# Patient Record
Sex: MALE | Race: WHITE | NOT HISPANIC OR LATINO | URBAN - METROPOLITAN AREA
[De-identification: names, ages, dates, MRNs, and addresses within clinical notes are randomized per-mention and may not be internally consistent; named-entity substitution may affect disease eponyms.]

---

## 2021-01-13 ENCOUNTER — EMERGENCY (EMERGENCY)
Facility: HOSPITAL | Age: 76
LOS: 1 days | Discharge: HOME | End: 2021-01-13
Admitting: EMERGENCY MEDICINE
Payer: MEDICARE

## 2021-01-13 VITALS
TEMPERATURE: 98 F | RESPIRATION RATE: 18 BRPM | OXYGEN SATURATION: 95 % | SYSTOLIC BLOOD PRESSURE: 143 MMHG | DIASTOLIC BLOOD PRESSURE: 74 MMHG | HEART RATE: 71 BPM

## 2021-01-13 DIAGNOSIS — Z20.822 CONTACT WITH AND (SUSPECTED) EXPOSURE TO COVID-19: ICD-10-CM

## 2021-01-13 LAB — SARS-COV-2 RNA SPEC QL NAA+PROBE: SIGNIFICANT CHANGE UP

## 2021-01-13 PROCEDURE — U0003: CPT

## 2021-01-13 PROCEDURE — 99283 EMERGENCY DEPT VISIT LOW MDM: CPT

## 2021-01-13 PROCEDURE — U0005: CPT

## 2021-01-13 NOTE — ED PROVIDER NOTE - PATIENT PORTAL LINK FT
You can access the FollowMyHealth Patient Portal offered by [facility] by registering at the following website: [email or web address]. By joining [employer]’s FollowMyHealth portal, you will also be able to view your health information using other applications (apps) compatible with our system.

## 2021-01-21 ENCOUNTER — EMERGENCY (EMERGENCY)
Facility: HOSPITAL | Age: 76
LOS: 1 days | Discharge: HOME | End: 2021-01-21
Admitting: EMERGENCY MEDICINE
Payer: MEDICARE

## 2021-01-21 VITALS
RESPIRATION RATE: 18 BRPM | SYSTOLIC BLOOD PRESSURE: 153 MMHG | HEART RATE: 65 BPM | OXYGEN SATURATION: 98 % | DIASTOLIC BLOOD PRESSURE: 109 MMHG | TEMPERATURE: 97 F

## 2021-01-21 DIAGNOSIS — Z20.822 CONTACT WITH AND (SUSPECTED) EXPOSURE TO COVID-19: ICD-10-CM

## 2021-01-21 LAB — SARS-COV-2 RNA SPEC QL NAA+PROBE: SIGNIFICANT CHANGE UP

## 2021-01-21 PROCEDURE — U0003: CPT

## 2021-01-21 PROCEDURE — U0005: CPT

## 2021-01-21 PROCEDURE — 99283 EMERGENCY DEPT VISIT LOW MDM: CPT

## 2021-01-21 NOTE — ED PROVIDER NOTE - CLINICAL SUMMARY MEDICAL DECISION MAKING FREE TEXT BOX
Patient is presenting to the Emergency Department and requesting a COVID-19 test.  Patient has minimal symptoms, has an unremarkable focused exam and looks well.  Nasopharyngeal PCR has been obtained and patient has been guided on how to obtain their results.  General COVID-19 discharge instructions have been given to the patient. Patient is presenting to the Emergency Department and requesting a COVID-19 test.  Patient has minimal symptoms, has an unremarkable focused exam and looks well.  Nasopharyngeal PCR has been obtained and patient has been guided on how to obtain their results.  General COVID-19 discharge instructions have been given to the patient.    Pts BP noted to be elevated. Pt asymptomatic. Pt made aware and advised to f/u with pmd

## 2021-01-21 NOTE — ED PROVIDER NOTE - NS ED ROS FT
CONSTITUTIONAL: +subj fever, no chills, no bodyaches  HEENT:  No sore throat or headache, no nasal congestion  PULM:  No cough or shortness of breath  GI:  No diarrhea, no vomiting

## 2021-01-21 NOTE — ED PROVIDER NOTE - OBJECTIVE STATEMENT
Patient is presenting to the Emergency Department with a request to have COVID-19 testing.  Pt reports malaise and subjective fever. Denies symptoms, including cough, shortness of breath, chills, bodyaches or sore throat.

## 2021-05-11 NOTE — ED PROVIDER NOTE - NS ED MD DISPO DISCHARGE CCDA
Clear bilaterally, pupils equal, round and reactive to light.
Patient/Caregiver provided printed discharge information.

## 2022-01-21 ENCOUNTER — APPOINTMENT (OUTPATIENT)
Dept: OTOLARYNGOLOGY | Facility: CLINIC | Age: 77
End: 2022-01-21
Payer: MEDICARE

## 2022-01-21 VITALS
HEART RATE: 74 BPM | HEIGHT: 69 IN | SYSTOLIC BLOOD PRESSURE: 143 MMHG | WEIGHT: 180 LBS | DIASTOLIC BLOOD PRESSURE: 89 MMHG | TEMPERATURE: 97.4 F | BODY MASS INDEX: 26.66 KG/M2

## 2022-01-21 PROCEDURE — 99204 OFFICE O/P NEW MOD 45 MIN: CPT | Mod: 25

## 2022-01-21 PROCEDURE — 31575 DIAGNOSTIC LARYNGOSCOPY: CPT

## 2022-01-21 RX ORDER — ASPIRIN 81 MG
TABLET,CHEWABLE ORAL
Refills: 0 | Status: ACTIVE | COMMUNITY

## 2022-01-21 NOTE — HISTORY OF PRESENT ILLNESS
[de-identified] : 76y M presents for hoarseness that has been progressing over years. Pt has worsening symptoms with tension. Nexium improves the voice. Pt has not been able to sing and it takes great effort when he tries. Pt has some trouble when he speaks a lot.  [Difficulty Swallowing] : no difficulty swallowing [Cough] : no cough [Hoarseness] : hoarseness [Shortness of Breath] : no shortness of breath

## 2022-01-21 NOTE — PROCEDURE
[Flexible Endoscope] : examined with the flexible endoscope [True Vocal Cords Erythematous] : bilateral true vocal cord edema [Glottis Arytenoid Cartilages Erythema] : bilateral arytenoid ~M erythema [Arytenoid Erythema ___ /4] : arytenoid erythema [unfilled]U/4 [Normal] : posterior cricoid area had healthy pink mucosa in the interarytenoid area and the esophageal inlet [Interarytenoid Edema] : interarytenoid area edematous

## 2022-02-03 ENCOUNTER — APPOINTMENT (OUTPATIENT)
Dept: OTOLARYNGOLOGY | Facility: CLINIC | Age: 77
End: 2022-02-03

## 2022-02-08 ENCOUNTER — APPOINTMENT (OUTPATIENT)
Dept: OTOLARYNGOLOGY | Facility: CLINIC | Age: 77
End: 2022-02-08
Payer: MEDICARE

## 2022-02-08 ENCOUNTER — APPOINTMENT (OUTPATIENT)
Dept: PHARMACY | Facility: CLINIC | Age: 77
End: 2022-02-08
Payer: SELF-PAY

## 2022-02-08 PROCEDURE — 92557 COMPREHENSIVE HEARING TEST: CPT

## 2022-02-08 PROCEDURE — 92550 TYMPANOMETRY & REFLEX THRESH: CPT

## 2022-02-08 PROCEDURE — V5090: CPT

## 2022-02-08 PROCEDURE — V5010 ASSESSMENT FOR HEARING AID: CPT

## 2022-02-17 ENCOUNTER — APPOINTMENT (OUTPATIENT)
Dept: OTOLARYNGOLOGY | Facility: CLINIC | Age: 77
End: 2022-02-17
Payer: MEDICARE

## 2022-02-17 ENCOUNTER — NON-APPOINTMENT (OUTPATIENT)
Age: 77
End: 2022-02-17

## 2022-02-17 PROCEDURE — 92524 BEHAVRAL QUALIT ANALYS VOICE: CPT | Mod: GN

## 2022-02-17 PROCEDURE — 31579 LARYNGOSCOPY TELESCOPIC: CPT

## 2022-03-03 ENCOUNTER — APPOINTMENT (OUTPATIENT)
Dept: OTOLARYNGOLOGY | Facility: CLINIC | Age: 77
End: 2022-03-03

## 2022-03-08 ENCOUNTER — APPOINTMENT (OUTPATIENT)
Dept: PHARMACY | Facility: CLINIC | Age: 77
End: 2022-03-08
Payer: SELF-PAY

## 2022-03-08 PROCEDURE — V5299A: CUSTOM | Mod: NC

## 2022-03-18 ENCOUNTER — APPOINTMENT (OUTPATIENT)
Dept: OTOLARYNGOLOGY | Facility: CLINIC | Age: 77
End: 2022-03-18
Payer: MEDICARE

## 2022-03-18 VITALS
HEART RATE: 69 BPM | WEIGHT: 180 LBS | TEMPERATURE: 97.2 F | SYSTOLIC BLOOD PRESSURE: 129 MMHG | BODY MASS INDEX: 26.66 KG/M2 | HEIGHT: 69 IN | DIASTOLIC BLOOD PRESSURE: 87 MMHG

## 2022-03-18 DIAGNOSIS — R49.0 DYSPHONIA: ICD-10-CM

## 2022-03-18 DIAGNOSIS — K21.9 GASTRO-ESOPHAGEAL REFLUX DISEASE W/OUT ESOPHAGITIS: ICD-10-CM

## 2022-03-18 DIAGNOSIS — H90.5 UNSPECIFIED SENSORINEURAL HEARING LOSS: ICD-10-CM

## 2022-03-18 PROCEDURE — 31575 DIAGNOSTIC LARYNGOSCOPY: CPT

## 2022-03-18 PROCEDURE — 99214 OFFICE O/P EST MOD 30 MIN: CPT | Mod: 25

## 2022-03-18 RX ORDER — PANTOPRAZOLE 40 MG/1
40 TABLET, DELAYED RELEASE ORAL
Qty: 30 | Refills: 3 | Status: ACTIVE | COMMUNITY
Start: 2022-03-18 | End: 1900-01-01

## 2022-03-18 NOTE — HISTORY OF PRESENT ILLNESS
[FreeTextEntry1] : Pt has some improvement with medication and therapy. Pt still has decreased voice quality and would like better. Pt went for  and showed SNHL and is getting hearing aids.

## 2022-04-01 ENCOUNTER — APPOINTMENT (OUTPATIENT)
Dept: OTOLARYNGOLOGY | Facility: CLINIC | Age: 77
End: 2022-04-01
Payer: MEDICARE

## 2022-04-01 PROCEDURE — 92524 BEHAVRAL QUALIT ANALYS VOICE: CPT | Mod: GN

## 2022-04-01 PROCEDURE — 31579 LARYNGOSCOPY TELESCOPIC: CPT

## 2022-04-08 ENCOUNTER — APPOINTMENT (OUTPATIENT)
Dept: OTOLARYNGOLOGY | Facility: CLINIC | Age: 77
End: 2022-04-08
Payer: MEDICARE

## 2022-04-08 PROCEDURE — 92507 TX SP LANG VOICE COMM INDIV: CPT | Mod: GN

## 2022-04-25 ENCOUNTER — APPOINTMENT (OUTPATIENT)
Dept: OTOLARYNGOLOGY | Facility: CLINIC | Age: 77
End: 2022-04-25

## 2022-05-25 ENCOUNTER — RX RENEWAL (OUTPATIENT)
Age: 77
End: 2022-05-25

## 2022-07-08 RX ORDER — NIRMATRELVIR AND RITONAVIR 150-100 MG
3 KIT ORAL
Qty: 0 | Refills: 0 | DISCHARGE
Start: 2022-07-08 | End: 2022-07-11

## 2022-07-12 ENCOUNTER — INPATIENT (INPATIENT)
Facility: HOSPITAL | Age: 77
LOS: 10 days | Discharge: NURSING, REHAB OR LONG-TERM CARE | DRG: 23 | End: 2022-07-23
Attending: PSYCHIATRY & NEUROLOGY | Admitting: RADIOLOGY
Payer: MEDICARE

## 2022-07-12 ENCOUNTER — TRANSCRIPTION ENCOUNTER (OUTPATIENT)
Age: 77
End: 2022-07-12

## 2022-07-12 VITALS
HEART RATE: 80 BPM | HEIGHT: 68 IN | SYSTOLIC BLOOD PRESSURE: 179 MMHG | OXYGEN SATURATION: 97 % | RESPIRATION RATE: 16 BRPM | DIASTOLIC BLOOD PRESSURE: 100 MMHG | WEIGHT: 170.2 LBS

## 2022-07-12 DIAGNOSIS — Z95.5 PRESENCE OF CORONARY ANGIOPLASTY IMPLANT AND GRAFT: Chronic | ICD-10-CM

## 2022-07-12 DIAGNOSIS — E78.5 HYPERLIPIDEMIA, UNSPECIFIED: ICD-10-CM

## 2022-07-12 DIAGNOSIS — I63.9 CEREBRAL INFARCTION, UNSPECIFIED: ICD-10-CM

## 2022-07-12 DIAGNOSIS — I10 ESSENTIAL (PRIMARY) HYPERTENSION: ICD-10-CM

## 2022-07-12 DIAGNOSIS — Z98.890 OTHER SPECIFIED POSTPROCEDURAL STATES: Chronic | ICD-10-CM

## 2022-07-12 DIAGNOSIS — I48.91 UNSPECIFIED ATRIAL FIBRILLATION: ICD-10-CM

## 2022-07-12 DIAGNOSIS — Z95.9 PRESENCE OF CARDIAC AND VASCULAR IMPLANT AND GRAFT, UNSPECIFIED: Chronic | ICD-10-CM

## 2022-07-12 LAB
ALBUMIN SERPL ELPH-MCNC: 3.5 G/DL — SIGNIFICANT CHANGE UP (ref 3.3–5)
ALBUMIN SERPL ELPH-MCNC: 4.1 G/DL — SIGNIFICANT CHANGE UP (ref 3.3–5)
ALP SERPL-CCNC: 67 U/L — SIGNIFICANT CHANGE UP (ref 40–120)
ALP SERPL-CCNC: 89 U/L — SIGNIFICANT CHANGE UP (ref 40–120)
ALT FLD-CCNC: 22 U/L — SIGNIFICANT CHANGE UP (ref 10–45)
ALT FLD-CCNC: 31 U/L — SIGNIFICANT CHANGE UP (ref 10–45)
ANION GAP SERPL CALC-SCNC: 10 MMOL/L — SIGNIFICANT CHANGE UP (ref 5–17)
ANION GAP SERPL CALC-SCNC: 11 MMOL/L — SIGNIFICANT CHANGE UP (ref 5–17)
APTT BLD: 26.1 SEC — LOW (ref 27.5–35.5)
APTT BLD: 28.5 SEC — SIGNIFICANT CHANGE UP (ref 27.5–35.5)
AST SERPL-CCNC: 28 U/L — SIGNIFICANT CHANGE UP (ref 10–40)
AST SERPL-CCNC: 40 U/L — SIGNIFICANT CHANGE UP (ref 10–40)
BASE EXCESS BLDCOV CALC-SCNC: -3.3 MMOL/L — SIGNIFICANT CHANGE UP (ref -9.3–0.3)
BASOPHILS # BLD AUTO: 0.03 K/UL — SIGNIFICANT CHANGE UP (ref 0–0.2)
BASOPHILS NFR BLD AUTO: 0.5 % — SIGNIFICANT CHANGE UP (ref 0–2)
BILIRUB DIRECT SERPL-MCNC: 0.2 MG/DL — SIGNIFICANT CHANGE UP (ref 0–0.3)
BILIRUB INDIRECT FLD-MCNC: 0.2 MG/DL — SIGNIFICANT CHANGE UP (ref 0.2–1)
BILIRUB SERPL-MCNC: 0.3 MG/DL — SIGNIFICANT CHANGE UP (ref 0.2–1.2)
BILIRUB SERPL-MCNC: 0.4 MG/DL — SIGNIFICANT CHANGE UP (ref 0.2–1.2)
BLD GP AB SCN SERPL QL: NEGATIVE — SIGNIFICANT CHANGE UP
BUN SERPL-MCNC: 30 MG/DL — HIGH (ref 7–23)
BUN SERPL-MCNC: 37 MG/DL — HIGH (ref 7–23)
CALCIUM SERPL-MCNC: 8.4 MG/DL — SIGNIFICANT CHANGE UP (ref 8.4–10.5)
CALCIUM SERPL-MCNC: 9.7 MG/DL — SIGNIFICANT CHANGE UP (ref 8.4–10.5)
CHLORIDE SERPL-SCNC: 104 MMOL/L — SIGNIFICANT CHANGE UP (ref 96–108)
CHLORIDE SERPL-SCNC: 111 MMOL/L — HIGH (ref 96–108)
CO2 BLDCOV-SCNC: 23 MMOL/L — SIGNIFICANT CHANGE UP
CO2 SERPL-SCNC: 20 MMOL/L — LOW (ref 22–31)
CO2 SERPL-SCNC: 27 MMOL/L — SIGNIFICANT CHANGE UP (ref 22–31)
CREAT SERPL-MCNC: 1.06 MG/DL — SIGNIFICANT CHANGE UP (ref 0.5–1.3)
CREAT SERPL-MCNC: 1.36 MG/DL — HIGH (ref 0.5–1.3)
EGFR: 54 ML/MIN/1.73M2 — LOW
EGFR: 72 ML/MIN/1.73M2 — SIGNIFICANT CHANGE UP
EOSINOPHIL # BLD AUTO: 0.25 K/UL — SIGNIFICANT CHANGE UP (ref 0–0.5)
EOSINOPHIL NFR BLD AUTO: 3.8 % — SIGNIFICANT CHANGE UP (ref 0–6)
GAS PNL BLDCOV: 7.35 — SIGNIFICANT CHANGE UP (ref 7.25–7.45)
GLUCOSE BLDC GLUCOMTR-MCNC: 116 MG/DL — HIGH (ref 70–99)
GLUCOSE BLDC GLUCOMTR-MCNC: 120 MG/DL — HIGH (ref 70–99)
GLUCOSE BLDC GLUCOMTR-MCNC: 129 MG/DL — HIGH (ref 70–99)
GLUCOSE SERPL-MCNC: 105 MG/DL — HIGH (ref 70–99)
GLUCOSE SERPL-MCNC: 127 MG/DL — HIGH (ref 70–99)
HCO3 BLDCOV-SCNC: 22 MMOL/L — SIGNIFICANT CHANGE UP
HCT VFR BLD CALC: 37.9 % — LOW (ref 39–50)
HCT VFR BLD CALC: 46.6 % — SIGNIFICANT CHANGE UP (ref 39–50)
HGB BLD-MCNC: 13 G/DL — SIGNIFICANT CHANGE UP (ref 13–17)
HGB BLD-MCNC: 15.9 G/DL — SIGNIFICANT CHANGE UP (ref 13–17)
IMM GRANULOCYTES NFR BLD AUTO: 0.5 % — SIGNIFICANT CHANGE UP (ref 0–1.5)
INR BLD: 0.92 — SIGNIFICANT CHANGE UP (ref 0.88–1.16)
INR BLD: 1.06 — SIGNIFICANT CHANGE UP (ref 0.88–1.16)
LACTATE SERPL-SCNC: 1.4 MMOL/L — SIGNIFICANT CHANGE UP (ref 0.5–2)
LYMPHOCYTES # BLD AUTO: 2.16 K/UL — SIGNIFICANT CHANGE UP (ref 1–3.3)
LYMPHOCYTES # BLD AUTO: 33.1 % — SIGNIFICANT CHANGE UP (ref 13–44)
MAGNESIUM SERPL-MCNC: 1.7 MG/DL — SIGNIFICANT CHANGE UP (ref 1.6–2.6)
MCHC RBC-ENTMCNC: 31.5 PG — SIGNIFICANT CHANGE UP (ref 27–34)
MCHC RBC-ENTMCNC: 31.7 PG — SIGNIFICANT CHANGE UP (ref 27–34)
MCHC RBC-ENTMCNC: 34.1 GM/DL — SIGNIFICANT CHANGE UP (ref 32–36)
MCHC RBC-ENTMCNC: 34.3 GM/DL — SIGNIFICANT CHANGE UP (ref 32–36)
MCV RBC AUTO: 92.3 FL — SIGNIFICANT CHANGE UP (ref 80–100)
MCV RBC AUTO: 92.4 FL — SIGNIFICANT CHANGE UP (ref 80–100)
MONOCYTES # BLD AUTO: 0.82 K/UL — SIGNIFICANT CHANGE UP (ref 0–0.9)
MONOCYTES NFR BLD AUTO: 12.6 % — SIGNIFICANT CHANGE UP (ref 2–14)
NEUTROPHILS # BLD AUTO: 3.23 K/UL — SIGNIFICANT CHANGE UP (ref 1.8–7.4)
NEUTROPHILS NFR BLD AUTO: 49.5 % — SIGNIFICANT CHANGE UP (ref 43–77)
NRBC # BLD: 0 /100 WBCS — SIGNIFICANT CHANGE UP (ref 0–0)
NRBC # BLD: 0 /100 WBCS — SIGNIFICANT CHANGE UP (ref 0–0)
PCO2 BLDCOV: 40 MMHG — SIGNIFICANT CHANGE UP (ref 27–49)
PHOSPHATE SERPL-MCNC: 3.2 MG/DL — SIGNIFICANT CHANGE UP (ref 2.5–4.5)
PLATELET # BLD AUTO: 250 K/UL — SIGNIFICANT CHANGE UP (ref 150–400)
PLATELET # BLD AUTO: 326 K/UL — SIGNIFICANT CHANGE UP (ref 150–400)
PO2 BLDCOA: 64 MMHG — HIGH (ref 17–41)
POTASSIUM SERPL-MCNC: 3.9 MMOL/L — SIGNIFICANT CHANGE UP (ref 3.5–5.3)
POTASSIUM SERPL-MCNC: 4.6 MMOL/L — SIGNIFICANT CHANGE UP (ref 3.5–5.3)
POTASSIUM SERPL-SCNC: 3.9 MMOL/L — SIGNIFICANT CHANGE UP (ref 3.5–5.3)
POTASSIUM SERPL-SCNC: 4.6 MMOL/L — SIGNIFICANT CHANGE UP (ref 3.5–5.3)
PROT SERPL-MCNC: 5.6 G/DL — LOW (ref 6–8.3)
PROT SERPL-MCNC: 6.9 G/DL — SIGNIFICANT CHANGE UP (ref 6–8.3)
PROTHROM AB SERPL-ACNC: 10.9 SEC — SIGNIFICANT CHANGE UP (ref 10.5–13.4)
PROTHROM AB SERPL-ACNC: 12.6 SEC — SIGNIFICANT CHANGE UP (ref 10.5–13.4)
RBC # BLD: 4.1 M/UL — LOW (ref 4.2–5.8)
RBC # BLD: 5.05 M/UL — SIGNIFICANT CHANGE UP (ref 4.2–5.8)
RBC # FLD: 12.7 % — SIGNIFICANT CHANGE UP (ref 10.3–14.5)
RBC # FLD: 12.8 % — SIGNIFICANT CHANGE UP (ref 10.3–14.5)
RH IG SCN BLD-IMP: POSITIVE — SIGNIFICANT CHANGE UP
SAO2 % BLDCOV: 91.4 % — SIGNIFICANT CHANGE UP
SARS-COV-2 RNA SPEC QL NAA+PROBE: POSITIVE
SODIUM SERPL-SCNC: 141 MMOL/L — SIGNIFICANT CHANGE UP (ref 135–145)
SODIUM SERPL-SCNC: 142 MMOL/L — SIGNIFICANT CHANGE UP (ref 135–145)
TROPONIN T SERPL-MCNC: 0.01 NG/ML — SIGNIFICANT CHANGE UP (ref 0–0.01)
TROPONIN T SERPL-MCNC: <0.01 NG/ML — SIGNIFICANT CHANGE UP (ref 0–0.01)
WBC # BLD: 6.52 K/UL — SIGNIFICANT CHANGE UP (ref 3.8–10.5)
WBC # BLD: 8.45 K/UL — SIGNIFICANT CHANGE UP (ref 3.8–10.5)
WBC # FLD AUTO: 6.52 K/UL — SIGNIFICANT CHANGE UP (ref 3.8–10.5)
WBC # FLD AUTO: 8.45 K/UL — SIGNIFICANT CHANGE UP (ref 3.8–10.5)

## 2022-07-12 PROCEDURE — 70460 CT HEAD/BRAIN W/DYE: CPT | Mod: 26

## 2022-07-12 PROCEDURE — 70498 CT ANGIOGRAPHY NECK: CPT | Mod: 26,MA

## 2022-07-12 PROCEDURE — 99223 1ST HOSP IP/OBS HIGH 75: CPT

## 2022-07-12 PROCEDURE — 93010 ELECTROCARDIOGRAM REPORT: CPT

## 2022-07-12 PROCEDURE — 70496 CT ANGIOGRAPHY HEAD: CPT | Mod: 26,MA

## 2022-07-12 PROCEDURE — 99291 CRITICAL CARE FIRST HOUR: CPT

## 2022-07-12 PROCEDURE — 70450 CT HEAD/BRAIN W/O DYE: CPT | Mod: 26,77

## 2022-07-12 PROCEDURE — 99024 POSTOP FOLLOW-UP VISIT: CPT

## 2022-07-12 PROCEDURE — 61645 PERQ ART M-THROMBECT &/NFS: CPT | Mod: LT

## 2022-07-12 PROCEDURE — 0042T: CPT | Mod: MA

## 2022-07-12 PROCEDURE — 99291 CRITICAL CARE FIRST HOUR: CPT | Mod: CS

## 2022-07-12 RX ORDER — POTASSIUM CHLORIDE 20 MEQ
10 PACKET (EA) ORAL ONCE
Refills: 0 | Status: COMPLETED | OUTPATIENT
Start: 2022-07-12 | End: 2022-07-12

## 2022-07-12 RX ORDER — DEXMEDETOMIDINE HYDROCHLORIDE IN 0.9% SODIUM CHLORIDE 4 UG/ML
0.2 INJECTION INTRAVENOUS
Qty: 400 | Refills: 0 | Status: DISCONTINUED | OUTPATIENT
Start: 2022-07-12 | End: 2022-07-12

## 2022-07-12 RX ORDER — DEXTROSE 50 % IN WATER 50 %
25 SYRINGE (ML) INTRAVENOUS ONCE
Refills: 0 | Status: DISCONTINUED | OUTPATIENT
Start: 2022-07-12 | End: 2022-07-12

## 2022-07-12 RX ORDER — METOPROLOL TARTRATE 50 MG
2.5 TABLET ORAL EVERY 6 HOURS
Refills: 0 | Status: DISCONTINUED | OUTPATIENT
Start: 2022-07-12 | End: 2022-07-12

## 2022-07-12 RX ORDER — NOREPINEPHRINE BITARTRATE/D5W 8 MG/250ML
0.05 PLASTIC BAG, INJECTION (ML) INTRAVENOUS
Qty: 8 | Refills: 0 | Status: DISCONTINUED | OUTPATIENT
Start: 2022-07-12 | End: 2022-07-12

## 2022-07-12 RX ORDER — GLUCAGON INJECTION, SOLUTION 0.5 MG/.1ML
1 INJECTION, SOLUTION SUBCUTANEOUS ONCE
Refills: 0 | Status: DISCONTINUED | OUTPATIENT
Start: 2022-07-12 | End: 2022-07-12

## 2022-07-12 RX ORDER — DEXTROSE 50 % IN WATER 50 %
15 SYRINGE (ML) INTRAVENOUS ONCE
Refills: 0 | Status: DISCONTINUED | OUTPATIENT
Start: 2022-07-12 | End: 2022-07-12

## 2022-07-12 RX ORDER — MAGNESIUM SULFATE 500 MG/ML
2 VIAL (ML) INJECTION ONCE
Refills: 0 | Status: COMPLETED | OUTPATIENT
Start: 2022-07-12 | End: 2022-07-12

## 2022-07-12 RX ORDER — ACETAMINOPHEN 500 MG
650 TABLET ORAL EVERY 6 HOURS
Refills: 0 | Status: DISCONTINUED | OUTPATIENT
Start: 2022-07-12 | End: 2022-07-23

## 2022-07-12 RX ORDER — PANTOPRAZOLE SODIUM 20 MG/1
40 TABLET, DELAYED RELEASE ORAL DAILY
Refills: 0 | Status: DISCONTINUED | OUTPATIENT
Start: 2022-07-12 | End: 2022-07-15

## 2022-07-12 RX ORDER — HYDRALAZINE HCL 50 MG
10 TABLET ORAL EVERY 4 HOURS
Refills: 0 | Status: DISCONTINUED | OUTPATIENT
Start: 2022-07-12 | End: 2022-07-12

## 2022-07-12 RX ORDER — CHLORHEXIDINE GLUCONATE 213 G/1000ML
1 SOLUTION TOPICAL
Refills: 0 | Status: DISCONTINUED | OUTPATIENT
Start: 2022-07-12 | End: 2022-07-23

## 2022-07-12 RX ORDER — ACETAMINOPHEN 500 MG
1000 TABLET ORAL ONCE
Refills: 0 | Status: COMPLETED | OUTPATIENT
Start: 2022-07-12 | End: 2022-07-12

## 2022-07-12 RX ORDER — SODIUM CHLORIDE 9 MG/ML
1000 INJECTION INTRAMUSCULAR; INTRAVENOUS; SUBCUTANEOUS
Refills: 0 | Status: DISCONTINUED | OUTPATIENT
Start: 2022-07-12 | End: 2022-07-13

## 2022-07-12 RX ORDER — SODIUM CHLORIDE 9 MG/ML
1000 INJECTION INTRAMUSCULAR; INTRAVENOUS; SUBCUTANEOUS ONCE
Refills: 0 | Status: COMPLETED | OUTPATIENT
Start: 2022-07-12 | End: 2022-07-12

## 2022-07-12 RX ORDER — DEXTROSE 50 % IN WATER 50 %
12.5 SYRINGE (ML) INTRAVENOUS ONCE
Refills: 0 | Status: DISCONTINUED | OUTPATIENT
Start: 2022-07-12 | End: 2022-07-12

## 2022-07-12 RX ORDER — SODIUM CHLORIDE 9 MG/ML
1000 INJECTION, SOLUTION INTRAVENOUS
Refills: 0 | Status: DISCONTINUED | OUTPATIENT
Start: 2022-07-12 | End: 2022-07-12

## 2022-07-12 RX ORDER — HYDRALAZINE HCL 50 MG
10 TABLET ORAL EVERY 4 HOURS
Refills: 0 | Status: DISCONTINUED | OUTPATIENT
Start: 2022-07-12 | End: 2022-07-13

## 2022-07-12 RX ORDER — ALBUMIN HUMAN 25 %
250 VIAL (ML) INTRAVENOUS ONCE
Refills: 0 | Status: COMPLETED | OUTPATIENT
Start: 2022-07-12 | End: 2022-07-12

## 2022-07-12 RX ORDER — INSULIN LISPRO 100/ML
VIAL (ML) SUBCUTANEOUS
Refills: 0 | Status: DISCONTINUED | OUTPATIENT
Start: 2022-07-12 | End: 2022-07-15

## 2022-07-12 RX ORDER — DEXMEDETOMIDINE HYDROCHLORIDE IN 0.9% SODIUM CHLORIDE 4 UG/ML
0.2 INJECTION INTRAVENOUS
Qty: 400 | Refills: 0 | Status: DISCONTINUED | OUTPATIENT
Start: 2022-07-12 | End: 2022-07-13

## 2022-07-12 RX ORDER — ATORVASTATIN CALCIUM 80 MG/1
80 TABLET, FILM COATED ORAL AT BEDTIME
Refills: 0 | Status: DISCONTINUED | OUTPATIENT
Start: 2022-07-12 | End: 2022-07-23

## 2022-07-12 RX ORDER — ONDANSETRON 8 MG/1
4 TABLET, FILM COATED ORAL EVERY 6 HOURS
Refills: 0 | Status: DISCONTINUED | OUTPATIENT
Start: 2022-07-12 | End: 2022-07-23

## 2022-07-12 RX ADMIN — Medication 100 MILLIEQUIVALENT(S): at 21:56

## 2022-07-12 RX ADMIN — Medication 7.24 MICROGRAM(S)/KG/MIN: at 21:26

## 2022-07-12 RX ADMIN — SODIUM CHLORIDE 1000 MILLILITER(S): 9 INJECTION INTRAMUSCULAR; INTRAVENOUS; SUBCUTANEOUS at 21:14

## 2022-07-12 RX ADMIN — SODIUM CHLORIDE 1000 MILLILITER(S): 9 INJECTION INTRAMUSCULAR; INTRAVENOUS; SUBCUTANEOUS at 19:29

## 2022-07-12 RX ADMIN — Medication 25 GRAM(S): at 21:50

## 2022-07-12 RX ADMIN — Medication 2.5 MILLIGRAM(S): at 18:30

## 2022-07-12 RX ADMIN — Medication 400 MILLIGRAM(S): at 18:30

## 2022-07-12 RX ADMIN — Medication 125 MILLILITER(S): at 23:30

## 2022-07-12 RX ADMIN — DEXMEDETOMIDINE HYDROCHLORIDE IN 0.9% SODIUM CHLORIDE 3.86 MICROGRAM(S)/KG/HR: 4 INJECTION INTRAVENOUS at 18:00

## 2022-07-12 RX ADMIN — Medication 1000 MILLIGRAM(S): at 18:45

## 2022-07-12 RX ADMIN — Medication 10 MILLIGRAM(S): at 17:35

## 2022-07-12 RX ADMIN — PANTOPRAZOLE SODIUM 40 MILLIGRAM(S): 20 TABLET, DELAYED RELEASE ORAL at 18:30

## 2022-07-12 NOTE — PROVIDER CONTACT NOTE (OTHER) - ACTION/TREATMENT ORDERED:
1 L NS bolus given , precedex stopped.
10 mg hydralazine given
levophed started
1 L bolus of NS given through pressure bag

## 2022-07-12 NOTE — ED PROVIDER NOTE - NEUROLOGICAL, MLM
Aphasic, not moving R side, + R facial droop, moving L side, + L sided gaze preference.  See NIHSS per stroke code note.

## 2022-07-12 NOTE — PACU DISCHARGE NOTE - COMMENTS
received pt from cath lab- pt is s.p mechanical thrombectomy via right groin with CDI per close dressing- palpable 2+ DP/PD- pt is alert and opens eyes spontaneously-pt is aphasic. pt able to follow some basic commands but unable to follow complex commands- minor facial asymmetry - No drift notes- mild RUE weakness and B/L LE 5/5 strength- spouse at bedside - constant re-orientation provided- ICU attending and Pa at bedside- endorsed to NAT Marti -pt left unit via bed on IVF to 804

## 2022-07-12 NOTE — PROGRESS NOTE ADULT - SUBJECTIVE AND OBJECTIVE BOX
NEUROSURGERY POST OP NOTE:      77 y/o male nonsmoker PMHx Afib (on Xarelto), CVD, HTN, HLD, GERD and COVID on 7/8/22 (tx with pPaxlovid) POD# 0 S/P mechanical cerebral thrombectomy of L MCA via R groin.     S: Pt has been agitated. Pt accompanied by wife, she states he lowered the dose of Xarelto since taking Paxlovid.       T(C): 35.8 (07-12-22 @ 13:25), Max: 36.8 (07-12-22 @ 11:28)  HR: 80 (07-12-22 @ 15:55) (65 - 88)  BP: 151/90 (07-12-22 @ 15:25) (129/60 - 179/100)  RR: 18 (07-12-22 @ 15:55) (15 - 24)  SpO2: 99% (07-12-22 @ 15:55) (95% - 100%)      07-12-22 @ 07:01  -  07-12-22 @ 17:14  --------------------------------------------------------  IN: 375 mL / OUT: 0 mL / NET: 375 mL        acetaminophen     Tablet .. 650 milliGRAM(s) Oral every 6 hours PRN  atorvastatin 80 milliGRAM(s) Oral at bedtime  hydrALAZINE Injectable 10 milliGRAM(s) IV Push every 4 hours PRN  ondansetron Injectable 4 milliGRAM(s) IV Push every 6 hours PRN  sodium chloride 0.9%. 1000 milliLiter(s) IV Continuous <Continuous>      RADIOLOGY:   CT Brain Stroke Protocol: Hyperdense left MCA which is suspicious for thrombus and   acute left MCA territory infarct.  CT Brain Perfusion Maps Stroke: Abnormal perfusion with RAPID calculated mismatch volume of   136 ml and mismatch ratio is 4.4 within the left hemisphere.  CT Angio Brain Stroke Protocol w/ IV contrast: Focal area of mild to moderate narrowing involving the origin   of the right vertebral artery. No carotid stenosis.  CT Angio Neck Stroke Protocol w/ IV contrast: Focal area of mild to moderate narrowing involving the origin   of the right vertebral artery. No carotid stenosis.    Exam:  General: Pt appears anxious and agitated.  HEENT: PERRL. EOMI.   Pulmonary: No increased work of breathing or use of accessory muscles on RA.   Neuro: AAO x2. Opens and closes eyes to commands. Mixed receptive and expressive aphasia. Moving all extremities. Strength 4/5 on LLE, LUE, RUE. Negative pronator drift.   Exts: No edema of LE. Dorsalis pedis pulses 2+ RLE and 2 LLE   Wounds: Right groin access site with no surrounding hematoma       Assessment: 76y Male with PMHx of afib (on xarelto), HTN, HLD, recent COVID infection (diagnosed 7/8/22, on Paxlovid) with L MCA stroke POD 1 s/p mechanical cerebral thrombectomy of L MCA via R groin       PLAN:    NEURO  -Vital checks q1 /Neuro checks q1 / Neurovascular checks q1       CARDIO    PULM    GI    RENAL    ENDO  - ISS, A1C:     ID  - COVID+, isolation precautions    HEME  - SCDs for DVT ppx    DISPO: ICU status, family updated, dispo pend    Assesment and Plan d/w Dr. Lunsford and Dr. Haegn          NEUROSURGERY POST OP NOTE:      75 y/o male nonsmoker PMHx Afib (on Xarelto), CVD, HTN, HLD, GERD and COVID on 7/8/22 (tx with Paxlovid) POD# 0 S/P mechanical cerebral thrombectomy of L MCA via R groin.     S: Pt has been agitated. Pt accompanied by wife, she states he lowered the dose of Xarelto since taking Paxlovid.       T(C): 35.8 (07-12-22 @ 13:25), Max: 36.8 (07-12-22 @ 11:28)  HR: 80 (07-12-22 @ 15:55) (65 - 88)  BP: 151/90 (07-12-22 @ 15:25) (129/60 - 179/100)  RR: 18 (07-12-22 @ 15:55) (15 - 24)  SpO2: 99% (07-12-22 @ 15:55) (95% - 100%)      07-12-22 @ 07:01  -  07-12-22 @ 17:14  --------------------------------------------------------  IN: 375 mL / OUT: 0 mL / NET: 375 mL        acetaminophen     Tablet .. 650 milliGRAM(s) Oral every 6 hours PRN  atorvastatin 80 milliGRAM(s) Oral at bedtime  hydrALAZINE Injectable 10 milliGRAM(s) IV Push every 4 hours PRN  ondansetron Injectable 4 milliGRAM(s) IV Push every 6 hours PRN  sodium chloride 0.9%. 1000 milliLiter(s) IV Continuous <Continuous>      RADIOLOGY:   CT Brain Stroke Protocol: Hyperdense left MCA which is suspicious for thrombus and   acute left MCA territory infarct.  CT Brain Perfusion Maps Stroke: Abnormal perfusion with RAPID calculated mismatch volume of   136 ml and mismatch ratio is 4.4 within the left hemisphere.  CT Angio Brain Stroke Protocol w/ IV contrast: Focal area of mild to moderate narrowing involving the origin   of the right vertebral artery. No carotid stenosis.  CT Angio Neck Stroke Protocol w/ IV contrast: Focal area of mild to moderate narrowing involving the origin   of the right vertebral artery. No carotid stenosis.    Exam:  General: Pt appears anxious and agitated.  HEENT: PERRL. EOMI.   Pulmonary: No increased work of breathing or use of accessory muscles on RA.   Neuro: AAO x2. Opens and closes eyes to commands. Mixed receptive and expressive aphasia. Moving all extremities. Strength 4/5 on LLE, LUE, RUE. Negative pronator drift.   Exts: No edema of LE. Dorsalis pedis pulses 2+ RLE and 2 LLE   Wounds: Right groin access site with no surrounding hematoma       Assessment: 76y Male with PMHx of afib (on xarelto), HTN, HLD, recent COVID infection (diagnosed 7/8/22, on Paxlovid) with L MCA stroke POD 1 s/p mechanical cerebral thrombectomy of L MCA via R groin       PLAN:    NEURO  -Vital checks q1 /Neuro checks q1 / Neurovascular checks q1       CARDIO    PULM    GI    RENAL    ENDO  - ISS, A1C:     ID  - COVID+, isolation precautions    HEME  - SCDs for DVT ppx    DISPO: ICU status, family updated, dispo pend    Assesment and Plan d/w Dr. Lunsford and Dr. Hagen          NEUROSURGERY POST OP NOTE:      75 y/o male nonsmoker PMHx Afib (on Xarelto), CVD, HTN, HLD, GERD and COVID on 7/8/22 (tx with Paxlovid) POD# 0 S/P mechanical cerebral thrombectomy of L MCA via R groin.     S: Pt seen and evaluated in PACU. Has been agitated and anxious. Unable to assess pain at this time due to global aphasia.      T(C): 35.8 (07-12-22 @ 13:25), Max: 36.8 (07-12-22 @ 11:28)  HR: 80 (07-12-22 @ 15:55) (65 - 88)  BP: 151/90 (07-12-22 @ 15:25) (129/60 - 179/100)  RR: 18 (07-12-22 @ 15:55) (15 - 24)  SpO2: 99% (07-12-22 @ 15:55) (95% - 100%)      07-12-22 @ 07:01  -  07-12-22 @ 17:14  --------------------------------------------------------  IN: 375 mL / OUT: 0 mL / NET: 375 mL        acetaminophen     Tablet .. 650 milliGRAM(s) Oral every 6 hours PRN  atorvastatin 80 milliGRAM(s) Oral at bedtime  hydrALAZINE Injectable 10 milliGRAM(s) IV Push every 4 hours PRN  ondansetron Injectable 4 milliGRAM(s) IV Push every 6 hours PRN  sodium chloride 0.9%. 1000 milliLiter(s) IV Continuous <Continuous>      RADIOLOGY:   CT Brain Stroke Protocol 7/12/22: Hyperdense left MCA which is suspicious for thrombus and   acute left MCA territory infarct.  CT Brain Perfusion Maps Stroke 7/12/22: Abnormal perfusion with RAPID calculated mismatch volume of   136 ml and mismatch ratio is 4.4 within the left hemisphere.  CT Angio Brain Stroke Protocol w/ IV contrast 7/12/22: areaof mild to moderate narrowing involving the origin   of the right vertebral artery. No carotid stenosis.  CT Angio Neck Stroke Protocol w/ IV contrast 7/12/22:Focal area of mild to moderate narrowing involving the origin   of the right vertebral artery. No carotid stenosis.      Exam:  General: Pt appears anxious and agitated. On RA.   HEENT: PERRL. EOMI. No facial droop.  Pulmonary: No increased work of breathing or use of accessory muscles.   Neuro: AAO x2 (said yes to name and hospital with choices)   Opens and closes eyes to command. Mixed receptive and expressive aphasia. Moving all extremities antigravity. Negative pronator drift.   Exts: No edema of LE. Dorsalis pedis pulses 2+RLE and 2+LLE   Wounds: Right groin access site with dressing, c/d/i no surrounding hematoma    Assessment: 76y Male with PMHx of afib (on xarelto), HTN, HLD, recent COVID infection (diagnosed 7/8/22, on Paxlovid) with L MCA stroke POD 1 s/p mechanical cerebral thrombectomy of L MCA via R groin         PLAN:  NEURO  - neuro checks/vital signs q1hr   - groin checks   - precedex gtt for agitation   - pend CTH in AM  - pend PT/OT   - stroke core measures    CARDIO  - SBP goal 120-160  - holding home Xarelto   - Lopressor 2.5 IV q6  - cont lipitor 80mg qd  - Hydralazine 10q4 PRN   - pend echo   - baseline EKG    PULM  - satting well on RA    GI  - NPO   - cont home PPI  - pend s/s eval in AM    RENAL  - Na goal 135-145  - NS@75    ENDO  - ISS, f/u AM A1C    ID  - afebrile  - COVID+, isolation precautions    HEME  - SCDs for DVT ppx  - pend LE dopplers  - no chemopraphylaxis at this time     DISPO: ICU status, family updated, dispo pend    Assesment and Plan d/w Dr. Lunsford and Dr. Hagen

## 2022-07-12 NOTE — PROGRESS NOTE ADULT - SUBJECTIVE AND OBJECTIVE BOX
HPI:  75 y/o male non-smoker PMH of Afib (on Xarelto), CVD (mid LAD, mid RCA, OM1, OM2, OM3 stents 0418-0117), HTN, HLD, GERD, and COVID infection 7/8/22 (tx w/ Paxlovid) presented to ED for unwitnessed fall around 10:15am 7/12/22. Patient's spouse states she heard a bang in a separate room at the house and found her  on the carpet. Patient was conscious, facedown, non-verbal, and moving only one of his arms (spouse could not recall right or left). No head trauma, LOC, or seizure activity was witnessed. This has never happened before.      Upon arrival to ED, patient had L gaze, aphasic, R sided plegia. CTH with hyperdense left MCA which was suspicious for thrombus and acute left MCA territory infarct. CTA with occlusion of the midportion of the left M1 segment to the proximal M2 segment. CTP with abnormal perfusion left MCA mismatch. BP in ED 170s/100.    Of note, wife reports they just got back from a 3 week trip from [facility] and were on a 12 hour flight on 7/7. Patient tested positive for COVID on 7/8 and was prescribed Paxlovid. Wife states patient usually takes his Xarelto and other medications every morning around 9 or 10am, is unsure if he took them today. Per wife, patient had been taking a reduced dose of Xarelto as he as been taking Paxlovid.   Patient is receiving care with cardiologist Dr. Brett Raya of [place] ([phone]). Patient is s/p mid LAD stent (April 2019), mid RCA stent (2016), and OM1-OM3 stents (April 2014).     Decision was made to take patient to thrombectomy with verbal consent from spouse. Patient was urgently taken to cath lab. (12 Jul 2022 13:03)    On admission, the patient was:  GCS:  Jimenez-Collier:  modified Santos:  ICH score:  NIHSS: 28    *** HIGH RISK OF DVT PRESENT ON ADMISSION ***    VITALS/LABS/IMAGING/MEDICATIONS reviewed    EXAMINATION:  General: Agitated post endovascular tx  HEENT:  MMM  Neuro: AAO x2 (to name and hospital with choices)   Follows simple commands Mixed receptive and expressive aphasia (expressive >> receptive). Moving all extremities antigravity. No obvious pronator drift.   Cards: S1/S2  Respiratory: Clear, no wheeze  Abdomen: Soft, non tender  Extremities: No edema  Skin: Warm/dry/ Pulses 2+. No hematoma

## 2022-07-12 NOTE — BRIEF OPERATIVE NOTE - NSICDXBRIEFPOSTOP_GEN_ALL_CORE_FT
POST-OP DIAGNOSIS:  CVA (cerebrovascular accident) 12-Jul-2022 13:11:56 Revascularization of left MCA (TICI 3) Srikanth Kelley

## 2022-07-12 NOTE — ED ADULT TRIAGE NOTE - NS ED TRIAGE AVPU SCALE
TRANSFER - OUT REPORT:    Verbal report given to Radha TEJADA(name) on Ebony Cardona  being transferred to 307(unit) for routine post - op       Report consisted of patients Situation, Background, Assessment and   Recommendations(SBAR). Time Pre op antibiotic given:739  Anesthesia Stop time: 082  Medina Present on Transfer to floor:y  Order for Medina on Chart:y    Information from the following report(s) SBAR, Kardex, Intake/Output and MAR was reviewed with the receiving nurse. Opportunity for questions and clarification was provided. Is the patient on 02? NO       L/Min na       Other na    Is the patient on a monitor? NO    Is the nurse transporting with the patient? NO    Surgical Waiting Area notified of patient's transfer from PACU? YES      The following personal items collected during your admission accompanied patient upon transfer:   Dental Appliance: Dental Appliances: None  Vision: Visual Aid: None  Hearing Aid:    Jewelry: Jewelry: None  Clothing: Clothing: Other (comment) (CLOTHING & SHOES TO PACU)  Other Valuables:  Other Valuables: None  Valuables sent to safe: Alert-The patient is alert, awake and responds to voice. The patient is oriented to time, place, and person. The triage nurse is able to obtain subjective information.

## 2022-07-12 NOTE — H&P ADULT - NSICDXPASTMEDICALHX_GEN_ALL_CORE_FT
PAST MEDICAL HISTORY:  Afib     GERD (gastroesophageal reflux disease)     HLD (hyperlipidemia)     HTN (hypertension)      PAST MEDICAL HISTORY:  Afib     CVD (cardiovascular disease)     GERD (gastroesophageal reflux disease)     HLD (hyperlipidemia)     HTN (hypertension)

## 2022-07-12 NOTE — CONSULT NOTE ADULT - NS ATTEND AMEND GEN_ALL_CORE FT
The patient is a 76-year-old man with atrial fibrillation for which he is on Xarelto, hypertension, hyperlipidemia, and recent COVID infection for which he was started on Paxlovid last week.  LKN was 1030 7/12 when he collapsed. NIHSS 28. CTH showed hyperdense L MCA sign, confirmed on CTA. CTP showed significant mismatch. Patient was not a tpa candidate as he had likely taken Xeralto within 3 hours prior to arrival, was on Paxlovid + Xeralto which would elevate bleeding risk regardless. Patient deemed appropriate candidate for thrombectomy and transferred to IR suite for intervention- TICI3 achieved. Of note, L ICA dissection (non-occlusive) was noted - unclear if cause of stroke or iatrogenic- will need f/u . For now, close monitoring in neuro ICU

## 2022-07-12 NOTE — BRIEF OPERATIVE NOTE - NSICDXBRIEFPREOP_GEN_ALL_CORE_FT
PRE-OP DIAGNOSIS:  CVA (cerebrovascular accident) 12-Jul-2022 13:11:35 Left MCA occlusion (TICI 0) Srikanth Kelley

## 2022-07-12 NOTE — ED PROVIDER NOTE - PHYSICAL EXAMINATION
Limited evaluation due to aphasia.    Awake, aphasic, distress. Limited evaluation due to aphasia.    Awake, aphasic, distress.     No signs of trauma to body, face, head or extremities.

## 2022-07-12 NOTE — ED ADULT NURSE NOTE - SUICIDE SCREENING QUESTION 1
Dariel Rodriguez is a 6 year old male presenting to the walk-in clinic today for sore throat and quieter than normal for past couple. Mom would like patient to be seen before any testing.   Swabs/Specimens collected during rooming process:    Other, mom would like patient seen first    PPE worn during room process  Writer: Mask, eye protection, gown, gloves    Patient would like communication of their results via:     Cell Phone:   Telephone Information:   Mobile [phone]     Okay to leave a message containing results? Yes     Patient unable to complete

## 2022-07-12 NOTE — ED PROVIDER NOTE - OBJECTIVE STATEMENT
75 yo M with hx of HTN, HLD, Afib on Xarelto (last dose unknown - definitely taken yesterday AM but may have been this AM between hours of 7am-10am), recently dx with COVID-19 last Friday after returning from [place] on Paxlovid now BIBEMS for evaluation of new R sided neglect, facial droop and AMS (aphasia), noted acutely at 10:30am today when wife heard patient fall in room next door.  EMS noted mLAMS score of 4.  No known or witnessed trauma.

## 2022-07-12 NOTE — ED ADULT NURSE NOTE - CHIEF COMPLAINT QUOTE
PT woke up with baseline mental status this morning. at 1030 wife heard PT fall. wife found PT to be nonverbal with  R sided weakness, L sided gaze, and no speech. LVO stroke code notification. on plavix for afib.  via ems

## 2022-07-12 NOTE — H&P ADULT - NSHPLABSRESULTS_GEN_ALL_CORE
15.9   6.52  )-----------( 326      ( 12 Jul 2022 11:06 )             46.6     07-12    141  |  104  |  37<H>  ----------------------------<  105<H>  4.6   |  27  |  1.36<H>    Ca    9.7      12 Jul 2022 11:06    TPro  6.9  /  Alb  4.1  /  TBili  0.3  /  DBili  x   /  AST  40  /  ALT  31  /  AlkPhos  89  07-12    PT/INR - ( 12 Jul 2022 11:06 )   PT: 10.9 sec;   INR: 0.92          PTT - ( 12 Jul 2022 11:06 )  PTT:28.5 sec  CARDIAC MARKERS ( 12 Jul 2022 11:06 )  x     / <0.01 ng/mL / x     / x     / x      RADIOLOGY & ADDITIONAL STUDIES:  CTH: Hyperdense left MCA which is suspicious for thrombus and   acute left MCA territory infarct.    CTP: Abnormal perfusion with RAPID calculated mismatch volume of   136 ml and mismatch ratio is 4.4 within the left hemisphere.    CTA: Occlusion of the midportion of the left M1 segment to the   proximal M2 segment and high-grade narrowing involving the origin of the   left anterior temporal artery. Focal area of mild to moderate narrowing involving the origin   of the right vertebral artery. No carotid stenosis.

## 2022-07-12 NOTE — CONSULT NOTE ADULT - SUBJECTIVE AND OBJECTIVE BOX
**STROKE CODE CONSULT NOTE**    Last known well time/Time of onset of symptoms: 1030am on 7/12/2022    HPI: 76y Male with PMHx of afib (on xarelto), HTN, HLD, recent COVID infection (diagnosed 7/8/22, on Paxlovid) presents to ED today by EMS. Wife explains at 10:30am she heard her  fall loudly, did not notice any seizure like activity. Upon arrival to ED, patient had L gaze, aphasic, R sided plegia. CTH with hyperdense left MCA which is suspicious for thrombus and acute left MCA territory infarct. CTA with occlusion of the midportion of the left M1 segment to the proximal M2 segment. CTP with abnormal perfusion left MCA mismatch.BP in ED 170s/100.  Of note, wife reports they just got back from a 3 week trip from [facility] and were on a 12 hour flight on 7/7. Patient tested positive for COVID on 7/8 and was prescribed Paxlovid. Wife states patient usually takes his xarelto and other medications every morning around 9 or 10am, is unsure if he took them today.   Thrombectomy was discussed between Neurology and Neuro IR Attendings and decision was made to take patient to thrombectomy.  Patient's wife, Mercedes, consented by NSGY team for thrombectomy after discussion of risk and benefits of procedure. Risks and benefits of giving tpa were discussed with Dr. Raya and risks outweighed the benefits as unclear last dose of xarelto and taking Paxlovid and Xarelto together increases risk of bleeding.     Patient was urgently taken to cath lab.     T(C): 36.8 (07-12-22 @ 11:28), Max: 36.8 (07-12-22 @ 11:28)  HR: 65 (07-12-22 @ 11:15) (65 - 80)  BP: 143/85 (07-12-22 @ 11:15) (143/85 - 179/100)  RR: 18 (07-12-22 @ 11:15) (16 - 18)  SpO2: 97% (07-12-22 @ 11:15) (95% - 97%)    PAST MEDICAL & SURGICAL HISTORY:      FAMILY HISTORY:      ROS:   limited 2/2 severity of patient's medical condition  see HPI    MEDICATIONS  (STANDING):    MEDICATIONS  (PRN):    Allergies    penicillin (Unknown)    Intolerances      Vital Signs Last 24 Hrs  T(C): 36.8 (12 Jul 2022 11:28), Max: 36.8 (12 Jul 2022 11:28)  T(F): 98.3 (12 Jul 2022 11:28), Max: 98.3 (12 Jul 2022 11:28)  HR: 65 (12 Jul 2022 11:15) (65 - 80)  BP: 143/85 (12 Jul 2022 11:15) (143/85 - 179/100)  BP(mean): --  RR: 18 (12 Jul 2022 11:15) (16 - 18)  SpO2: 97% (12 Jul 2022 11:15) (95% - 97%)    Parameters below as of 12 Jul 2022 11:15  Patient On (Oxygen Delivery Method): room air      Physical exam:  Neurologic:  -Mental status: Aphasic. Follows some commands.   -Cranial nerves:   III, IV, VI: Forced L gaze, cannot cross midline  VII: R facial droop  Motor: RUE and RLE plegia. Is able to hold LUE and LLE off the bed.   Sensation: R sided neglect  Gait: deferred    NIHSS: 28 ASPECT Score: 7    Fingerstick Blood Glucose: CAPILLARY BLOOD GLUCOSE        LABS:                        15.9   6.52  )-----------( 326      ( 12 Jul 2022 11:06 )             46.6     07-12    141  |  104  |  37<H>  ----------------------------<  105<H>  4.6   |  27  |  1.36<H>    Ca    9.7      12 Jul 2022 11:06    TPro  6.9  /  Alb  4.1  /  TBili  0.3  /  DBili  x   /  AST  40  /  ALT  31  /  AlkPhos  89  07-12    PT/INR - ( 12 Jul 2022 11:06 )   PT: 10.9 sec;   INR: 0.92          PTT - ( 12 Jul 2022 11:06 )  PTT:28.5 sec  CARDIAC MARKERS ( 12 Jul 2022 11:06 )  x     / <0.01 ng/mL / x     / x     / x          RADIOLOGY & ADDITIONAL STUDIES:  CTH: Hyperdense left MCA which is suspicious for thrombus and   acute left MCA territory infarct.    CTP: Abnormal perfusion with RAPID calculated mismatch volume of   136 ml and mismatch ratio is 4.4 within the left hemisphere.    CTA: Occlusion of the midportion of the left M1 segment to the   proximal M2 segment and high-grade narrowing involving the origin of the   left anterior temporal artery. Focal area of mild to moderate narrowing involving the origin   of the right vertebral artery. No carotid stenosis.    -----------------------------------------------------------------------------------------------------------------  IV-tPA (Y/N):  no,  unclear last dose of xarelto and taking Paxlovid and Xarelto together increases risk of bleeding.

## 2022-07-12 NOTE — PROVIDER CONTACT NOTE (OTHER) - ASSESSMENT
pt alert and at baseline neuro status.
pt more lethargic, precedex running.
pt unarousable, BP 87/52, HR 66. levo started
pt unarousable, not following simple commands. pupils are 4mm and brisk.

## 2022-07-12 NOTE — PROGRESS NOTE ADULT - ASSESSMENT
Plan:  - Neurochecks Q1. Groin and pulse checks  - BP goals 120- 160 post endovascular  - Precedex for agitation,   - Labs: A1c, lipid profile, CBC, CMP, mag/phos,. Stroke core measures  - Telemetry monitoring   - Repeat Head CT in 24hrs evaluate for hemorrhagic conversion or earlier for change in mental status  - MRI brain w/o contrast  - TTE w/ bubble study  - PT/OT after 24 hours  - NPO x 24hrs  - Dysphagia screen, Speech and Swallow Evaluation if failed dysphagia screen  - DVT PPx: SCDs for 24 hours, then start chemical AC if stability scan without hemorrhage  - Medication reconciliation.  -COVID isolation.   -Appreciate stroke neurology recommendations

## 2022-07-12 NOTE — ED ADULT NURSE NOTE - OBJECTIVE STATEMENT
As per pt's family, pt fell around 10am today, pt is A&Ox4 at baseline. Pt found to have right sided weakness, left sided gaze and aphasic. Pt able to follow simple commands with right hand. Pt on xarelto. Stroke code called.

## 2022-07-12 NOTE — PROVIDER CONTACT NOTE (OTHER) - SITUATION
pt lethargic, difficult to arouse. BP 78/48. HR 61
pt unarousable, BP 87/52 , heart rate 66
Pt hypertensive 168/80 with a heart rate of 81. 15 minutes later, blood pressure 171/79 with a heart rate of 96.
Pt more lethargic, blood pressure 78/46.

## 2022-07-12 NOTE — H&P ADULT - NSHPPHYSICALEXAM_GEN_ALL_CORE
Neurologic:  -Mental status: Aphasic. Follows some commands.   -Cranial nerves:   III, IV, VI: Forced L gaze, cannot cross midline  VII: R facial droop  Motor: RUE and RLE plegia. Is able to hold LUE and LLE off the bed.   Sensation: R sided neglect  Gait: deferred    NIHSS: 28 ASPECT Score: 7

## 2022-07-12 NOTE — CONSULT NOTE ADULT - ASSESSMENT
76y Male with PMHx of afib (on xarelto), HTN, HLD, recent COVID infection (diagnosed 7/8/22, on Paxlovid) presents to ED by EMS, found to be a thrombectomy case with L gaze, aphasia, R sided plegia.  CTH with hyperdense left MCA which is suspicious for thrombus and acute left MCA territory infarct. CTA with occlusion of the midportion of the left M1 segment to the proximal M2 segment. CTP with abnormal perfusion left MCA mismatch. Thrombectomy was discussed between Neurology and Neuro IR Attendings and decision was made to take patient to thrombectomy.  Patient's wife, Mercedes, consented by NSGY team for thrombectomy after discussion of risk and benefits of procedure. Risks and benefits of giving tpa were discussed with Dr. Raya and risks outweighed the benefits as unclear last dose of xarelto and taking Paxlovid and Xarelto together increases risk of bleeding.     1. Secondary stroke prevention  - patient will need restarting of his AC once cleared from NSGY    2. Stroke risk factors  - Atrial fibrillation  - HTN    3. Further management  - patient for thrombectomy  - post op as per NSGY  - Repeat CT head with any acute change in mental status.   - Labs: Obtain Hemoglobin A1c, Lipid profile set, and TSH  - dysphagia screen when appropriate  - Swallow evaluation if fails dysphagia screen  - PT/OT order  - Will need outpt neurology follow up  - Provide stroke education    DVT prophylaxis   - SCDs    Discussed with Neurology Attending Dr. Raya

## 2022-07-12 NOTE — H&P ADULT - HISTORY OF PRESENT ILLNESS
77 y/o male non-smoker w/ pmHx of Afib (on Xarelto), HTN, HLD, GERD, and COVID infection 7/8/22 (tx w/ Paxlovid) presented to ED for unwitnessed fall around 10:15am 7/12/22. Patient's spouse states she heard a bang in a separate room at the house and found her  on the carpet. Patient was conscious, facedown, non-verbal, and moving only one of his arms (spouse could not recall right or left). No head trauma, LOC, or seizure activity was witnessed. This has never happened before.    Upon arrival to ED, patient had L gaze, aphasic, R sided plegia. CTH with hyperdense left MCA which was suspicious for thrombus and acute left MCA territory infarct. CTA with occlusion of the midportion of the left M1 segment to the proximal M2 segment. CTP with abnormal perfusion left MCA mismatch. BP in ED 170s/100.  Of note, wife reports they just got back from a 3 week trip from [facility] and were on a 12 hour flight on 7/7. Patient tested positive for COVID on 7/8 and was prescribed Paxlovid. Wife states patient usually takes his xarelto and other medications every morning around 9 or 10am, is unsure if he took them today.   Decision was made to take patient to thrombectomy with verbal consent from spouse. Patient was urgently taken to cath lab. 75 y/o male non-smoker w/ pmHx of Afib (on Xarelto), CVD, HTN, HLD, GERD, and COVID infection 7/8/22 (tx w/ Paxlovid) presented to ED for unwitnessed fall around 10:15am 7/12/22. Patient's spouse states she heard a bang in a separate room at the house and found her  on the carpet. Patient was conscious, facedown, non-verbal, and moving only one of his arms (spouse could not recall right or left). No head trauma, LOC, or seizure activity was witnessed. This has never happened before.      Upon arrival to ED, patient had L gaze, aphasic, R sided plegia. CTH with hyperdense left MCA which was suspicious for thrombus and acute left MCA territory infarct. CTA with occlusion of the midportion of the left M1 segment to the proximal M2 segment. CTP with abnormal perfusion left MCA mismatch. BP in ED 170s/100.    Of note, wife reports they just got back from a 3 week trip from [facility] and were on a 12 hour flight on 7/7. Patient tested positive for COVID on 7/8 and was prescribed Paxlovid. Wife states patient usually takes his xarelto and other medications every morning around 9 or 10am, is unsure if he took them today. Patient is receiving care with cardiologist Dr. Brett Raya of [place]. Spouse states history of multiple cardiac stent procedures but could not provide dates and details.     Decision was made to take patient to thrombectomy with verbal consent from spouse. Patient was urgently taken to cath lab. 75 y/o male non-smoker w/ pmHx of Afib (on Xarelto), CVD (mid LAD, mid RCA, OM1, OM2, OM3 stents [id number]), HTN, HLD, GERD, and COVID infection 7/8/22 (tx w/ Paxlovid) presented to ED for unwitnessed fall around 10:15am 7/12/22. Patient's spouse states she heard a bang in a separate room at the house and found her  on the carpet. Patient was conscious, facedown, non-verbal, and moving only one of his arms (spouse could not recall right or left). No head trauma, LOC, or seizure activity was witnessed. This has never happened before.      Upon arrival to ED, patient had L gaze, aphasic, R sided plegia. CTH with hyperdense left MCA which was suspicious for thrombus and acute left MCA territory infarct. CTA with occlusion of the midportion of the left M1 segment to the proximal M2 segment. CTP with abnormal perfusion left MCA mismatch. BP in ED 170s/100.    Of note, wife reports they just got back from a 3 week trip from [facility] and were on a 12 hour flight on 7/7. Patient tested positive for COVID on 7/8 and was prescribed Paxlovid. Wife states patient usually takes his xarelto and other medications every morning around 9 or 10am, is unsure if he took them today. Patient is receiving care with cardiologist Dr. Brett Raya of [place] ([phone]). Patient is s/p mid LAD stent (April 2019), mid RCA stent (2016), and OM1-OM3 stents (April 2014).     Decision was made to take patient to thrombectomy with verbal consent from spouse. Patient was urgently taken to cath lab.

## 2022-07-12 NOTE — BRIEF OPERATIVE NOTE - OPERATION/FINDINGS
Under MAC, using a 8 fr sheath right CFA, one vessel cerebral angiogram was performed.  Findings: There is a left ICA cervical segment non occlusive dissection, there is a left distal M1 occlusion.  TICI 0  Mechanical thrombectomy was performed using aspiration x 2 with complete recanalization of left MCA. TICI 3.  An X-Per CT was performed which and reviewed by Dr. Lunsford.  There is an area of hyperdensity in the left hemisphere.   Patient tolerated procedure well,  hemodynamically stable.  Right groin/vasc access site: Angioseal and manual compression applied, hemostasis achieved, no hematoma.  Patient was transferred to PACU  Above d/w: Dr. Lunsford

## 2022-07-12 NOTE — H&P ADULT - NSICDXPASTSURGICALHX_GEN_ALL_CORE_FT
PAST SURGICAL HISTORY:  S/P ablation of atrial fibrillation      PAST SURGICAL HISTORY:  Presence of stent in LAD coronary artery     S/P ablation of atrial fibrillation     S/P arterial stent     S/P right coronary artery (RCA) stent placement

## 2022-07-12 NOTE — CONSULT NOTE ADULT - SUBJECTIVE AND OBJECTIVE BOX
77 y/o male non-smoker w/ pmHx of Afib (on Xarelto), CVD (mid LAD, mid RCA, OM1, OM2, OM3 stents [id number]), HTN, HLD, GERD, and COVID infection 7/8/22 (tx w/ Paxlovid) presented to ED for unwitnessed fall around 10:15am 7/12/22. Patient's spouse states she heard a bang in a separate room at the house and found her  on the carpet. Patient was conscious, facedown, non-verbal, and moving only one of his arms (spouse could not recall right or left). No head trauma, LOC, or seizure activity was witnessed. This has never happened before.    Upon arrival to ED, patient had L gaze, aphasic, R sided plegia. CTH with hyperdense left MCA which was suspicious for thrombus and acute left MCA territory infarct. CTA with occlusion of the midportion of the left M1 segment to the proximal M2 segment. CTP with abnormal perfusion left MCA mismatch. BP in ED 170s/100.  Of note, wife reports they just got back from a 3 week trip from [facility] and were on a 12 hour flight on 7/7. Patient tested positive for COVID on 7/8 and was prescribed Paxlovid. Wife states patient usually takes his xarelto and other medications every morning around 9 or 10am, is unsure if he took them today. Patient is receiving care with cardiologist Dr. Brett Raya of [place] ([phone]). Patient is s/p mid LAD stent (April 2019), mid RCA stent (2016), and OM1-OM3 stents (April 2014).   s/p Cerebral angiogram and thrombectomt of the left MCA.  Consulted for acute hypotension and mental status changes.    ICU Vital Signs Last 24 Hrs  T(C): 36.8 (12 Jul 2022 18:25), Max: 36.8 (12 Jul 2022 11:28)  T(F): 98.2 (12 Jul 2022 18:25), Max: 98.3 (12 Jul 2022 11:28)  HR: 66 (12 Jul 2022 19:00) (65 - 96)  BP: 78/46 (12 Jul 2022 19:00) (78/46 - 179/100)  BP(mean): 58 (12 Jul 2022 19:00) (58 - 118)  RR: 20 (12 Jul 2022 19:00) (15 - 24)  SpO2: 95% (12 Jul 2022 19:00) (90% - 100%) NC 2 l/m      CARDIAC MARKERS ( 12 Jul 2022 19:52 )  x     / 0.01 ng/mL / x     / x     / x      CARDIAC MARKERS ( 12 Jul 2022 11:06 )  x     / <0.01 ng/mL / x     / x     / x                                13.0   8.45  )-----------( 250      ( 12 Jul 2022 19:52 )             37.9       07-12    142  |  111<H>  |  30<H>  ----------------------------<  127<H>  3.9   |  20<L>  |  1.06        Case discussed with the bedside team.  Fluid bolus was initiated.  Levophed was started  POCUS of heart revealed good EF    The patient responded well and mentation improved.    IMP-  dehydration  r/o acute hemorrhagic conversion  r/o cardiac source with extensive history    Plan-  Hydration  Inotropic support as needed  Repeat CT head  Trend Troponins  monitor

## 2022-07-12 NOTE — ED ADULT NURSE NOTE - NSIMPLEMENTINTERV_GEN_ALL_ED
Implemented All Fall with Harm Risk Interventions:  [place] to call system. Call bell, personal items and telephone within reach. Instruct patient to call for assistance. Room bathroom lighting operational. Non-slip footwear when patient is off stretcher. Physically safe environment: no spills, clutter or unnecessary equipment. Stretcher in lowest position, wheels locked, appropriate side rails in place. Provide visual cue, wrist band, yellow gown, etc. Monitor gait and stability. Monitor for mental status changes and reorient to person, place, and time. Review medications for side effects contributing to fall risk. Reinforce activity limits and safety measures with patient and family. Provide visual clues: red socks.

## 2022-07-12 NOTE — H&P ADULT - PROBLEM SELECTOR PLAN 1
- thrombectomy    - post op as per NSGY - s/p mechanical thrombectomy via R groin   - neuro/vitals/pulses/puncture site check q1h  - stroke code measures: echo, LE dopplers for newly found left cervical dissection  - repeat CTH   - hold anti-thrombotics for 24 hrs   - bed rest - s/p mechanical thrombectomy via R groin   - neuro/vitals/pulses/puncture site check q1h  - stroke core measures: echo, LE dopplers   - repeat CTH in AM   - hold anti-thrombotics for 24 hrs   - bed rest.

## 2022-07-12 NOTE — PROVIDER CONTACT NOTE (OTHER) - RECOMMENDATIONS
start levophed
stop the precedex, 1 L bolus of NS given with pressure bag.
1 liter bolus ordered
hydralazine 10 mg ordered

## 2022-07-12 NOTE — ED PROVIDER NOTE - CLINICAL SUMMARY MEDICAL DECISION MAKING FREE TEXT BOX
SS noted concerning for large vessel occlusion L MCA --> M1 confirmed with perfusion mismatch, eligible for thrombectomy but not eligible for tPA given Xarelto and possible recent administration unable to be confirmed by patient or family.  All risks/benefits and alternatives weighed and discussed at length with family, stroke team and neuro-intervention team.  Pt admitted to cath lab for emergent thrombectomy.  Unknown if related is patient is COVID + on Xarelto and Paxlovid.

## 2022-07-12 NOTE — ED ADULT TRIAGE NOTE - CHIEF COMPLAINT QUOTE
PT woke up with baseline mental status this morning. at 1030 wife heard PT fall. wife found PT to be nonverbal with  R sided weakness, L sided gaze, and no speech. LVO stroke code notification. PT woke up with baseline mental status this morning. at 1030 wife heard PT fall. wife found PT to be nonverbal with  R sided weakness, L sided gaze, and no speech. LVO stroke code notification. on plavix for afib PT woke up with baseline mental status this morning. at 1030 wife heard PT fall. wife found PT to be nonverbal with  R sided weakness, L sided gaze, and no speech. LVO stroke code notification. on plavix for afib.  via ems

## 2022-07-12 NOTE — PROVIDER CONTACT NOTE (OTHER) - BACKGROUND
pt s/p thrombectomy through the right groin.
pt s/p thrombectomy through the right groin.
pt s/p thrombectomy through the right groin
s/p thrombectomy through the right groin.

## 2022-07-13 LAB
A1C WITH ESTIMATED AVERAGE GLUCOSE RESULT: 5.6 % — SIGNIFICANT CHANGE UP (ref 4–5.6)
ANION GAP SERPL CALC-SCNC: 11 MMOL/L — SIGNIFICANT CHANGE UP (ref 5–17)
BUN SERPL-MCNC: 24 MG/DL — HIGH (ref 7–23)
CALCIUM SERPL-MCNC: 8.5 MG/DL — SIGNIFICANT CHANGE UP (ref 8.4–10.5)
CHLORIDE SERPL-SCNC: 111 MMOL/L — HIGH (ref 96–108)
CHOLEST SERPL-MCNC: 124 MG/DL — SIGNIFICANT CHANGE UP
CO2 SERPL-SCNC: 21 MMOL/L — LOW (ref 22–31)
CREAT SERPL-MCNC: 0.94 MG/DL — SIGNIFICANT CHANGE UP (ref 0.5–1.3)
EGFR: 83 ML/MIN/1.73M2 — SIGNIFICANT CHANGE UP
ESTIMATED AVERAGE GLUCOSE: 114 MG/DL — SIGNIFICANT CHANGE UP (ref 68–114)
GLUCOSE BLDC GLUCOMTR-MCNC: 102 MG/DL — HIGH (ref 70–99)
GLUCOSE BLDC GLUCOMTR-MCNC: 108 MG/DL — HIGH (ref 70–99)
GLUCOSE BLDC GLUCOMTR-MCNC: 116 MG/DL — HIGH (ref 70–99)
GLUCOSE BLDC GLUCOMTR-MCNC: 96 MG/DL — SIGNIFICANT CHANGE UP (ref 70–99)
GLUCOSE SERPL-MCNC: 106 MG/DL — HIGH (ref 70–99)
HCT VFR BLD CALC: 37.3 % — LOW (ref 39–50)
HDLC SERPL-MCNC: 52 MG/DL — SIGNIFICANT CHANGE UP
HGB BLD-MCNC: 12.8 G/DL — LOW (ref 13–17)
LIPID PNL WITH DIRECT LDL SERPL: 54 MG/DL — SIGNIFICANT CHANGE UP
MAGNESIUM SERPL-MCNC: 2.2 MG/DL — SIGNIFICANT CHANGE UP (ref 1.6–2.6)
MCHC RBC-ENTMCNC: 31.2 PG — SIGNIFICANT CHANGE UP (ref 27–34)
MCHC RBC-ENTMCNC: 34.3 GM/DL — SIGNIFICANT CHANGE UP (ref 32–36)
MCV RBC AUTO: 91 FL — SIGNIFICANT CHANGE UP (ref 80–100)
NON HDL CHOLESTEROL: 72 MG/DL — SIGNIFICANT CHANGE UP
NRBC # BLD: 0 /100 WBCS — SIGNIFICANT CHANGE UP (ref 0–0)
PHOSPHATE SERPL-MCNC: 3.3 MG/DL — SIGNIFICANT CHANGE UP (ref 2.5–4.5)
PLATELET # BLD AUTO: 267 K/UL — SIGNIFICANT CHANGE UP (ref 150–400)
POTASSIUM SERPL-MCNC: 4.1 MMOL/L — SIGNIFICANT CHANGE UP (ref 3.5–5.3)
POTASSIUM SERPL-SCNC: 4.1 MMOL/L — SIGNIFICANT CHANGE UP (ref 3.5–5.3)
RBC # BLD: 4.1 M/UL — LOW (ref 4.2–5.8)
RBC # FLD: 12.7 % — SIGNIFICANT CHANGE UP (ref 10.3–14.5)
SODIUM SERPL-SCNC: 143 MMOL/L — SIGNIFICANT CHANGE UP (ref 135–145)
TRIGL SERPL-MCNC: 88 MG/DL — SIGNIFICANT CHANGE UP
TSH SERPL-MCNC: 0.97 UIU/ML — SIGNIFICANT CHANGE UP (ref 0.27–4.2)
WBC # BLD: 7.26 K/UL — SIGNIFICANT CHANGE UP (ref 3.8–10.5)
WBC # FLD AUTO: 7.26 K/UL — SIGNIFICANT CHANGE UP (ref 3.8–10.5)

## 2022-07-13 PROCEDURE — 70450 CT HEAD/BRAIN W/O DYE: CPT | Mod: 26

## 2022-07-13 PROCEDURE — 71045 X-RAY EXAM CHEST 1 VIEW: CPT | Mod: 26

## 2022-07-13 PROCEDURE — 99232 SBSQ HOSP IP/OBS MODERATE 35: CPT

## 2022-07-13 PROCEDURE — 93308 TTE F-UP OR LMTD: CPT | Mod: 26

## 2022-07-13 PROCEDURE — 99233 SBSQ HOSP IP/OBS HIGH 50: CPT

## 2022-07-13 PROCEDURE — 93321 DOPPLER ECHO F-UP/LMTD STD: CPT | Mod: 26

## 2022-07-13 RX ORDER — SENNA PLUS 8.6 MG/1
2 TABLET ORAL AT BEDTIME
Refills: 0 | Status: DISCONTINUED | OUTPATIENT
Start: 2022-07-13 | End: 2022-07-23

## 2022-07-13 RX ORDER — HYDRALAZINE HCL 50 MG
10 TABLET ORAL
Refills: 0 | Status: DISCONTINUED | OUTPATIENT
Start: 2022-07-13 | End: 2022-07-13

## 2022-07-13 RX ORDER — LABETALOL HCL 100 MG
10 TABLET ORAL
Refills: 0 | Status: DISCONTINUED | OUTPATIENT
Start: 2022-07-13 | End: 2022-07-13

## 2022-07-13 RX ORDER — SENNA PLUS 8.6 MG/1
2 TABLET ORAL AT BEDTIME
Refills: 0 | Status: DISCONTINUED | OUTPATIENT
Start: 2022-07-13 | End: 2022-07-13

## 2022-07-13 RX ORDER — ENOXAPARIN SODIUM 100 MG/ML
40 INJECTION SUBCUTANEOUS EVERY 24 HOURS
Refills: 0 | Status: DISCONTINUED | OUTPATIENT
Start: 2022-07-13 | End: 2022-07-16

## 2022-07-13 RX ADMIN — ATORVASTATIN CALCIUM 80 MILLIGRAM(S): 80 TABLET, FILM COATED ORAL at 22:34

## 2022-07-13 RX ADMIN — PANTOPRAZOLE SODIUM 40 MILLIGRAM(S): 20 TABLET, DELAYED RELEASE ORAL at 12:49

## 2022-07-13 RX ADMIN — CHLORHEXIDINE GLUCONATE 1 APPLICATION(S): 213 SOLUTION TOPICAL at 06:57

## 2022-07-13 RX ADMIN — Medication 10 MILLIGRAM(S): at 10:36

## 2022-07-13 RX ADMIN — ENOXAPARIN SODIUM 40 MILLIGRAM(S): 100 INJECTION SUBCUTANEOUS at 22:34

## 2022-07-13 RX ADMIN — Medication 10 MILLIGRAM(S): at 10:19

## 2022-07-13 NOTE — SWALLOW BEDSIDE ASSESSMENT ADULT - NS SPL SWALLOW CLINIC TRIAL FT
Pt presents with functional bruce-pharyngeal swallow on this exam with no overt signs of airway protection deficits. Can initiate regular diet with thin liquids. This service will f/u with speech language evaluation

## 2022-07-13 NOTE — PROGRESS NOTE ADULT - ASSESSMENT
76y Male with PMHx of afib (on xarelto), HTN, HLD, recent COVID infection (diagnosed 7/8/22, on Paxlovid) presents to ED by EMS, found to be a thrombectomy case with L gaze, aphasia, R sided plegia.  CTH showed hyperdense L MCA sign, confirmed on CTA. CTP showed significant mismatch. Patient was not a tpa candidate as he had likely taken Xeralto within 3 hours prior to arrival, was on Paxlovid + Xeralto which would elevate bleeding risk regardless. Patient deemed appropriate candidate for thrombectomy and transferred to IR suite for intervention- TICI3 achieved. Of note, L ICA dissection (non-occlusive) was noted - unclear if cause of stroke or iatrogenic- will need f/u . For now, close monitoring in neuro ICU.     INCOMPLETE    1. Secondary stroke prevention  - patient will need restarting of his AC once cleared from NSGY    2. Stroke risk factors  - Atrial fibrillation  - HTN    3. Further management  - post op as per NSGY  - repeat CTH 7/12 PM: Interval appearance of left sylvian fissure and no lesion   parietal subarachnoid hemorrhage. Likely contrast staining??.  - pending CTH this AM  - Labs: A1C: 5.6, LDL: 54, TSH: 0.975  - dysphagia screen when appropriate  - Swallow evaluation if fails dysphagia screen  - PT/OT/SLP  - Repeat CT head with any acute change in mental status.   - Will need outpt neurology follow up  - Provide stroke education    DVT prophylaxis   - SCDs    Discussed with Neurology Attending Dr. Raya     76y Male with PMHx of afib (on xarelto), HTN, HLD, recent COVID infection (diagnosed 7/8/22, on Paxlovid) presents to ED by EMS, found to be a thrombectomy case with L gaze, aphasia, R sided plegia.  CTH showed hyperdense L MCA sign, confirmed on CTA. CTP showed significant mismatch. Patient was not a tpa candidate as he had likely taken Xeralto within 3 hours prior to arrival, was on Paxlovid + Xeralto which would elevate bleeding risk regardless. Patient deemed appropriate candidate for thrombectomy and transferred to IR suite for intervention- TICI3 achieved. Of note, L ICA dissection (non-occlusive) was noted - unclear if cause of stroke or iatrogenic- will need f/u. For now, close monitoring in neuro ICU.     1. Secondary stroke prevention  - patient will need restarting of his AC once cleared from NSGY    2. Stroke risk factors  - Atrial fibrillation  - HTN    3. Further management  - post op as per NSGY  - BP <160 as pt may have possible bleed  - repeat CTH 7/12 PM: Interval appearance of left sylvian fissure and no lesion   parietal subarachnoid hemorrhage. SAH vs contrast  - repeat CTH 7/13: pending read  - Labs: A1C: 5.6, LDL: 54, TSH: 0.975  - dysphagia screen when appropriate  - Swallow evaluation if fails dysphagia screen  - PT/OT/SLP  - Repeat CT head with any acute change in mental status.   - Will need outpt neurology follow up  - Provide stroke education    DVT prophylaxis   - SCDs    Discussed with Neurology Attending Dr. Raya     76y Male with PMHx of Afib (on Xarelto), CVD (mid LAD, mid RCA, OM1, OM2, OM3 stents [id number]), HTN, HLD, GERD, and COVID infection 7/8/22 (tx w/ Paxlovid) presents to ED by EMS, found to be a thrombectomy case with L gaze, aphasia, R sided plegia. CTH showed hyperdense L MCA sign, confirmed on CTA. CTP showed significant mismatch. Patient was not a tpa candidate as he had likely taken Xeralto within 3 hours prior to arrival, was on Paxlovid + Xeralto which would elevate bleeding risk regardless. Patient deemed appropriate candidate for thrombectomy and transferred to IR suite for intervention- TICI3 achieved. Of note, L ICA dissection (non-occlusive) was noted - unclear if cause of stroke or iatrogenic- will need f/u. Exam improving. CTH with SAH vs. contrast extravasation on L sylvian fissure. Holding home xarelto for now. Pending dysphagia screen. Stable for step down to 7 l[facility].    Neuro  #CVA workup  - holding home xarelto for now  - continue atorvastatin 80mg daily once dysphagia passed  - q4hr stroke neuro checks and vitals  - Stroke Code HCT Results: Hyperdense left MCA which is suspicious for thrombus and acute left MCA territory infarct.  - Stroke Code CTA Results: Occlusion of the midportion of the left M1 segment to the proximal M2 segment and high-grade narrowing involving the origin of the left anterior temporal artery. Focal area of mild to moderate narrowing involving the origin of the right vertebral artery. No carotid stenosis.  - repeat CTH 7/12 PM: Interval appearance of left sylvian fissure and no lesion parietal subarachnoid hemorrhage. SAH vs contrast  - repeat CTH 7/13: pending read  - PT/OT/SLP  - Stroke education    Cards  #HTN  - BP <160 d/t possible bleed  - obtain echo    #HLD  - high dose statin as above in CVA  - LDL results: 54    Pulm  - call provider if SPO2 < 94%    GI  #Nutrition/Fluids/Electrolytes   - replete K<4 and Mg <2  - Diet: NPO pending s/s eval  - NS@75 until tolerating full diet  - cont home PPI    Renal  - BUN 24/Cr 0.94  - trend renal function  - NS@75 until tolerating full diet    Infectious Disease  COVID + 7/8, started on paxlovid OSH  - afebrile  - COVID+, isolation precautions  - CXR: Heart size within normal limits, thoracic aortic   calcification. Lungs and mediastinum are unremarkable. Thoracic spine   degenerative changes, dextroscoliosis. Elevation of the right   hemidiaphragm.    Endocrine  - A1C results: 5.6  - euglycemic goal  - TSH results: 0.975    DVT Prophylaxis  - SCDs for DVT prophylaxis   - pend LE dopplers      Dispo: admit to stroke     Discussed daily hospital plans and goals with patient and family at bedside. (Called and updated family.)    Discussed with Neurology Attending Dr. Raya     76y Male with PMHx of Afib (on Xarelto), CVD (mid LAD, mid RCA, OM1, OM2, OM3 stents 7875-8086), HTN, HLD, GERD, and COVID infection 7/8/22 (tx w/ Paxlovid) presents to ED by EMS, found to be a thrombectomy case with L gaze, aphasia, R sided plegia. CTH showed hyperdense L MCA sign, confirmed on CTA. CTP showed significant mismatch. Patient was not a tpa candidate as he had likely taken Xeralto within 3 hours prior to arrival, was on Paxlovid + Xeralto which would elevate bleeding risk regardless. Patient deemed appropriate candidate for thrombectomy and transferred to IR suite for intervention- TICI3 achieved. Of note, L ICA dissection (non-occlusive) was noted - unclear if cause of stroke or iatrogenic- will need f/u. Exam improving. CTH with SAH vs. contrast extravasation on L sylvian fissure. Holding home xarelto for now. Stable for step down to 7 l[facility].    Neuro  #CVA workup  - holding home xarelto for now  - continue atorvastatin 80mg daily   - q4hr stroke neuro checks and vitals  - Stroke Code HCT Results: Hyperdense left MCA which is suspicious for thrombus and acute left MCA territory infarct.  - Stroke Code CTA Results: Occlusion of the midportion of the left M1 segment to the proximal M2 segment and high-grade narrowing involving the origin of the left anterior temporal artery. Focal area of mild to moderate narrowing involving the origin of the right vertebral artery. No carotid stenosis.  - repeat CTH 7/12 PM: Interval appearance of left sylvian fissure and no lesion parietal subarachnoid hemorrhage. SAH vs contrast  - repeat CTH 7/13: pending read  - PT/OT/SLP  - Stroke education    Cards  #HTN  - BP <160 d/t possible bleed  - obtain echo    #HLD  - high dose statin as above in CVA  - LDL results: 54    Pulm  - call provider if SPO2 < 94%    GI  #Nutrition/Fluids/Electrolytes   - replete K<4 and Mg <2  - Diet: regular  - cont home PPI    Renal  - BUN 24/Cr 0.94  - trend renal function    Infectious Disease  COVID + 7/8, started on paxlovid OSH  - afebrile  - COVID+, isolation precautions  - CXR: Heart size within normal limits, thoracic aortic   calcification. Lungs and mediastinum are unremarkable. Thoracic spine   degenerative changes, dextroscoliosis. Elevation of the right   hemidiaphragm.    Endocrine  - A1C results: 5.6  - euglycemic goal  - TSH results: 0.975    DVT Prophylaxis  - SCDs for DVT prophylaxis   - pend LE dopplers    Dispo: transfer to stroke     Discussed daily hospital plans and goals with patient and family at bedside. (Called and updated family.)    Discussed with Neurology Attending Dr. Raya       76y Male with PMHx of Afib (on Xarelto), CVD (mid LAD, mid RCA, OM1, OM2, OM3 stents [id number]), HTN, HLD, GERD, and COVID infection 7/8/22 (tx w/ Paxlovid) presents to ED by EMS, found to be a thrombectomy case with L gaze, aphasia, R sided plegia. CTH showed hyperdense L MCA sign, confirmed on CTA. CTP showed significant mismatch. Patient was not a tpa candidate as he had likely taken Xeralto within 3 hours prior to arrival, was on Paxlovid + Xeralto which would elevate bleeding risk regardless. Patient deemed appropriate candidate for thrombectomy and transferred to IR suite for intervention- TICI3 achieved. Of note, L ICA dissection (non-occlusive) was noted - unclear if cause of stroke or iatrogenic- will need f/u. Exam improving. CTH with SAH vs. contrast extravasation on L sylvian fissure. Holding home xarelto for now. Stable for step down to 7 [facility].    Neuro  #CVA workup  - holding home xarelto for now  - continue atorvastatin 80mg daily   - q4hr stroke neuro checks and vitals  - Stroke Code HCT Results: Hyperdense left MCA which is suspicious for thrombus and acute left MCA territory infarct.  - Stroke Code CTA Results: Occlusion of the midportion of the left M1 segment to the proximal M2 segment and high-grade narrowing involving the origin of the left anterior temporal artery. Focal area of mild to moderate narrowing involving the origin of the right vertebral artery. No carotid stenosis.  - repeat CTH 7/12 PM: Interval appearance of left sylvian fissure and no lesion parietal subarachnoid hemorrhage. SAH vs contrast  - repeat CTH 7/13: pending read. Likely contrast.  - PT/OT/SLP  - Stroke education    Cards  #HTN  - BP <160 d/t possible bleed  - hold off on starting home BP meds   - echo:  Limited study obtained for evaluation of left ventricular function. Normal left and right ventricular size and systolic function. Mildly dilated left atrium. No pericardial effusion. The left ventricle is normal in size and systolic function with a calculated ejection fraction of 65%.      #HLD  - high dose statin as above in CVA  - LDL results: 54    Pulm  - call provider if SPO2 < 94%    GI  #Nutrition/Fluids/Electrolytes   - replete K<4 and Mg <2  - Diet: regular  - cont home PPI    Renal  - BUN 24/Cr 0.94  - trend renal function    Infectious Disease  COVID + 7/8, started on paxlovid OSH  - afebrile  - COVID+, isolation precautions  - CXR: Heart size within normal limits, thoracic aortic   calcification. Lungs and mediastinum are unremarkable. Thoracic spine   degenerative changes, dextroscoliosis. Elevation of the right   hemidiaphragm.    Endocrine  - A1C results: 5.6  - euglycemic goal  - TSH results: 0.975    DVT Prophylaxis  - SCDs for DVT prophylaxis   - pend LE dopplers    Dispo: transfer to stroke  PT/OT: AR     Discussed daily hospital plans and goals with patient and family at bedside. (Called and updated family.)    Discussed with Neurology Attending Dr. Raya       76y Male with PMHx of Afib (on Xarelto), CVD (mid LAD, mid RCA, OM1, OM2, OM3 stents [phone]), HTN, HLD, GERD, and COVID infection 7/8/22 (tx w/ Paxlovid) presents to ED by EMS, found to be a thrombectomy case with L gaze, aphasia, R sided plegia. CTH showed hyperdense L MCA sign, confirmed on CTA. CTP showed significant mismatch. Patient was not a tpa candidate as he had likely taken Xeralto within 3 hours prior to arrival, was on Paxlovid + Xeralto which would elevate bleeding risk regardless. Patient deemed appropriate candidate for thrombectomy and transferred to IR suite for intervention- TICI3 achieved. Of note, L ICA dissection (non-occlusive) was noted - unclear if cause of stroke or iatrogenic- will need f/u. Exam improving. CTH with SAH vs. contrast extravasation on L sylvian fissure. Holding home xarelto for now. Stable for step down to 7 lachman for continuation of stroke workup.    Neuro  #CVA workup  - holding home xarelto for now  - continue atorvastatin 80mg daily   - q4hr stroke neuro checks and vitals  - Stroke Code HCT Results: Hyperdense left MCA which is suspicious for thrombus and acute left MCA territory infarct.  - Stroke Code CTA Results: Occlusion of the midportion of the left M1 segment to the proximal M2 segment and high-grade narrowing involving the origin of the left anterior temporal artery. Focal area of mild to moderate narrowing involving the origin of the right vertebral artery. No carotid stenosis.  - repeat CTH 7/12 PM: Interval appearance of left sylvian fissure and no lesion parietal subarachnoid hemorrhage. SAH vs contrast  - repeat CTH 7/13: pending read. Likely contrast.  - PT/OT/SLP  - Stroke education    Cards  #HTN  - BP <160 d/t possible bleed  - hold off on starting home BP meds   - echo:  Limited study obtained for evaluation of left ventricular function. Normal left and right ventricular size and systolic function. Mildly dilated left atrium. No pericardial effusion. The left ventricle is normal in size and systolic function with a calculated ejection fraction of 65%.      #HLD  - high dose statin as above in CVA  - LDL results: 54    Pulm  - call provider if SPO2 < 94%    GI  #Nutrition/Fluids/Electrolytes   - replete K<4 and Mg <2  - Diet: regular  - cont home PPI    Renal  - BUN 24/Cr 0.94  - trend renal function    Infectious Disease  COVID + 7/8, started on paxlovid OSH  - afebrile  - COVID+, isolation precautions  - CXR: Heart size within normal limits, thoracic aortic   calcification. Lungs and mediastinum are unremarkable. Thoracic spine   degenerative changes, dextroscoliosis. Elevation of the right   hemidiaphragm.    Endocrine  - A1C results: 5.6  - euglycemic goal  - TSH results: 0.975    DVT Prophylaxis  - SCDs for DVT prophylaxis   - pend LE dopplers    Dispo: transfer to stroke  PT/OT: AR     Discussed daily hospital plans and goals with patient and family at bedside. (Called and updated family.)    Discussed with Neurology Attending Dr. Raya       76y Male with PMHx of Afib (on Xarelto), CVD (mid LAD, mid RCA, OM1, OM2, OM3 stents [phone]), HTN, HLD, GERD, and COVID infection 7/8/22 (tx w/ Paxlovid) presents to ED by EMS, found to be a thrombectomy case with L gaze, aphasia, R sided plegia. CTH showed hyperdense L MCA sign, confirmed on CTA. CTP showed significant mismatch. Patient was not a tpa candidate as he had likely taken Xeralto within 3 hours prior to arrival, was on Paxlovid + Xeralto which would elevate bleeding risk regardless. Patient deemed appropriate candidate for thrombectomy and transferred to IR suite for intervention- TICI3 achieved. Of note, L ICA dissection (non-occlusive) was noted - unclear if cause of stroke or iatrogenic- will need f/u. Exam improving. CTH with SAH vs. contrast extravasation on L sylvian fissure. Holding home xarelto for now. Stable for step down to 7 l[facility] for continuation of stroke workup.    Neuro  #CVA workup  - holding home xarelto for now  - continue atorvastatin 80mg daily   - q4hr stroke neuro checks and vitals  - Stroke Code HCT Results: Hyperdense left MCA which is suspicious for thrombus and acute left MCA territory infarct.  - Stroke Code CTA Results: Occlusion of the midportion of the left M1 segment to the proximal M2 segment and high-grade narrowing involving the origin of the left anterior temporal artery. Focal area of mild to moderate narrowing involving the origin of the right vertebral artery. No carotid stenosis.  - repeat CTH 7/12 PM: Interval appearance of left sylvian fissure and no lesion parietal subarachnoid hemorrhage. SAH vs contrast  - repeat CTH 7/13: pending read. Likely contrast.  - PT/OT/SLP  - Stroke education    Cards  #HTN  - -160  - hold off on starting home BP meds   - echo:  Limited study obtained for evaluation of left ventricular function. Normal left and right ventricular size and systolic function. Mildly dilated left atrium. No pericardial effusion. The left ventricle is normal in size and systolic function with a calculated ejection fraction of 65%.      #HLD  - high dose statin as above in CVA  - LDL results: 54    Pulm  - call provider if SPO2 < 94%    GI  #Nutrition/Fluids/Electrolytes   - replete K<4 and Mg <2  - Diet: regular  - cont home PPI    Renal  - BUN 24/Cr 0.94  - trend renal function    Infectious Disease  COVID + 7/8, started on paxlovid OSH  - afebrile  - COVID+, isolation precautions  - CXR: Heart size within normal limits, thoracic aortic   calcification. Lungs and mediastinum are unremarkable. Thoracic spine   degenerative changes, dextroscoliosis. Elevation of the right   hemidiaphragm.    Endocrine  - A1C results: 5.6  - euglycemic goal  - TSH results: 0.975    DVT Prophylaxis  - SCDs for DVT prophylaxis   - pend LE dopplers    Dispo: transfer to stroke  PT/OT: AR     Discussed daily hospital plans and goals with patient and family at bedside. (Called and updated family.)    Discussed with Neurology Attending Dr. Raya       76y Male with PMHx of Afib (on Xarelto), CVD (mid LAD, mid RCA, OM1, OM2, OM3 stents [phone]), HTN, HLD, GERD, and COVID infection 7/8/22 (tx w/ Paxlovid) presents to ED by EMS, found to be a thrombectomy case with L gaze, aphasia, R sided plegia. CTH showed hyperdense L MCA sign, confirmed on CTA. CTP showed significant mismatch. Patient was not a tpa candidate as he had likely taken Xeralto within 3 hours prior to arrival, was on Paxlovid + Xeralto which would elevate bleeding risk regardless. Patient deemed appropriate candidate for thrombectomy and transferred to IR suite for intervention- TICI3 achieved. Of note, L ICA dissection (non-occlusive) was noted - unclear if cause of stroke or iatrogenic- will need f/u. Exam improving. CTH with SAH vs. contrast extravasation on L sylvian fissure. Holding home xarelto for now. Stable for step down to 7 l[facility] for continuation of stroke workup.    Neuro  #CVA workup  - holding home xarelto for now  - continue atorvastatin 80mg daily   - q4hr stroke neuro checks and vitals  - Stroke Code HCT Results: Hyperdense left MCA which is suspicious for thrombus and acute left MCA territory infarct.  - Stroke Code CTA Results: Occlusion of the midportion of the left M1 segment to the proximal M2 segment and high-grade narrowing involving the origin of the left anterior temporal artery. Focal area of mild to moderate narrowing involving the origin of the right vertebral artery. No carotid stenosis.  - repeat CTH with SAH vs. contrast extravasation on L sylvian fissure.  - PT/OT/SLP  - Stroke education    Cards  #HTN  - -160  - hold off on starting home BP meds   - echo:  Limited study obtained for evaluation of left ventricular function. Normal left and right ventricular size and systolic function. Mildly dilated left atrium. No pericardial effusion. The left ventricle is normal in size and systolic function with a calculated ejection fraction of 65%.      #HLD  - high dose statin as above in CVA  - LDL results: 54    Pulm  - call provider if SPO2 < 94%    GI  #Nutrition/Fluids/Electrolytes   - replete K<4 and Mg <2  - Diet: regular  - cont home PPI    Renal  - BUN 24/Cr 0.94  - trend renal function    Infectious Disease  COVID + 7/8, started on paxlovid OSH  - afebrile  - COVID+, isolation precautions  - CXR: Heart size within normal limits, thoracic aortic   calcification. Lungs and mediastinum are unremarkable. Thoracic spine   degenerative changes, dextroscoliosis. Elevation of the right   hemidiaphragm.    Endocrine  - A1C results: 5.6  - euglycemic goal  - TSH results: 0.975    DVT Prophylaxis  - SCDs for DVT prophylaxis   - pend LE dopplers    Dispo: transfer to stroke  PT/OT: AR     Discussed daily hospital plans and goals with patient and family at bedside. (Called and updated family.)    Discussed with Neurology Attending Dr. Raya       76y Male with PMHx of Afib (on Xarelto), CVD (mid LAD, mid RCA, OM1, OM2, OM3 stents [phone]), HTN, HLD, GERD, and COVID infection 7/8/22 (tx w/ Paxlovid) presents to ED by EMS, found to be a thrombectomy case with L gaze, aphasia, R sided plegia. CTH showed hyperdense L MCA sign, confirmed on CTA. CTP showed significant mismatch. Patient was not a tpa candidate as he had likely taken Xeralto within 3 hours prior to arrival, was on Paxlovid + Xeralto which would elevate bleeding risk regardless. Patient deemed appropriate candidate for thrombectomy and transferred to IR suite for intervention- TICI3 achieved. Of note, L ICA dissection (non-occlusive) was noted - unclear if cause of stroke or iatrogenic- will need f/u. Exam improving. CTH with SAH vs. contrast extravasation on L sylvian fissure. Holding home xarelto for now. Stable for step down to 7 lachman for continuation of stroke workup.    Neuro  #CVA workup  - holding home xarelto for now  - continue atorvastatin 80mg daily   - q4hr stroke neuro checks and vitals  - Stroke Code HCT Results: Hyperdense left MCA which is suspicious for thrombus and acute left MCA territory infarct.  - Stroke Code CTA Results: Occlusion of the midportion of the left M1 segment to the proximal M2 segment and high-grade narrowing involving the origin of the left anterior temporal artery. Focal area of mild to moderate narrowing involving the origin of the right vertebral artery. No carotid stenosis.  - repeat CTH with SAH vs. contrast extravasation on L sylvian fissure.  - f/u MRI non con with GRE  - PT/OT/SLP  - Stroke education    Cards  #HTN  - -160  - hold off on starting home BP meds   - echo:  Limited study obtained for evaluation of left ventricular function. Normal left and right ventricular size and systolic function. Mildly dilated left atrium. No pericardial effusion. The left ventricle is normal in size and systolic function with a calculated ejection fraction of 65%.      #HLD  - high dose statin as above in CVA  - LDL results: 54    Pulm  - call provider if SPO2 < 94%    GI  #Nutrition/Fluids/Electrolytes   - replete K<4 and Mg <2  - Diet: regular  - cont home PPI    Renal  - BUN 24/Cr 0.94  - trend renal function    Infectious Disease  COVID + 7/8, started on paxlovid OSH  - afebrile  - COVID+, isolation precautions  - CXR: Heart size within normal limits, thoracic aortic   calcification. Lungs and mediastinum are unremarkable. Thoracic spine   degenerative changes, dextroscoliosis. Elevation of the right   hemidiaphragm.    Endocrine  - A1C results: 5.6  - euglycemic goal  - TSH results: 0.975    DVT Prophylaxis  - SCDs and lovenox (started 7/13) for DVT prophylaxis   - pend LE dopplers    Dispo: transfer to stroke  PT/OT: AR     Discussed daily hospital plans and goals with patient and family at bedside. (Called and updated family.)    Discussed with Neurology Attending Dr. Raya

## 2022-07-13 NOTE — DIETITIAN INITIAL EVALUATION ADULT - NSICDXPASTSURGICALHX_GEN_ALL_CORE_FT
PAST SURGICAL HISTORY:  Presence of stent in LAD coronary artery     S/P ablation of atrial fibrillation     S/P arterial stent     S/P right coronary artery (RCA) stent placement

## 2022-07-13 NOTE — OCCUPATIONAL THERAPY INITIAL EVALUATION ADULT - DIAGNOSIS, OT EVAL
Pt presents s/p cerebral angio with thrombectomy L ICA performed on 7/12/22. Pt presents with expressive aphasia, decreased RUE coordination, RUE/RLE weakness, decreased balance, and decreased functional endurance impacting his ability to independently complete ADLs.

## 2022-07-13 NOTE — SWALLOW BEDSIDE ASSESSMENT ADULT - COMMENTS
Pt awake, alert, attempting to express needs/wants. Receptive and expressive aphasia. Full speech language evaluation to be completed.

## 2022-07-13 NOTE — PROGRESS NOTE ADULT - NS ATTEND AMEND GEN_ALL_CORE FT
The patient is a 76-year-old man with atrial fibrillation (previuosly on Xarelto), hypertension, hyperlipidemia, and recent COVID infection for which he was started on Paxlovid last week.  Initial NIHSS 28 for a left MCA syndrome. He was not a tpa candidate given it was unclear if he was taking Xeralto/last dose. He s/p successful thrombectomy. Repeat HCT shows area of contrast staining +/- SAD.  Clinically, he has improved significantly - Plan to obtain MRI brain and TTE for further evaluation. WHen able, will likely transition to Eliquis for better stroke prevention in future.  Will hold off on aspirin for now given potential for small amount of hemorrhage related to thrombectomy. Will need f/u of carotid dissection (?Iatrogenic vs related to stroke)

## 2022-07-13 NOTE — PHYSICAL THERAPY INITIAL EVALUATION ADULT - DIAGNOSIS, PT EVAL
5A: Primary Prevention/Risk Reduction for Loss of Balance and Falling; 5D: Impaired Motor Function and Sensory Integrity Associated with Nonprogressive Disorders of the Central Nervous System—Acquired in Adolescence or Adulthood

## 2022-07-13 NOTE — DIETITIAN INITIAL EVALUATION ADULT - PERTINENT MEDS FT
MEDICATIONS  (STANDING):  atorvastatin 80 milliGRAM(s) Oral at bedtime  chlorhexidine 2% Cloths 1 Application(s) Topical <User Schedule>  enoxaparin Injectable 40 milliGRAM(s) SubCutaneous every 24 hours  insulin lispro (ADMELOG) corrective regimen sliding scale   SubCutaneous Before meals and at bedtime  pantoprazole  Injectable 40 milliGRAM(s) IV Push daily    MEDICATIONS  (PRN):  acetaminophen     Tablet .. 650 milliGRAM(s) Oral every 6 hours PRN Temp greater or equal to 38C (100.4F), Mild Pain (1 - 3)  ondansetron Injectable 4 milliGRAM(s) IV Push every 6 hours PRN Nausea and/or Vomiting  senna 2 Tablet(s) Oral at bedtime PRN Constipation

## 2022-07-13 NOTE — OCCUPATIONAL THERAPY INITIAL EVALUATION ADULT - NS ASR APHASIA TYPE OT
able to answer yes/no questions with ~90% accuracy/expressive able to answer yes/no questions with ~90% accuracy, form simple sentences, and state numbers for vision quadrant testing/expressive

## 2022-07-13 NOTE — PHYSICAL THERAPY INITIAL EVALUATION ADULT - MANUAL MUSCLE TESTING RESULTS, REHAB EVAL
MMT performed: (L)UE and (L)LE 5/5 for all major pivots; (R)shoulder flexion 4/5, (R)elbow flexion 5/5, (R)elbow extension 5/5, (R)wrist extension N/T, (R)wrist flexion N/T; (R)hip flexion 4/5, (R)knee extension 5/5, (R)knee flexion 5/5, (R)ankle DF 5/5, (R)william PF 5/5

## 2022-07-13 NOTE — OCCUPATIONAL THERAPY INITIAL EVALUATION ADULT - MD ORDER
77 y/o male non-smoker w/ pmHx of Afib (on Xarelto), CVD (mid LAD, mid RCA, OM1, OM2, OM3 stents [id number]), HTN, HLD, GERD, and COVID infection 7/8/22 (tx w/ Paxlovid) presented to ED for unwitnessed fall around 10:15am 7/12/22. Upon arrival to ED, patient had L gaze, aphasic, R sided plegia. CTH with hyperdense left MCA was suspicious for thrombus and acute left MCA territory infarct. Spousal consent was given and patient was rushed to cath lab for thrombectomy.

## 2022-07-13 NOTE — PHYSICAL THERAPY INITIAL EVALUATION ADULT - GENERAL OBSERVATIONS, REHAB EVAL
PT IE Completed. Chart reviewed. MRS 4. Pt received semi-supine, NAD, +L IV, +[place]. NAT Ferrer and MD Hagen cleared pt for PT.

## 2022-07-13 NOTE — DIETITIAN INITIAL EVALUATION ADULT - CALCULATED TO (CAL/KG)
Alert-The patient is alert, awake and responds to voice. The patient is oriented to time, place, and person. The triage nurse is able to obtain subjective information.
[id number]

## 2022-07-13 NOTE — PROGRESS NOTE ADULT - SUBJECTIVE AND OBJECTIVE BOX
[facility] Progress Note    Assessment/Hospital Course:    76y Male with PMHx of afib (on xarelto), HTN, HLD, recent COVID infection (diagnosed 7/8/22, on Paxlovid) with L MCA stroke s/p mechanical cerebral thrombectomy of L distal M1 occlusion TICI 3 (7/12).      Hospital Course:    7/13: POD0 s/p  L MCA stroke s/p mechanical cerebral thrombectomy of L distal M1 occlusion TICI 0 to 3. Hypotensive, responsive to IVB.  7/14: overnight  low dose precedex restarted for agitation and then stopped due to SBP below goal. Recieved 250cc bolus 5% albumin for SBP in low 100s. BP drops 100s-110s when sleeping and in 150s when awake overnight.    24 Hour Events/Subjective:  - stroke day 3  - PAD 3  - BP stable overnight      REVIEW OF SYSTEMS:  - negative except as above    VITALS:   - T(C): 36.7 (07-13-22 @ 05:39), Max: 36.8 (07-12-22 @ 11:28)  T(F): 98 (07-13-22 @ 05:39), Max: 98.3 (07-12-22 @ 11:28)  HR: 72 (07-13-22 @ 05:00) (61 - 96)  BP: 128/63 (07-13-22 @ 05:00) (78/46 - 179/100)  ABP: --  ABP(mean): --  RR: 18 (07-13-22 @ 05:00) (15 - 24)  SpO2: 98% (07-13-22 @ 05:00) (90% - 100%)      IMAGING/DATA:   - Reviewed          PHYSICAL EXAM:    General: calm  CVS: RRR  Pulm: CTAB  GI: Soft, NTND  Extremities: No LE Edema  Neuro: AOx3, PERRL, EOMI, facial symmetrical, fluent speech, motor 5/5 throughout, no PND, sensation in tact   NSCU Progress Note    Assessment/Hospital Course:    76y Male with PMHx of afib (on xarelto), HTN, HLD, recent COVID infection (diagnosed 7/8/22, on Paxlovid) with L MCA stroke s/p mechanical cerebral thrombectomy of L distal M1 occlusion TICI 3 (7/12).      Hospital Course:    7/12: POD0 s/p  L MCA stroke s/p mechanical cerebral thrombectomy of L distal M1 occlusion TICI 0 to 3. Hypotensive, responsive to IVB.  7/13: POD1 overnight  low dose precedex restarted for agitation and then stopped due to SBP below goal. Recieved 250cc bolus 5% albumin for SBP in low 100s. BP drops 100s-110s when sleeping and in 150s when awake overnight.    24 Hour Events/Subjective:  - stroke day 2  - PAD 2  - BP stable overnight  - CTH with SAH vs. contrast extravasation on L sylvian fissure      REVIEW OF SYSTEMS:  - negative except as above    Objective:    T(C): 36.7 (07-13-22 @ 05:39), Max: 36.8 (07-12-22 @ 11:28)  T(F): 98 (07-13-22 @ 05:39), Max: 98.3 (07-12-22 @ 11:28)  HR: 76 (07-13-22 @ 06:00) (61 - 96)  BP: 135/67 (07-13-22 @ 06:00) (78/46 - 179/100)  ABP: --  ABP(mean): --  RR: 16 (07-13-22 @ 06:00) (15 - 24)  SpO2: 99% (07-13-22 @ 06:00) (90% - 100%)                          12.8   7.26  )-----------( 267      ( 13 Jul 2022 05:08 )             37.3     07-13    143  |  111<H>  |  24<H>  ----------------------------<  106<H>  4.1   |  21<L>  |  0.94    Ca    8.5      13 Jul 2022 05:08  Phos  3.3     07-13  Mg     2.2     07-13    TPro  5.6<L>  /  Alb  3.5  /  TBili  0.4  /  DBili  0.2  /  AST  28  /  ALT  22  /  AlkPhos  67  07-12      PT/INR - ( 12 Jul 2022 19:52 )   PT: 12.6 sec;   INR: 1.06          PTT - ( 12 Jul 2022 19:52 )  PTT:26.1 sec          PHYSICAL EXAM:    General: calm  CVS: RRR  Pulm: CTAB  GI: Soft, NTND  Extremities: No LE Edema  Neuro: awake, alert, Ox1 (self) with choice, does not follow most verbal commands but mimics, receptive and expressive aphasia, FENG x 3 at least antigravity, +RUE pronator drift     NSCU Progress Note    Assessment/Hospital Course:    76y Male with PMHx of afib (on xarelto, recently decreased?), HTN, HLD, recent COVID infection (diagnosed 7/8/22, on Paxlovid) with L MCA stroke s/p mechanical cerebral thrombectomy of L distal M1 occlusion TICI 3 (7/12).      Hospital Course:    7/12: POD0 s/p  L MCA stroke s/p mechanical cerebral thrombectomy of L distal M1 occlusion TICI 0 to 3. Hypotensive, responsive to IVB.  7/13: POD1 overnight  low dose precedex restarted for agitation and then stopped due to SBP below goal. Recieved 250cc bolus 5% albumin for SBP in low 100s. BP drops 100s-110s when sleeping and in 150s when awake overnight.    24 Hour Events/Subjective:  - stroke day 2  - PAD 1  - transient hypotension overnight likel 2/2 volume depeletion/polypharmacy, stable this am  - CTH with SAH vs. contrast extravasation on L sylvian fissure      REVIEW OF SYSTEMS:  - negative except as above    Objective:    T(C): 36.7 (07-13-22 @ 05:39), Max: 36.8 (07-12-22 @ 11:28)  T(F): 98 (07-13-22 @ 05:39), Max: 98.3 (07-12-22 @ 11:28)  HR: 76 (07-13-22 @ 06:00) (61 - 96)  BP: 135/67 (07-13-22 @ 06:00) (78/46 - 179/100)  ABP: --  ABP(mean): --  RR: 16 (07-13-22 @ 06:00) (15 - 24)  SpO2: 99% (07-13-22 @ 06:00) (90% - 100%)                          12.8   7.26  )-----------( 267      ( 13 Jul 2022 05:08 )             37.3     07-13    143  |  111<H>  |  24<H>  ----------------------------<  106<H>  4.1   |  21<L>  |  0.94    Ca    8.5      13 Jul 2022 05:08  Phos  3.3     07-13  Mg     2.2     07-13    TPro  5.6<L>  /  Alb  3.5  /  TBili  0.4  /  DBili  0.2  /  AST  28  /  ALT  22  /  AlkPhos  67  07-12      PT/INR - ( 12 Jul 2022 19:52 )   PT: 12.6 sec;   INR: 1.06          PTT - ( 12 Jul 2022 19:52 )  PTT:26.1 sec          PHYSICAL EXAM:    General: calm  CVS: RRR  Pulm: CTAB  GI: Soft, NTND  Extremities: No LE Edema  Neuro: awake, alert, Ox3 with choice, follow most verbal simple commands, does not follow 2 step commands, receptive and expressive aphasia, FENG x 4 at least antigravity, +RUE pronator drift

## 2022-07-13 NOTE — OCCUPATIONAL THERAPY INITIAL EVALUATION ADULT - ADDITIONAL COMMENTS
History obtained from family as pt only able to answer yes/no questions. Pt lives with his wife and daughter in an elevator access apartment with his wife, 0 SELAM. Pt reports that prior to admission, pt was independent in all ADLs and IADLs, and ambulates with no device. Pt is active and likes to ride his bike. Pt is R hand dominant. History obtained from family as pt only able to answer yes/no questions. Pt lives with his wife and daughter in an elevator access apartment, 0 [address]. Pt reports that prior to admission, pt was independent in all ADLs and IADLs, and ambulates with no device. Per family pt is very active and likes to ride his bike. Pt is R hand dominant.

## 2022-07-13 NOTE — PROGRESS NOTE ADULT - SUBJECTIVE AND OBJECTIVE BOX
Neurology Stroke Progress Note    INTERVAL HPI/OVERNIGHT EVENTS:  Patient seen and examined. s/p thrombectomy of L M1 occlusion TICI 0-3. Exam much improved.     MEDICATIONS  (STANDING):  atorvastatin 80 milliGRAM(s) Oral at bedtime  chlorhexidine 2% Cloths 1 Application(s) Topical <User Schedule>  insulin lispro (ADMELOG) corrective regimen sliding scale   SubCutaneous Before meals and at bedtime  pantoprazole  Injectable 40 milliGRAM(s) IV Push daily  sodium chloride 0.9%. 1000 milliLiter(s) (75 mL/Hr) IV Continuous <Continuous>    MEDICATIONS  (PRN):  acetaminophen     Tablet .. 650 milliGRAM(s) Oral every 6 hours PRN Temp greater or equal to 38C (100.4F), Mild Pain (1 - 3)  ondansetron Injectable 4 milliGRAM(s) IV Push every 6 hours PRN Nausea and/or Vomiting      Allergies    penicillin (Unknown)    Intolerances        Vital Signs Last 24 Hrs  T(C): 36.7 (13 Jul 2022 05:39), Max: 36.8 (12 Jul 2022 11:28)  T(F): 98 (13 Jul 2022 05:39), Max: 98.3 (12 Jul 2022 11:28)  HR: 69 (13 Jul 2022 07:00) (61 - 96)  BP: 159/74 (13 Jul 2022 07:00) (78/46 - 179/100)  BP(mean): 106 (13 Jul 2022 07:00) (58 - 120)  RR: 16 (13 Jul 2022 07:00) (15 - 24)  SpO2: 99% (13 Jul 2022 07:00) (90% - 100%)    Parameters below as of 13 Jul 2022 07:00  Patient On (Oxygen Delivery Method): nasal cannula  O2 Flow (L/min): 2      Physical exam:  Neurologic:  -Mental status: Awake, alert, expressive > receptive aphasia. Nods appropriately to choices. Knows his name, location, time. Follows most commands.   -Cranial nerves:   II: Difficult to assess  III, IV, VI:  Slight L gaze preference but can cross midline.  V:  Facial sensation V1-V3 equal and intact   VII: R facial droop  XII: Tongue protrudes midline  Motor: Normal bulk and tone. RUE/RLE 4/5. LUE/LLE 5/5.   Sensation: Difficult to assess  Coordination: Difficult to assess      LABS:                        12.8   7.26  )-----------( 267      ( 13 Jul 2022 05:08 )             37.3     07-13    143  |  111<H>  |  24<H>  ----------------------------<  106<H>  4.1   |  21<L>  |  0.94    Ca    8.5      13 Jul 2022 05:08  Phos  3.3     07-13  Mg     2.2     07-13    TPro  5.6<L>  /  Alb  3.5  /  TBili  0.4  /  DBili  0.2  /  AST  28  /  ALT  22  /  AlkPhos  67  07-12    PT/INR - ( 12 Jul 2022 19:52 )   PT: 12.6 sec;   INR: 1.06          PTT - ( 12 Jul 2022 19:52 )  PTT:26.1 sec      RADIOLOGY & ADDITIONAL TESTS:  < from: CT Brain Stroke Protocol (07.12.22 @ 11:28) >  IMPRESSION: Hyperdense left MCA which is suspicious for thrombus and   acute left MCA territory infarct.    < end of copied text >    < from: CT Head No Cont (07.12.22 @ 20:27) >  There has been interval appearance of acute subarachnoid hemorrhage   within the posterior lateral sulcus of the sylvian fissure and left   parietal sulci. There is a small amount of edema seen within the left   posterior insula (image #13 through 16 series 2). 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 There is no   evidence of mass-effect or midline shift. There is no evidence of an   intra  -axial fluid collection.    Visualized paranasal sinuses and bilateral mastoid air cells are clear.    Impression: Interval appearance of left sylvian fissure and no lesion   parietal subarachnoid hemorrhage.    < end of copied text >       Neurology Stroke Progress Note    INTERVAL HPI/OVERNIGHT EVENTS:  Patient seen and examined. s/p thrombectomy of L M1 occlusion TICI 0-3. Exam much improved. Overnight, patient was agitated, started precedex gtt, BP dropped to SBP 90s, agitation improved and gtt d/c. Repeat CTH was done which shows SAH vs contrast staining. Pending scan this AM.    MEDICATIONS  (STANDING):  atorvastatin 80 milliGRAM(s) Oral at bedtime  chlorhexidine 2% Cloths 1 Application(s) Topical <User Schedule>  insulin lispro (ADMELOG) corrective regimen sliding scale   SubCutaneous Before meals and at bedtime  pantoprazole  Injectable 40 milliGRAM(s) IV Push daily  sodium chloride 0.9%. 1000 milliLiter(s) (75 mL/Hr) IV Continuous <Continuous>    MEDICATIONS  (PRN):  acetaminophen     Tablet .. 650 milliGRAM(s) Oral every 6 hours PRN Temp greater or equal to 38C (100.4F), Mild Pain (1 - 3)  ondansetron Injectable 4 milliGRAM(s) IV Push every 6 hours PRN Nausea and/or Vomiting      Allergies    penicillin (Unknown)    Intolerances        Vital Signs Last 24 Hrs  T(C): 36.7 (13 Jul 2022 05:39), Max: 36.8 (12 Jul 2022 11:28)  T(F): 98 (13 Jul 2022 05:39), Max: 98.3 (12 Jul 2022 11:28)  HR: 69 (13 Jul 2022 07:00) (61 - 96)  BP: 159/74 (13 Jul 2022 07:00) (78/46 - 179/100)  BP(mean): 106 (13 Jul 2022 07:00) (58 - 120)  RR: 16 (13 Jul 2022 07:00) (15 - 24)  SpO2: 99% (13 Jul 2022 07:00) (90% - 100%)    Parameters below as of 13 Jul 2022 07:00  Patient On (Oxygen Delivery Method): nasal cannula  O2 Flow (L/min): 2      Physical exam:  Neurologic:  -Mental status: Awake, alert, expressive > receptive aphasia. Nods appropriately to choices. Knows his name, location, time. Follows most commands.   -Cranial nerves:   II: Difficult to assess  III, IV, VI:  Slight L gaze preference but can cross midline.  V:  Facial sensation V1-V3 equal and intact   VII: R facial droop  XII: Tongue protrudes midline  Motor: Normal bulk and tone. RUE/RLE 4/5. LUE/LLE 5/5.   Sensation: Difficult to assess  Coordination: Difficult to assess      LABS:                        12.8   7.26  )-----------( 267      ( 13 Jul 2022 05:08 )             37.3     07-13    143  |  111<H>  |  24<H>  ----------------------------<  106<H>  4.1   |  21<L>  |  0.94    Ca    8.5      13 Jul 2022 05:08  Phos  3.3     07-13  Mg     2.2     07-13    TPro  5.6<L>  /  Alb  3.5  /  TBili  0.4  /  DBili  0.2  /  AST  28  /  ALT  22  /  AlkPhos  67  07-12    PT/INR - ( 12 Jul 2022 19:52 )   PT: 12.6 sec;   INR: 1.06          PTT - ( 12 Jul 2022 19:52 )  PTT:26.1 sec      RADIOLOGY & ADDITIONAL TESTS:  < from: CT Brain Stroke Protocol (07.12.22 @ 11:28) >  IMPRESSION: Hyperdense left MCA which is suspicious for thrombus and   acute left MCA territory infarct.    < end of copied text >    < from: CT Head No Cont (07.12.22 @ 20:27) >  There has been interval appearance of acute subarachnoid hemorrhage   within the posterior lateral sulcus of the sylvian fissure and left   parietal sulci. There is a small amount of edema seen within the left   posterior insula (image #13 through 16 series 2). 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 There is no   evidence of mass-effect or midline shift. There is no evidence of an   intra  -axial fluid collection.    Visualized paranasal sinuses and bilateral mastoid air cells are clear.    Impression: Interval appearance of left sylvian fissure and no lesion   parietal subarachnoid hemorrhage.    < end of copied text >       Neurology Stroke Progress Note    INTERVAL HPI/OVERNIGHT EVENTS:  Patient seen and examined. s/p thrombectomy of L M1 occlusion TICI 0-3. Exam much improved. Overnight, patient was agitated, started precedex gtt, SBP dropped to 90s, agitation improved and gtt d/c. Repeat CTH was done which shows SAH vs contrast staining. Repeat scan this AM pending official read.     MEDICATIONS  (STANDING):  atorvastatin 80 milliGRAM(s) Oral at bedtime  chlorhexidine 2% Cloths 1 Application(s) Topical <User Schedule>  insulin lispro (ADMELOG) corrective regimen sliding scale   SubCutaneous Before meals and at bedtime  pantoprazole  Injectable 40 milliGRAM(s) IV Push daily  sodium chloride 0.9%. 1000 milliLiter(s) (75 mL/Hr) IV Continuous <Continuous>    MEDICATIONS  (PRN):  acetaminophen     Tablet .. 650 milliGRAM(s) Oral every 6 hours PRN Temp greater or equal to 38C (100.4F), Mild Pain (1 - 3)  ondansetron Injectable 4 milliGRAM(s) IV Push every 6 hours PRN Nausea and/or Vomiting      Allergies    penicillin (Unknown)    Intolerances        Vital Signs Last 24 Hrs  T(C): 36.7 (13 Jul 2022 05:39), Max: 36.8 (12 Jul 2022 11:28)  T(F): 98 (13 Jul 2022 05:39), Max: 98.3 (12 Jul 2022 11:28)  HR: 69 (13 Jul 2022 07:00) (61 - 96)  BP: 159/74 (13 Jul 2022 07:00) (78/46 - 179/100)  BP(mean): 106 (13 Jul 2022 07:00) (58 - 120)  RR: 16 (13 Jul 2022 07:00) (15 - 24)  SpO2: 99% (13 Jul 2022 07:00) (90% - 100%)    Parameters below as of 13 Jul 2022 07:00  Patient On (Oxygen Delivery Method): nasal cannula  O2 Flow (L/min): 2      Physical exam:  Neurologic:  -Mental status: Awake, alert, expressive > receptive aphasia. Nods appropriately to choices. Knows his name, location, time. Follows most commands.   -Cranial nerves:   II: Difficult to assess  III, IV, VI:  Slight L gaze preference but can cross midline.  V:  Facial sensation V1-V3 equal and intact   VII: R facial droop  XII: Tongue protrudes midline  Motor: Normal bulk and tone. RUE/RLE 4/5. LUE/LLE 5/5.   Sensation: Intact to light touch bilaterally. No extinction.  Coordination: Difficult to assess      LABS:                        12.8   7.26  )-----------( 267      ( 13 Jul 2022 05:08 )             37.3     07-13    143  |  111<H>  |  24<H>  ----------------------------<  106<H>  4.1   |  21<L>  |  0.94    Ca    8.5      13 Jul 2022 05:08  Phos  3.3     07-13  Mg     2.2     07-13    TPro  5.6<L>  /  Alb  3.5  /  TBili  0.4  /  DBili  0.2  /  AST  28  /  ALT  22  /  AlkPhos  67  07-12    PT/INR - ( 12 Jul 2022 19:52 )   PT: 12.6 sec;   INR: 1.06          PTT - ( 12 Jul 2022 19:52 )  PTT:26.1 sec      RADIOLOGY & ADDITIONAL TESTS:  < from: CT Brain Stroke Protocol (07.12.22 @ 11:28) >  IMPRESSION: Hyperdense left MCA which is suspicious for thrombus and   acute left MCA territory infarct.    < end of copied text >    < from: CT Head No Cont (07.12.22 @ 20:27) >  There has been interval appearance of acute subarachnoid hemorrhage   within the posterior lateral sulcus of the sylvian fissure and left   parietal sulci. There is a small amount of edema seen within the left   posterior insula (image #13 through 16 series 2). 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 There is no   evidence of mass-effect or midline shift. There is no evidence of an   intra  -axial fluid collection.    Visualized paranasal sinuses and bilateral mastoid air cells are clear.    Impression: Interval appearance of left sylvian fissure and no lesion   parietal subarachnoid hemorrhage.    < end of copied text >       Neurology Stroke Progress Note    STEP DOWN FROM [facility] TO [facility] Hospital course: 76y Male with PMHx of Afib (on Xarelto), CVD (mid LAD, mid RCA, OM1, OM2, OM3 stents [id number]), HTN, HLD, GERD, and COVID infection 7/8/22 (tx w/ Paxlovid) presents to ED by EMS, found to be a thrombectomy case with L gaze, aphasia, R sided plegia. CTH showed hyperdense L MCA sign, confirmed on CTA. CTP showed significant mismatch. Patient was not a tpa candidate as he had likely taken Xeralto within 3 hours prior to arrival, was on Paxlovid + Xeralto which would elevate bleeding risk regardless. Patient deemed appropriate candidate for thrombectomy and transferred to IR suite for intervention- TICI3 achieved. Of note, L ICA dissection (non-occlusive) was noted - unclear if cause of stroke or iatrogenic- will need f/u. Exam improving. CTH with SAH vs. contrast extravasation on L sylvian fissure. Holding home xarelto for now. Pending dysphagia screen. Stable for step down to [facility].    INTERVAL HPI/OVERNIGHT EVENTS:  Patient seen and examined. s/p thrombectomy of L M1 occlusion TICI 0-3. Exam much improved. Overnight, patient was agitated, started precedex gtt, SBP dropped to 90s, agitation improved and gtt d/c. Repeat CTH was done which shows SAH vs contrast staining. Repeat scan this AM pending official read.     MEDICATIONS  (STANDING):  atorvastatin 80 milliGRAM(s) Oral at bedtime  chlorhexidine 2% Cloths 1 Application(s) Topical <User Schedule>  insulin lispro (ADMELOG) corrective regimen sliding scale   SubCutaneous Before meals and at bedtime  pantoprazole  Injectable 40 milliGRAM(s) IV Push daily  sodium chloride 0.9%. 1000 milliLiter(s) (75 mL/Hr) IV Continuous <Continuous>    MEDICATIONS  (PRN):  acetaminophen     Tablet .. 650 milliGRAM(s) Oral every 6 hours PRN Temp greater or equal to 38C (100.4F), Mild Pain (1 - 3)  ondansetron Injectable 4 milliGRAM(s) IV Push every 6 hours PRN Nausea and/or Vomiting      Allergies    penicillin (Unknown)    Intolerances        Vital Signs Last 24 Hrs  T(C): 36.7 (13 Jul 2022 05:39), Max: 36.8 (12 Jul 2022 11:28)  T(F): 98 (13 Jul 2022 05:39), Max: 98.3 (12 Jul 2022 11:28)  HR: 69 (13 Jul 2022 07:00) (61 - 96)  BP: 159/74 (13 Jul 2022 07:00) (78/46 - 179/100)  BP(mean): 106 (13 Jul 2022 07:00) (58 - 120)  RR: 16 (13 Jul 2022 07:00) (15 - 24)  SpO2: 99% (13 Jul 2022 07:00) (90% - 100%)    Parameters below as of 13 Jul 2022 07:00  Patient On (Oxygen Delivery Method): nasal cannula  O2 Flow (L/min): 2      Physical exam:  Neurologic:  -Mental status: Awake, alert, expressive > receptive aphasia. Nods appropriately to choices. Knows his name, location, time. Follows most simple commands. Does not follow 2 step commands  -Cranial nerves:   II: Difficult to assess  III, IV, VI:  Slight L gaze preference but can cross midline.  V:  Facial sensation V1-V3 equal and intact   VII: R facial droop  XII: Tongue protrudes midline  Motor: Normal bulk and tone. RUE/RLE 4/5. LUE/LLE 5/5.   Sensation: Intact to light touch bilaterally. No extinction.  Coordination: Difficult to assess      LABS:                        12.8   7.26  )-----------( 267      ( 13 Jul 2022 05:08 )             37.3     07-13    143  |  111<H>  |  24<H>  ----------------------------<  106<H>  4.1   |  21<L>  |  0.94    Ca    8.5      13 Jul 2022 05:08  Phos  3.3     07-13  Mg     2.2     07-13    TPro  5.6<L>  /  Alb  3.5  /  TBili  0.4  /  DBili  0.2  /  AST  28  /  ALT  22  /  AlkPhos  67  07-12    PT/INR - ( 12 Jul 2022 19:52 )   PT: 12.6 sec;   INR: 1.06          PTT - ( 12 Jul 2022 19:52 )  PTT:26.1 sec      RADIOLOGY & ADDITIONAL TESTS:  < from: CT Brain Stroke Protocol (07.12.22 @ 11:28) >  IMPRESSION: Hyperdense left MCA which is suspicious for thrombus and   acute left MCA territory infarct.    < end of copied text >    < from: CT Head No Cont (07.12.22 @ 20:27) >  There has been interval appearance of acute subarachnoid hemorrhage   within the posterior lateral sulcus of the sylvian fissure and left   parietal sulci. There is a small amount of edema seen within the left   posterior insula (image #13 through 16 series 2). 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 There is no   evidence of mass-effect or midline shift. There is no evidence of an   intra  -axial fluid collection.    Visualized paranasal sinuses and bilateral mastoid air cells are clear.    Impression: Interval appearance of left sylvian fissure and no lesion   parietal subarachnoid hemorrhage.    < end of copied text >         Neurology Stroke Progress Note    STEP DOWN FROM [facility] TO [facility] Hospital course: 76y Male with PMHx of Afib (on Xarelto), CVD (mid LAD, mid RCA, OM1, OM2, OM3 stents [id number]), HTN, HLD, GERD, and COVID infection 7/8/22 (tx w/ Paxlovid) presents to ED by EMS, found to be a thrombectomy case with L gaze, aphasia, R sided plegia. CTH showed hyperdense L MCA sign, confirmed on CTA. CTP showed significant mismatch. Patient was not a tpa candidate as he had likely taken Xeralto within 3 hours prior to arrival, was on Paxlovid + Xeralto which would elevate bleeding risk regardless. Patient deemed appropriate candidate for thrombectomy and transferred to IR suite for intervention- TICI3 achieved. Of note, L ICA dissection (non-occlusive) was noted - unclear if cause of stroke or iatrogenic- will need f/u. Exam improving. CTH with SAH vs. contrast extravasation on L sylvian fissure. Holding home xarelto for now. Stable for step down to [facility].    INTERVAL HPI/OVERNIGHT EVENTS:  Patient seen and examined. s/p thrombectomy of L M1 occlusion TICI 0-3. Exam much improved. Overnight, patient was agitated, started precedex gtt, SBP dropped to 90s, agitation improved and gtt d/c. Repeat CTH was done which shows SAH vs contrast staining. Repeat scan this AM pending official read.     MEDICATIONS  (STANDING):  atorvastatin 80 milliGRAM(s) Oral at bedtime  chlorhexidine 2% Cloths 1 Application(s) Topical <User Schedule>  insulin lispro (ADMELOG) corrective regimen sliding scale   SubCutaneous Before meals and at bedtime  pantoprazole  Injectable 40 milliGRAM(s) IV Push daily  sodium chloride 0.9%. 1000 milliLiter(s) (75 mL/Hr) IV Continuous <Continuous>    MEDICATIONS  (PRN):  acetaminophen     Tablet .. 650 milliGRAM(s) Oral every 6 hours PRN Temp greater or equal to 38C (100.4F), Mild Pain (1 - 3)  ondansetron Injectable 4 milliGRAM(s) IV Push every 6 hours PRN Nausea and/or Vomiting      Allergies    penicillin (Unknown)    Intolerances        Vital Signs Last 24 Hrs  T(C): 36.7 (13 Jul 2022 05:39), Max: 36.8 (12 Jul 2022 11:28)  T(F): 98 (13 Jul 2022 05:39), Max: 98.3 (12 Jul 2022 11:28)  HR: 69 (13 Jul 2022 07:00) (61 - 96)  BP: 159/74 (13 Jul 2022 07:00) (78/46 - 179/100)  BP(mean): 106 (13 Jul 2022 07:00) (58 - 120)  RR: 16 (13 Jul 2022 07:00) (15 - 24)  SpO2: 99% (13 Jul 2022 07:00) (90% - 100%)    Parameters below as of 13 Jul 2022 07:00  Patient On (Oxygen Delivery Method): nasal cannula  O2 Flow (L/min): 2      Physical exam:  Neurologic:  -Mental status: Awake, alert, expressive > receptive aphasia. Nods appropriately to choices. Knows his name, location, time. Follows most simple commands. Does not follow 2 step commands  -Cranial nerves:   II: Difficult to assess  III, IV, VI:  Slight L gaze preference but can cross midline.  V:  Facial sensation V1-V3 equal and intact   VII: R facial droop  XII: Tongue protrudes midline  Motor: Normal bulk and tone. RUE/RLE 4/5. LUE/LLE 5/5.   Sensation: Intact to light touch bilaterally. No extinction.  Coordination: Difficult to assess      LABS:                        12.8   7.26  )-----------( 267      ( 13 Jul 2022 05:08 )             37.3     07-13    143  |  111<H>  |  24<H>  ----------------------------<  106<H>  4.1   |  21<L>  |  0.94    Ca    8.5      13 Jul 2022 05:08  Phos  3.3     07-13  Mg     2.2     07-13    TPro  5.6<L>  /  Alb  3.5  /  TBili  0.4  /  DBili  0.2  /  AST  28  /  ALT  22  /  AlkPhos  67  07-12    PT/INR - ( 12 Jul 2022 19:52 )   PT: 12.6 sec;   INR: 1.06          PTT - ( 12 Jul 2022 19:52 )  PTT:26.1 sec      RADIOLOGY & ADDITIONAL TESTS:  < from: CT Brain Stroke Protocol (07.12.22 @ 11:28) >  IMPRESSION: Hyperdense left MCA which is suspicious for thrombus and   acute left MCA territory infarct.    < end of copied text >    < from: CT Head No Cont (07.12.22 @ 20:27) >  There has been interval appearance of acute subarachnoid hemorrhage   within the posterior lateral sulcus of the sylvian fissure and left   parietal sulci. There is a small amount of edema seen within the left   posterior insula (image #13 through 16 series 2). 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 There is no   evidence of mass-effect or midline shift. There is no evidence of an   intra  -axial fluid collection.    Visualized paranasal sinuses and bilateral mastoid air cells are clear.    Impression: Interval appearance of left sylvian fissure and no lesion   parietal subarachnoid hemorrhage.    < end of copied text >         Neurology Stroke Progress Note    STEP DOWN FROM [facility] TO [facility] Hospital course: 76y Male with PMHx of Afib (on Xarelto), CVD (mid LAD, mid RCA, OM1, OM2, OM3 stents [id number]), HTN, HLD, GERD, and COVID infection 7/8/22 (tx w/ Paxlovid) presents to ED by EMS, found to be a thrombectomy case with L gaze, aphasia, R sided plegia. CTH showed hyperdense L MCA sign, confirmed on CTA. CTP showed significant mismatch. Patient was not a tpa candidate as he had likely taken Xeralto within 3 hours prior to arrival, was on Paxlovid + Xeralto which would elevate bleeding risk regardless. Patient deemed appropriate candidate for thrombectomy and transferred to IR suite for intervention- TICI3 achieved. Of note, L ICA dissection (non-occlusive) was noted - unclear if cause of stroke or iatrogenic- will need f/u. Exam improving. CTH with SAH vs. contrast extravasation on L sylvian fissure. Holding home xarelto for now. Stable for step down to [facility].    INTERVAL HPI/OVERNIGHT EVENTS:  Patient seen and examined. s/p thrombectomy of L M1 occlusion TICI 0-3. Exam much improved. Overnight, patient was agitated, started precedex gtt, SBP dropped to 90s, agitation improved and gtt d/c. Repeat CTH was done which shows SAH vs contrast staining. Repeat scan this AM pending official read. Likely contrast.    MEDICATIONS  (STANDING):  atorvastatin 80 milliGRAM(s) Oral at bedtime  chlorhexidine 2% Cloths 1 Application(s) Topical <User Schedule>  insulin lispro (ADMELOG) corrective regimen sliding scale   SubCutaneous Before meals and at bedtime  pantoprazole  Injectable 40 milliGRAM(s) IV Push daily  sodium chloride 0.9%. 1000 milliLiter(s) (75 mL/Hr) IV Continuous <Continuous>    MEDICATIONS  (PRN):  acetaminophen     Tablet .. 650 milliGRAM(s) Oral every 6 hours PRN Temp greater or equal to 38C (100.4F), Mild Pain (1 - 3)  ondansetron Injectable 4 milliGRAM(s) IV Push every 6 hours PRN Nausea and/or Vomiting      Allergies    penicillin (Unknown)    Intolerances        Vital Signs Last 24 Hrs  T(C): 36.7 (13 Jul 2022 05:39), Max: 36.8 (12 Jul 2022 11:28)  T(F): 98 (13 Jul 2022 05:39), Max: 98.3 (12 Jul 2022 11:28)  HR: 69 (13 Jul 2022 07:00) (61 - 96)  BP: 159/74 (13 Jul 2022 07:00) (78/46 - 179/100)  BP(mean): 106 (13 Jul 2022 07:00) (58 - 120)  RR: 16 (13 Jul 2022 07:00) (15 - 24)  SpO2: 99% (13 Jul 2022 07:00) (90% - 100%)    Parameters below as of 13 Jul 2022 07:00  Patient On (Oxygen Delivery Method): nasal cannula  O2 Flow (L/min): 2      Physical exam:  Neurologic:  -Mental status: Awake, alert, expressive > receptive aphasia. Nods appropriately to choices. Knows his name, location, time. Follows most simple commands. Does not follow 2 step commands  -Cranial nerves:   II: Difficult to assess  III, IV, VI:  Slight L gaze preference but can cross midline.  V:  Facial sensation V1-V3 equal and intact   VII: R facial droop  XII: Tongue protrudes midline  Motor: Normal bulk and tone. RUE/RLE 4/5. LUE/LLE 5/5.   Sensation: Intact to light touch bilaterally. No extinction.  Coordination: Difficult to assess      LABS:                        12.8   7.26  )-----------( 267      ( 13 Jul 2022 05:08 )             37.3     07-13    143  |  111<H>  |  24<H>  ----------------------------<  106<H>  4.1   |  21<L>  |  0.94    Ca    8.5      13 Jul 2022 05:08  Phos  3.3     07-13  Mg     2.2     07-13    TPro  5.6<L>  /  Alb  3.5  /  TBili  0.4  /  DBili  0.2  /  AST  28  /  ALT  22  /  AlkPhos  67  07-12    PT/INR - ( 12 Jul 2022 19:52 )   PT: 12.6 sec;   INR: 1.06          PTT - ( 12 Jul 2022 19:52 )  PTT:26.1 sec      RADIOLOGY & ADDITIONAL TESTS:  < from: CT Brain Stroke Protocol (07.12.22 @ 11:28) >  IMPRESSION: Hyperdense left MCA which is suspicious for thrombus and   acute left MCA territory infarct.    < end of copied text >    < from: CT Head No Cont (07.12.22 @ 20:27) >  There has been interval appearance of acute subarachnoid hemorrhage   within the posterior lateral sulcus of the sylvian fissure and left   parietal sulci. There is a small amount of edema seen within the left   posterior insula (image #13 through 16 series 2). 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 There is no   evidence of mass-effect or midline shift. There is no evidence of an   intra  -axial fluid collection.    Visualized paranasal sinuses and bilateral mastoid air cells are clear.    Impression: Interval appearance of left sylvian fissure and no lesion   parietal subarachnoid hemorrhage.    < end of copied text >         Neurology Stroke Progress Note    STEP DOWN FROM [facility] TO [facility] Hospital course: 76y Male with PMHx of Afib (on Xarelto), CVD (mid LAD, mid RCA, OM1, OM2, OM3 stents [id number]), HTN, HLD, GERD, and COVID infection 7/8/22 (tx w/ Paxlovid) presents to ED by EMS, found to be a thrombectomy case with L gaze, aphasia, R sided plegia. CTH showed hyperdense L MCA sign, confirmed on CTA. CTP showed significant mismatch. Patient was not a tpa candidate as he had likely taken Xeralto within 3 hours prior to arrival, was on Paxlovid + Xeralto which would elevate bleeding risk regardless. Patient deemed appropriate candidate for thrombectomy and transferred to IR suite for intervention- TICI3 achieved. Of note, L ICA dissection (non-occlusive) was noted - unclear if cause of stroke or iatrogenic- will need f/u. Exam improving. CTH with SAH vs. contrast extravasation on L sylvian fissure. Holding home xarelto for now. Stable for step down to 7 [facility] for continuation of stroke workup.    INTERVAL HPI/OVERNIGHT EVENTS:  Patient seen and examined. s/p thrombectomy of L M1 occlusion TICI 0-3. Exam much improved. Overnight, patient was agitated, started precedex gtt, SBP dropped to 90s, agitation improved and gtt d/c. Repeat CTH was done which shows SAH vs contrast staining. Repeat scan this AM pending official read. Likely contrast.    MEDICATIONS  (STANDING):  atorvastatin 80 milliGRAM(s) Oral at bedtime  chlorhexidine 2% Cloths 1 Application(s) Topical <User Schedule>  insulin lispro (ADMELOG) corrective regimen sliding scale   SubCutaneous Before meals and at bedtime  pantoprazole  Injectable 40 milliGRAM(s) IV Push daily  sodium chloride 0.9%. 1000 milliLiter(s) (75 mL/Hr) IV Continuous <Continuous>    MEDICATIONS  (PRN):  acetaminophen     Tablet .. 650 milliGRAM(s) Oral every 6 hours PRN Temp greater or equal to 38C (100.4F), Mild Pain (1 - 3)  ondansetron Injectable 4 milliGRAM(s) IV Push every 6 hours PRN Nausea and/or Vomiting      Allergies    penicillin (Unknown)    Intolerances        Vital Signs Last 24 Hrs  T(C): 36.7 (13 Jul 2022 05:39), Max: 36.8 (12 Jul 2022 11:28)  T(F): 98 (13 Jul 2022 05:39), Max: 98.3 (12 Jul 2022 11:28)  HR: 69 (13 Jul 2022 07:00) (61 - 96)  BP: 159/74 (13 Jul 2022 07:00) (78/46 - 179/100)  BP(mean): 106 (13 Jul 2022 07:00) (58 - 120)  RR: 16 (13 Jul 2022 07:00) (15 - 24)  SpO2: 99% (13 Jul 2022 07:00) (90% - 100%)    Parameters below as of 13 Jul 2022 07:00  Patient On (Oxygen Delivery Method): nasal cannula  O2 Flow (L/min): 2      Physical exam:  Neurologic:  -Mental status: Awake, alert, expressive > receptive aphasia. Nods appropriately to choices. Knows his name, location, time. Follows most simple commands. Does not follow 2 step commands  -Cranial nerves:   II: Difficult to assess  III, IV, VI:  Slight L gaze preference but can cross midline.  V:  Facial sensation V1-V3 equal and intact   VII: R facial droop  XII: Tongue protrudes midline  Motor: Normal bulk and tone. RUE/RLE 4/5. LUE/LLE 5/5.   Sensation: Intact to light touch bilaterally. No extinction.  Coordination: Difficult to assess      LABS:                        12.8   7.26  )-----------( 267      ( 13 Jul 2022 05:08 )             37.3     07-13    143  |  111<H>  |  24<H>  ----------------------------<  106<H>  4.1   |  21<L>  |  0.94    Ca    8.5      13 Jul 2022 05:08  Phos  3.3     07-13  Mg     2.2     07-13    TPro  5.6<L>  /  Alb  3.5  /  TBili  0.4  /  DBili  0.2  /  AST  28  /  ALT  22  /  AlkPhos  67  07-12    PT/INR - ( 12 Jul 2022 19:52 )   PT: 12.6 sec;   INR: 1.06          PTT - ( 12 Jul 2022 19:52 )  PTT:26.1 sec      RADIOLOGY & ADDITIONAL TESTS:  < from: CT Brain Stroke Protocol (07.12.22 @ 11:28) >  IMPRESSION: Hyperdense left MCA which is suspicious for thrombus and   acute left MCA territory infarct.    < end of copied text >    < from: CT Head No Cont (07.12.22 @ 20:27) >  There has been interval appearance of acute subarachnoid hemorrhage   within the posterior lateral sulcus of the sylvian fissure and left   parietal sulci. There is a small amount of edema seen within the left   posterior insula (image #13 through 16 series 2). 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 There is no   evidence of mass-effect or midline shift. There is no evidence of an   intra  -axial fluid collection.    Visualized paranasal sinuses and bilateral mastoid air cells are clear.    Impression: Interval appearance of left sylvian fissure and no lesion   parietal subarachnoid hemorrhage.    < end of copied text >         Neurology Stroke Progress Note    STEP DOWN FROM [facility] TO [facility] Hospital course: 76y Male with PMHx of Afib (on Xarelto), CVD (mid LAD, mid RCA, OM1, OM2, OM3 stents [id number]), HTN, HLD, GERD, and COVID infection 7/8/22 (tx w/ Paxlovid) presents to ED by EMS, found to be a thrombectomy case with L gaze, aphasia, R sided plegia. CTH showed hyperdense L MCA sign, confirmed on CTA. CTP showed significant mismatch. Patient was not a tpa candidate as he had likely taken Xeralto within 3 hours prior to arrival, was on Paxlovid + Xeralto which would elevate bleeding risk regardless. Patient deemed appropriate candidate for thrombectomy and transferred to IR suite for intervention- TICI3 achieved. Of note, L ICA dissection (non-occlusive) was noted - unclear if cause of stroke or iatrogenic- will need f/u. Exam improving. CTH with SAH vs. contrast extravasation on L sylvian fissure. Holding home xarelto for now. Stable for step down to 7 [facility] for continuation of stroke workup.    INTERVAL HPI/OVERNIGHT EVENTS:  Patient seen and examined. s/p thrombectomy of L M1 occlusion TICI 0-3. Exam much improved. Overnight, patient was agitated, started precedex gtt, SBP dropped to 90s, agitation improved and gtt d/c. Repeat CTH was done which shows SAH vs contrast staining.     MEDICATIONS  (STANDING):  atorvastatin 80 milliGRAM(s) Oral at bedtime  chlorhexidine 2% Cloths 1 Application(s) Topical <User Schedule>  insulin lispro (ADMELOG) corrective regimen sliding scale   SubCutaneous Before meals and at bedtime  pantoprazole  Injectable 40 milliGRAM(s) IV Push daily  sodium chloride 0.9%. 1000 milliLiter(s) (75 mL/Hr) IV Continuous <Continuous>    MEDICATIONS  (PRN):  acetaminophen     Tablet .. 650 milliGRAM(s) Oral every 6 hours PRN Temp greater or equal to 38C (100.4F), Mild Pain (1 - 3)  ondansetron Injectable 4 milliGRAM(s) IV Push every 6 hours PRN Nausea and/or Vomiting      Allergies    penicillin (Unknown)    Intolerances        Vital Signs Last 24 Hrs  T(C): 36.7 (13 Jul 2022 05:39), Max: 36.8 (12 Jul 2022 11:28)  T(F): 98 (13 Jul 2022 05:39), Max: 98.3 (12 Jul 2022 11:28)  HR: 69 (13 Jul 2022 07:00) (61 - 96)  BP: 159/74 (13 Jul 2022 07:00) (78/46 - 179/100)  BP(mean): 106 (13 Jul 2022 07:00) (58 - 120)  RR: 16 (13 Jul 2022 07:00) (15 - 24)  SpO2: 99% (13 Jul 2022 07:00) (90% - 100%)    Parameters below as of 13 Jul 2022 07:00  Patient On (Oxygen Delivery Method): nasal cannula  O2 Flow (L/min): 2      Physical exam:  Neurologic:  -Mental status: Awake, alert, expressive > receptive aphasia. Nods appropriately to choices. Knows his name, location, time. Follows most simple commands. Does not follow 2 step commands  -Cranial nerves:   II: Difficult to assess  III, IV, VI:  Slight L gaze preference but can cross midline.  V:  Facial sensation V1-V3 equal and intact   VII: R facial droop  XII: Tongue protrudes midline  Motor: Normal bulk and tone. RUE/RLE 4/5. LUE/LLE 5/5.   Sensation: Intact to light touch bilaterally. No extinction.  Coordination: Difficult to assess      LABS:                        12.8   7.26  )-----------( 267      ( 13 Jul 2022 05:08 )             37.3     07-13    143  |  111<H>  |  24<H>  ----------------------------<  106<H>  4.1   |  21<L>  |  0.94    Ca    8.5      13 Jul 2022 05:08  Phos  3.3     07-13  Mg     2.2     07-13    TPro  5.6<L>  /  Alb  3.5  /  TBili  0.4  /  DBili  0.2  /  AST  28  /  ALT  22  /  AlkPhos  67  07-12    PT/INR - ( 12 Jul 2022 19:52 )   PT: 12.6 sec;   INR: 1.06          PTT - ( 12 Jul 2022 19:52 )  PTT:26.1 sec      RADIOLOGY & ADDITIONAL TESTS:  < from: CT Brain Stroke Protocol (07.12.22 @ 11:28) >  IMPRESSION: Hyperdense left MCA which is suspicious for thrombus and   acute left MCA territory infarct.    < end of copied text >    < from: CT Head No Cont (07.12.22 @ 20:27) >  There has been interval appearance of acute subarachnoid hemorrhage   within the posterior lateral sulcus of the sylvian fissure and left   parietal sulci. There is a small amount of edema seen within the left   posterior insula (image #13 through 16 series 2). 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 There is no   evidence of mass-effect or midline shift. There is no evidence of an   intra  -axial fluid collection.    Visualized paranasal sinuses and bilateral mastoid air cells are clear.    Impression: Interval appearance of left sylvian fissure and no lesion   parietal subarachnoid hemorrhage.    < end of copied text >    < from: CT Head No Cont (07.13.22 @ 11:30) >  IMPRESSION: Minimal decrease in subarachnoid hemorrhage in the left   superior fissure and sulci of the left parietal lobe. No new hemorrhage   is identified.    < end of copied text >

## 2022-07-13 NOTE — PHYSICAL THERAPY INITIAL EVALUATION ADULT - PERTINENT HX OF CURRENT PROBLEM, REHAB EVAL
76y Male with PMHx of afib (on xarelto), HTN, HLD, recent COVID infection (diagnosed 7/8/22, on Paxlovid) with L MCA stroke s/p mechanical cerebral thrombectomy of L distal M1 occlusion TICI 0 to 3 (7/12).

## 2022-07-13 NOTE — PHYSICAL THERAPY INITIAL EVALUATION ADULT - ADDITIONAL COMMENTS
Pt with difficulty speaking 2/2 expressive aphasia, able to communicate via yes/no. Further PLOF obtained from family. Pt reports living with family, in apartment, without SELAM, with elevator access. Reports being independent with daily activities, navigating community by biking and walking.

## 2022-07-13 NOTE — OCCUPATIONAL THERAPY INITIAL EVALUATION ADULT - MODIFIED CLINICAL TEST OF SENSORY INTEGRATION IN BALANCE TEST
Pt ambulated ~2 steps forwards/backwards and 3 sidesteps with R HHA and CGA x 2 (for safety). Pt ambulated ~2 steps forwards/backwards and 3 sidesteps with R HHA and CGA x 2.

## 2022-07-13 NOTE — PHYSICAL THERAPY INITIAL EVALUATION ADULT - THERAPY FREQUENCY, PT EVAL
Patient educated on frequency of inpatient physical therapy at [facility], patient verbalized understanding./3-5x/week

## 2022-07-13 NOTE — PHYSICAL THERAPY INITIAL EVALUATION ADULT - SENSORY TESTS
R hand dominant; (L) hand  5/5, (R) hand  4+/5. CN Testing: B/L Frontalis intact; B/L buccinator intact; smile R facial droop; tongue protrusion at midline; B/L eyes open/close intact; Shoulder elevation: intact bilaterally; Vision H-Test: bilateral tracking and smooth pursuit generally impaired, losing track of finger, most pronounced during upper fields; Convergence/Divergence: intact; Vision Quadrant Test: intact bilaterally

## 2022-07-13 NOTE — PHYSICAL THERAPY INITIAL EVALUATION ADULT - ORIENTATION, REHAB EVAL
pt with expressive aphasia, able to communicate via yes/no, able to form some simple sentences, has word finding difficulties/person/place/situation

## 2022-07-13 NOTE — DIETITIAN INITIAL EVALUATION ADULT - PERTINENT LABORATORY DATA
07-13    143  |  111<H>  |  24<H>  ----------------------------<  106<H>  4.1   |  21<L>  |  0.94    Ca    8.5      13 Jul 2022 05:08  Phos  3.3     07-13  Mg     2.2     07-13    TPro  5.6<L>  /  Alb  3.5  /  TBili  0.4  /  DBili  0.2  /  AST  28  /  ALT  22  /  AlkPhos  67  07-12  POCT Blood Glucose.: 116 mg/dL (07-13-22 @ 17:23)  A1C with Estimated Average Glucose Result: 5.6 % (07-13-22 @ 05:08)

## 2022-07-13 NOTE — PROGRESS NOTE ADULT - SUBJECTIVE AND OBJECTIVE BOX
HPI:  75 y/o male non-smoker w/ pmHx of Afib (on Xarelto), CVD (mid LAD, mid RCA, OM1, OM2, OM3 stents [id number]), HTN, HLD, GERD, and COVID infection 7/8/22 (tx w/ Paxlovid) presented to ED for unwitnessed fall around 10:15am 7/12/22. Patient's spouse states she heard a bang in a separate room at the house and found her  on the carpet. Patient was conscious, facedown, non-verbal, and moving only one of his arms (spouse could not recall right or left). No head trauma, LOC, or seizure activity was witnessed. This has never happened before.      Upon arrival to ED, patient had L gaze, aphasic, R sided plegia. CTH with hyperdense left MCA which was suspicious for thrombus and acute left MCA territory infarct. CTA with occlusion of the midportion of the left M1 segment to the proximal M2 segment. CTP with abnormal perfusion left MCA mismatch. BP in ED 170s/100.    Of note, wife reports they just got back from a 3 week trip from [facility] and were on a 12 hour flight on 7/7. Patient tested positive for COVID on 7/8 and was prescribed Paxlovid. Wife states patient usually takes his xarelto and other medications every morning around 9 or 10am, is unsure if he took them today. Patient is receiving care with cardiologist Dr. Brett Raya of [place] ([phone]). Patient is s/p mid LAD stent (April 2019), mid RCA stent (2016), and OM1-OM3 stents (April 2014).     Decision was made to take patient to thrombectomy with verbal consent from spouse. Patient was urgently taken to cath lab. (12 Jul 2022 13:03)      Subjective:  Patient appears comfortable. Unable to appropriately answer most questions.     Hospital Course:   7/13: POD0 s/p  L MCA stroke s/p mechanical cerebral thrombectomy of L distal M1 occlusion TICI 0 to 3. Around 19:00 patient became hypotensive BP 78/46. Patient had received 10mg IV hydralazine prior at 17:00 and 2.5 IV lopressor and started on precedex gtt at 18:00. Precedex gtt stopped and patient remained lethargic and unarousable. On exam, PERRL, did not open eyes, did not follow commands, moving left arm spontaneously and withdrawing in all other extremities, distal pulses 2+, abdomen soft, and groin puncture site with no hematoma. Patient received 2L bolus NS, placed in trendelenberg and levophed gtt started. EICU called and SICU team at bedside performed POCUS. Cardiac function on US grossly WNL and no B-lines visualized. EKG completed with LBBB. Labs sent. Troponin negative. Lactate normal. Hb 13 from 15.9. Patient was taken for urgent CT head without contrast which showed area of hyperdensity in left hemisphere likely from contrast extravasation. Upon return to ICU, patient with improved exam. Opening eyes spontaneously, mixed aphasia, oriented to self, moving all extremities antigravity, and R upper extremity pronator drift.   7/14: overnight  low dose precedex restarted for agitation and then stopped due to SBP below goal. Recieved 250cc bolus 5% albumin for SBP in low 100s. BP drops 100s-110s when sleeping and in 150s when awake overnight.      Vital Signs Last 24 Hrs  T(C): 36.1 (13 Jul 2022 01:29), Max: 36.8 (12 Jul 2022 11:28)  T(F): 97 (13 Jul 2022 01:29), Max: 98.3 (12 Jul 2022 11:28)  HR: 77 (13 Jul 2022 00:30) (61 - 96)  BP: 137/76 (13 Jul 2022 00:30) (78/46 - 179/100)  BP(mean): 101 (13 Jul 2022 00:30) (58 - 120)  RR: 18 (13 Jul 2022 00:30) (15 - 24)  SpO2: 100% (13 Jul 2022 00:30) (90% - 100%)    Parameters below as of 13 Jul 2022 00:30  Patient On (Oxygen Delivery Method): nasal cannula  O2 Flow (L/min): 2      I&O's Detail    12 Jul 2022 07:01  -  13 Jul 2022 01:31  --------------------------------------------------------  IN:    Dexmedetomidine: 21.2 mL    Dexmedetomidine: 5.8 mL    IV PiggyBack: 500 mL    Norepinephrine: 57.6 mL    sodium chloride 0.9%: 675 mL    Sodium Chloride 0.9% Bolus: 2000 mL  Total IN: 3259.6 mL    OUT:    Incontinent per Condom Catheter (mL): 300 mL    Voided (mL): 175 mL  Total OUT: 475 mL    Total NET: 2784.6 mL        I&O's Summary    12 Jul 2022 07:01  -  13 Jul 2022 01:31  --------------------------------------------------------  IN: 3259.6 mL / OUT: 475 mL / NET: 2784.6 mL        PHYSICAL EXAM:  General: patient seen laying supine in bed in NAD  Neuro: awake, alert, Ox1 (self) with choice, does not follow most verbal commands but mimics, receptive and expressive aphasia, FENG x 3 at least antigravity, +RUE pronator drift  HEENT: PERRL, EOMI  Neck: supple  Cardiac: RRR, S1S2  Pulmonary: chest rise symmetric  Abdomen: soft, nontender, nondistended  Ext: perfusing well, DP/PT pulses 2+ b/l  Skin: warm, dry  Wound: R groin safeguard deflated, dressing c/d/i, no hematoma    DIET:  [x] NPO  [] Mechanical  [] Tube feeds    LABS:                        13.0   8.45  )-----------( 250      ( 12 Jul 2022 19:52 )             37.9     07-12    142  |  111<H>  |  30<H>  ----------------------------<  127<H>  3.9   |  20<L>  |  1.06    Ca    8.4      12 Jul 2022 19:52  Phos  3.2     07-12  Mg     1.7     07-12    TPro  5.6<L>  /  Alb  3.5  /  TBili  0.4  /  DBili  0.2  /  AST  28  /  ALT  22  /  AlkPhos  67  07-12    PT/INR - ( 12 Jul 2022 19:52 )   PT: 12.6 sec;   INR: 1.06          PTT - ( 12 Jul 2022 19:52 )  PTT:26.1 sec    CARDIAC MARKERS ( 12 Jul 2022 19:52 )  x     / 0.01 ng/mL / x     / x     / x      CARDIAC MARKERS ( 12 Jul 2022 11:06 )  x     / <0.01 ng/mL / x     / x     / x          CAPILLARY BLOOD GLUCOSE      POCT Blood Glucose.: 129 mg/dL (12 Jul 2022 21:51)  POCT Blood Glucose.: 120 mg/dL (12 Jul 2022 17:20)  POCT Blood Glucose.: 116 mg/dL (12 Jul 2022 13:30)      Drug Levels: [] N/A    CSF Analysis: [] N/A      Allergies    penicillin (Unknown)    Intolerances      MEDICATIONS:  Antibiotics:    Neuro:  acetaminophen     Tablet .. 650 milliGRAM(s) Oral every 6 hours PRN  ondansetron Injectable 4 milliGRAM(s) IV Push every 6 hours PRN    Anticoagulation:    OTHER:  atorvastatin 80 milliGRAM(s) Oral at bedtime  chlorhexidine 2% Cloths 1 Application(s) Topical <User Schedule>  insulin lispro (ADMELOG) corrective regimen sliding scale   SubCutaneous Before meals and at bedtime  pantoprazole  Injectable 40 milliGRAM(s) IV Push daily    IVF:  sodium chloride 0.9%. 1000 milliLiter(s) IV Continuous <Continuous>    CULTURES:    RADIOLOGY & ADDITIONAL TESTS:    CVA    Handoff    MEWS Score    Afib    HTN (hypertension)    GERD (gastroesophageal reflux disease)    HLD (hyperlipidemia)    CVD (cardiovascular disease)    CVA (cerebrovascular accident)    CVA (cerebrovascular accident)    Acute cerebrovascular accident (CVA)    Afib    HTN (hypertension)    CVA (cerebrovascular accident)    HLD (hyperlipidemia)    Angiogram, cerebral, with thrombectomy    S/P ablation of atrial fibrillation    Presence of stent in LAD coronary artery    S/P right coronary artery (RCA) stent placement    S/P arterial stent    AMS    90+    [email or web address]        ASSESSMENT:  76y Male with PMHx of afib (on xarelto), HTN, HLD, recent COVID infection (diagnosed 7/8/22, on Paxlovid) with L MCA stroke s/p mechanical cerebral thrombectomy of L distal M1 occlusion TICI 0 to 3 (7/12).    PLAN:  NEURO  - neuro checks/vital signs q1hr   - groin checks   - precedex gtt dc'd  - pend CTH in AM  - pend PT/OT   - stroke core measures    CARDIO  - SBP goal 120-140  - holding home Xarelto   - Lopressor 2.5 IV q6 dc'd for hypotension  - cont lipitor 80mg qd  - pend echo     PULM  - satting well on RA    GI  - NPO   - cont home PPI  - pend s/s eval in AM    RENAL  - Na goal 135-145  - NS@75    ENDO  - ISS, f/u AM A1C    ID  - afebrile  - COVID+, isolation precautions    HEME  - SCDs for DVT ppx  - pend LE dopplers  - no chemoprophylaxis at this time     DISPO: ICU status, family updated, dispo pend    Assesment and Plan d/w Dr. Lunsford and Dr. Hagen         Assessment:  Present when checked    []  GCS  E   V  M     Heart Failure: []Acute, [] acute on chronic , []chronic  Heart Failure:  [] Diastolic (HFpEF), [] Systolic (HFrEF), []Combined (HFpEF and HFrEF), [] RHF, [] Pulm HTN, [] Other    [] JASWINDER, [] ATN, [] AIN, [] other  [] CKD1, [] CKD2, [] CKD 3, [] CKD 4, [] CKD 5, []ESRD    Encephalopathy: [] Metabolic, [] Hepatic, [] toxic, [] Neurological, [] Other    Abnormal Nurtitional Status: [] malnurtition (see nutrition note), [ ]underweight: BMI < 19, [] morbid obesity: BMI >40, [] Cachexia    [] Sepsis  [] hypovolemic shock,[] cardiogenic shock, [] hemorrhagic shock, [] neuogenic shock  [] Acute Respiratory Failure  []Cerebral edema, [] Brain compression/ herniation,   [] Functional quadriplegia  [] Acute blood loss anemia

## 2022-07-13 NOTE — DIETITIAN INITIAL EVALUATION ADULT - OTHER CALCULATIONS
IBW 70kg (110% IBW); estimated needs based on ABW (% IBW); calorie/protein needs adjusted for increased needs for covid, s/p stroke

## 2022-07-13 NOTE — PHYSICAL THERAPY INITIAL EVALUATION ADULT - GAIT DEVIATIONS NOTED, PT EVAL
benefitted from increased verbal and tactile cuing for sequencing and step length, increased postural sway, impaired proprioception with impaired foot placement/decreased art/decreased step length/decreased weight-shifting ability

## 2022-07-13 NOTE — DIETITIAN INITIAL EVALUATION ADULT - ADD RECOMMEND
-Continue regular diet  -Follow PO intake closely, consider supplements as needed  -Pain and bowel regimen per team  -Follow up nutrition education

## 2022-07-13 NOTE — PHYSICAL THERAPY INITIAL EVALUATION ADULT - MODIFIED CLINICAL TEST OF SENSORY INTEGRATION IN BALANCE TEST
wide base of support eyes open: 15 seconds; narrow base of support eyes open: 5 seconds with mild postural sway

## 2022-07-13 NOTE — PROGRESS NOTE ADULT - ASSESSMENT
76y Male with PMHx of afib (on xarelto), HTN, HLD, recent COVID infection (diagnosed 7/8/22, on Paxlovid) with L MCA stroke s/p mechanical cerebral thrombectomy of L distal M1 occlusion TICI 0 to 3 (7/12).    PLAN:  NEURO  - neuro checks/vital signs q1hr   - groin checks   - precedex gtt dc'd  - pend CTH in AM  - pend PT/OT   - stroke core measures    CARDIO  - SBP goal 120-140  - holding home Xarelto   - Lopressor 2.5 IV q6 dc'd for hypotension  - cont lipitor 80mg qd  - pend echo     PULM  - satting well on RA    GI  - Diet: NPO pending s/s eval  - cont home PPI  - pend s/s eval in AM    RENAL  - Na goal 135-145  - NS@75    ENDO  - ISS, f/u AM A1C    ID  - afebrile  - COVID+, isolation precautions    HEME  - SCDs for DVT ppx  - pend LE dopplers  - no chemoprophylaxis at this time     DISPO: ICU status, family updated, dispo pend 76y Male with PMHx of afib (on xarelto), HTN, HLD, recent COVID infection (diagnosed 7/8/22, on Paxlovid) with L MCA stroke s/p mechanical cerebral thrombectomy of L distal M1 occlusion TICI 0 to 3 (7/12).    PLAN:  NEURO  L ICA dissection (non-occlusive) was noted   Mechanism: likely cardioembolic  No tpa d/t risks of AC (taken xarelto ?3 hours PTA and on paxlovid)  - neuro checks/vital signs q1hr   - groin checks   - pend CTH in AM to monitor heme  - pend PT/OT   - hold AC/AP pending NSG clearance, will need AC once cleared  - stroke core measures    CARDIO  CVD (mid LAD, mid RCA, OM1, OM2, OM3 stents [id number])  HTN, HLD  [id number]  - SBP goal 120-140  - holding home Xarelto   - Lopressor 2.5 IV q6 dc'd for hypotension  - cont lipitor 80mg qd  - pend echo   - tele    PULM  - satting well on RA    GI  - Diet: NPO pending s/s eval  - cont home PPI  - pend s/s eval in AM    RENAL  - Na goal 135-145  - NS@75 until tolerating full diet    ENDO  A1c 5.6 LDL 54  - euglycemic goal      ID  COVID + 7/8, started on paxlovid OSH  - afebrile  - COVID+, isolation precautions    HEME  - SCDs for DVT ppx  - pend LE dopplers  - no chemoprophylaxis at this time d/t fresh heme     76y Male with PMHx of afib (on xarelto), HTN, HLD, recent COVID infection (diagnosed 7/8/22, on Paxlovid) with L MCA stroke s/p mechanical cerebral thrombectomy of L distal M1 occlusion TICI 0 to 3 (7/12).    PLAN:  NEURO  L ICA dissection (non-occlusive) was noted   Mechanism: likely cardioembolic  No tpa d/t risks of AC (taken xarelto ?3 hours PTA and on paxlovid)  - neuro checks/vital signs q2hr pending cth  - groin checks   - pend CTH today to monitor heme  - pend PT/OT, speech  - stroke neurology c/s  - hold AC/AP pending NSG clearance, will need AC once cleared by nsg  - stroke core measures    CARDIO  Afib on xarelto, dose decreased recently per PCP presumed for starting paxlovid since it accentuates dose?  CVD (mid LAD, mid RCA, OM1, OM2, OM3 stents [id number])  HTN, HLD  CHADSVASC5  - SBP goal 120-160  - holding home Xarelto   - labetalol IVP PRN to maintain parameters, restart home metoprolol 12.5mg as tolerated  - cont lipitor 80mg qd  - pend echo   - tele    PULM  - satting well on RA    GI  - Diet: NPO pending s/s eval  - cont home PPI  - pend s/s eval in AM    RENAL  - Na goal 135-145  - NS@75 until tolerating full diet  - trend renal function  - condom cath    ENDO  A1c 5.6 LDL 54  - euglycemic goal    ID  COVID + 7/8, started on paxlovid OSH  - afebrile  - COVID+, isolation precautions    HEME  - SCDs for DVT ppx  - pend LE dopplers  - no chemoprophylaxis at this time pending CTH and ?initiating xarelto; if not will plan to start tonight if cth stable     76y Male with PMHx of afib (on xarelto), HTN, HLD, recent COVID infection (diagnosed 7/8/22, on Paxlovid) with L MCA stroke s/p mechanical cerebral thrombectomy of L distal M1 occlusion TICI 0 to 3 (7/12).    PLAN:  NEURO  L ICA dissection (non-occlusive) was noted   Mechanism: likely cardioembolic  No tpa d/t risks of AC (taken xarelto ?3 hours PTA and on paxlovid)  - neuro checks/vital signs q2hr pending cth  - groin checks   - pend CTH today to monitor heme  - pend PT/OT, speech  - stroke neurology c/s  - MRI pending  - hold AC/AP pending NSG clearance, will need AC once cleared by nsg  - stroke core measures    CARDIO  Afib on xarelto, dose decreased recently per PCP presumed for starting paxlovid since it accentuates dose?  CVD (mid LAD, mid RCA, OM1, OM2, OM3 stents [id number])  HTN, HLD  CHADSVASC5  - SBP goal 120-160  - holding home Xarelto   - labetalol IVP PRN to maintain parameters, restart home metoprolol 12.5mg as tolerated  - cont lipitor 80mg qd  - pend echo   - tele    PULM  - satting well on RA    GI  - Diet: NPO pending s/s eval  - cont home PPI  - pend s/s eval in AM    RENAL  - Na goal 135-145  - NS@75 until tolerating full diet  - trend renal function  - condom cath    ENDO  A1c 5.6 LDL 54  - euglycemic goal    ID  COVID + 7/8, started on paxlovid OSH  - afebrile  - COVID+, isolation precautions    HEME  - SCDs for DVT ppx  - pend LE dopplers  - no chemoprophylaxis at this time pending CTH and ?initiating xarelto; if not will plan to start tonight if cth stable

## 2022-07-13 NOTE — OCCUPATIONAL THERAPY INITIAL EVALUATION ADULT - GENERAL OBSERVATIONS, REHAB EVAL
OT IE Completed. MRS 4. Pt's RN Carissa and MD So cleared pt for therapy. Pt received semisupine +Airborne & contact precautions, +[place] cath, +L IV infusing, room air, NAD, agreeable to therapy. PT Murali present. Pt left as found, all lines in tact.

## 2022-07-13 NOTE — OCCUPATIONAL THERAPY INITIAL EVALUATION ADULT - MANUAL MUSCLE TESTING RESULTS, REHAB EVAL
LUE shoulder flexion/extension 5/5, LUE bicep flexion/extension 5/5, LUE  strength 5/5. RUE shoulder flexion/extension 4/5, RUE bicep flexion/extension 4+/5, RUE  strength 4/5. LLE grossly 5/5 throughout. RLE 5/5 throughout except R hip flexion 4/5.

## 2022-07-13 NOTE — SWALLOW BEDSIDE ASSESSMENT ADULT - SLP PERTINENT HISTORY OF CURRENT PROBLEM
L MCA stroke, s/p mechanical thrombectomy, CTH - SAH vs. contrast extravasation on L sylvian fissure

## 2022-07-13 NOTE — OCCUPATIONAL THERAPY INITIAL EVALUATION ADULT - SENSORY TESTS
CN Testing: V1-V3 sensation in tact; b/l frontalis intact; unable to assess buccinator 2/2 difficulty motor planning and understanding commands; smile with R facial droop; tongue protrusion at midline w/ b/l lateralization in tact; b/l eyes open/close intact; b/l shoulder elevation intact

## 2022-07-13 NOTE — DIETITIAN INITIAL EVALUATION ADULT - OTHER INFO
76y Male with PMHx of afib (on xarelto, recently decreased?), HTN, HLD, recent COVID infection (diagnosed 7/8/22, on Paxlovid) with L MCA stroke s/p mechanical cerebral thrombectomy of L distal M1 occlusion TICI 3 (7/12).    Pt assessed at bedside. S/p swallow eval, with functional oropharyngeal swallow per SLP. Regular diet ordered. Dinner ordered for tonight. No N/V/D/C. Pt reports no changes in intake or weights PTA. No special diet at home. Reviewed adequate intake parameters with pt, focusing on nutrient dense foods as likely to have decreased appetite. No pain noted. Hilario 15. Surgical incision noted. RD to follow, nutrition recommendations below.

## 2022-07-14 LAB
ANION GAP SERPL CALC-SCNC: 13 MMOL/L — SIGNIFICANT CHANGE UP (ref 5–17)
ANION GAP SERPL CALC-SCNC: 15 MMOL/L — SIGNIFICANT CHANGE UP (ref 5–17)
APPEARANCE UR: CLEAR — SIGNIFICANT CHANGE UP
BACTERIA # UR AUTO: PRESENT /HPF
BILIRUB UR-MCNC: NEGATIVE — SIGNIFICANT CHANGE UP
BUN SERPL-MCNC: 21 MG/DL — SIGNIFICANT CHANGE UP (ref 7–23)
BUN SERPL-MCNC: 21 MG/DL — SIGNIFICANT CHANGE UP (ref 7–23)
CALCIUM SERPL-MCNC: 8.8 MG/DL — SIGNIFICANT CHANGE UP (ref 8.4–10.5)
CALCIUM SERPL-MCNC: 8.9 MG/DL — SIGNIFICANT CHANGE UP (ref 8.4–10.5)
CHLORIDE SERPL-SCNC: 103 MMOL/L — SIGNIFICANT CHANGE UP (ref 96–108)
CHLORIDE SERPL-SCNC: 110 MMOL/L — HIGH (ref 96–108)
CO2 SERPL-SCNC: 18 MMOL/L — LOW (ref 22–31)
CO2 SERPL-SCNC: 22 MMOL/L — SIGNIFICANT CHANGE UP (ref 22–31)
COLOR SPEC: YELLOW — SIGNIFICANT CHANGE UP
CREAT SERPL-MCNC: 0.98 MG/DL — SIGNIFICANT CHANGE UP (ref 0.5–1.3)
CREAT SERPL-MCNC: 1.13 MG/DL — SIGNIFICANT CHANGE UP (ref 0.5–1.3)
DIFF PNL FLD: ABNORMAL
EGFR: 67 ML/MIN/1.73M2 — SIGNIFICANT CHANGE UP
EGFR: 79 ML/MIN/1.73M2 — SIGNIFICANT CHANGE UP
EPI CELLS # UR: SIGNIFICANT CHANGE UP /HPF (ref 0–5)
GLUCOSE BLDC GLUCOMTR-MCNC: 106 MG/DL — HIGH (ref 70–99)
GLUCOSE BLDC GLUCOMTR-MCNC: 83 MG/DL — SIGNIFICANT CHANGE UP (ref 70–99)
GLUCOSE BLDC GLUCOMTR-MCNC: 87 MG/DL — SIGNIFICANT CHANGE UP (ref 70–99)
GLUCOSE BLDC GLUCOMTR-MCNC: 94 MG/DL — SIGNIFICANT CHANGE UP (ref 70–99)
GLUCOSE SERPL-MCNC: 103 MG/DL — HIGH (ref 70–99)
GLUCOSE SERPL-MCNC: 139 MG/DL — HIGH (ref 70–99)
GLUCOSE UR QL: NEGATIVE — SIGNIFICANT CHANGE UP
HCT VFR BLD CALC: 42 % — SIGNIFICANT CHANGE UP (ref 39–50)
HGB BLD-MCNC: 13.7 G/DL — SIGNIFICANT CHANGE UP (ref 13–17)
KETONES UR-MCNC: NEGATIVE — SIGNIFICANT CHANGE UP
LEUKOCYTE ESTERASE UR-ACNC: NEGATIVE — SIGNIFICANT CHANGE UP
MAGNESIUM SERPL-MCNC: 2.5 MG/DL — SIGNIFICANT CHANGE UP (ref 1.6–2.6)
MCHC RBC-ENTMCNC: 31.3 PG — SIGNIFICANT CHANGE UP (ref 27–34)
MCHC RBC-ENTMCNC: 32.6 GM/DL — SIGNIFICANT CHANGE UP (ref 32–36)
MCV RBC AUTO: 95.9 FL — SIGNIFICANT CHANGE UP (ref 80–100)
NITRITE UR-MCNC: NEGATIVE — SIGNIFICANT CHANGE UP
NRBC # BLD: 0 /100 WBCS — SIGNIFICANT CHANGE UP (ref 0–0)
PH UR: 6 — SIGNIFICANT CHANGE UP (ref 5–8)
PHOSPHATE SERPL-MCNC: 2.9 MG/DL — SIGNIFICANT CHANGE UP (ref 2.5–4.5)
PLATELET # BLD AUTO: 218 K/UL — SIGNIFICANT CHANGE UP (ref 150–400)
POTASSIUM SERPL-MCNC: 4 MMOL/L — SIGNIFICANT CHANGE UP (ref 3.5–5.3)
POTASSIUM SERPL-MCNC: SIGNIFICANT CHANGE UP (ref 3.5–5.3)
POTASSIUM SERPL-SCNC: 4 MMOL/L — SIGNIFICANT CHANGE UP (ref 3.5–5.3)
POTASSIUM SERPL-SCNC: SIGNIFICANT CHANGE UP (ref 3.5–5.3)
PROT UR-MCNC: NEGATIVE MG/DL — SIGNIFICANT CHANGE UP
RBC # BLD: 4.38 M/UL — SIGNIFICANT CHANGE UP (ref 4.2–5.8)
RBC # FLD: 13.2 % — SIGNIFICANT CHANGE UP (ref 10.3–14.5)
RBC CASTS # UR COMP ASSIST: ABNORMAL /HPF
SODIUM SERPL-SCNC: 138 MMOL/L — SIGNIFICANT CHANGE UP (ref 135–145)
SODIUM SERPL-SCNC: 143 MMOL/L — SIGNIFICANT CHANGE UP (ref 135–145)
SP GR SPEC: 1.01 — SIGNIFICANT CHANGE UP (ref 1–1.03)
UROBILINOGEN FLD QL: 0.2 E.U./DL — SIGNIFICANT CHANGE UP
WBC # BLD: 11.31 K/UL — HIGH (ref 3.8–10.5)
WBC # FLD AUTO: 11.31 K/UL — HIGH (ref 3.8–10.5)
WBC UR QL: < 5 /HPF — SIGNIFICANT CHANGE UP

## 2022-07-14 PROCEDURE — 99233 SBSQ HOSP IP/OBS HIGH 50: CPT

## 2022-07-14 PROCEDURE — 99223 1ST HOSP IP/OBS HIGH 75: CPT

## 2022-07-14 PROCEDURE — 93970 EXTREMITY STUDY: CPT | Mod: 26

## 2022-07-14 RX ORDER — LANOLIN ALCOHOL/MO/W.PET/CERES
5 CREAM (GRAM) TOPICAL AT BEDTIME
Refills: 0 | Status: DISCONTINUED | OUTPATIENT
Start: 2022-07-14 | End: 2022-07-18

## 2022-07-14 RX ORDER — HYDRALAZINE HCL 50 MG
5 TABLET ORAL ONCE
Refills: 0 | Status: COMPLETED | OUTPATIENT
Start: 2022-07-14 | End: 2022-07-14

## 2022-07-14 RX ORDER — METOPROLOL TARTRATE 50 MG
12.5 TABLET ORAL DAILY
Refills: 0 | Status: DISCONTINUED | OUTPATIENT
Start: 2022-07-14 | End: 2022-07-14

## 2022-07-14 RX ORDER — METOPROLOL TARTRATE 50 MG
12.5 TABLET ORAL
Refills: 0 | Status: DISCONTINUED | OUTPATIENT
Start: 2022-07-14 | End: 2022-07-15

## 2022-07-14 RX ADMIN — PANTOPRAZOLE SODIUM 40 MILLIGRAM(S): 20 TABLET, DELAYED RELEASE ORAL at 12:44

## 2022-07-14 RX ADMIN — Medication 5 MILLIGRAM(S): at 22:31

## 2022-07-14 RX ADMIN — ENOXAPARIN SODIUM 40 MILLIGRAM(S): 100 INJECTION SUBCUTANEOUS at 20:07

## 2022-07-14 RX ADMIN — CHLORHEXIDINE GLUCONATE 1 APPLICATION(S): 213 SOLUTION TOPICAL at 06:34

## 2022-07-14 RX ADMIN — ATORVASTATIN CALCIUM 80 MILLIGRAM(S): 80 TABLET, FILM COATED ORAL at 21:38

## 2022-07-14 RX ADMIN — Medication 12.5 MILLIGRAM(S): at 16:51

## 2022-07-14 NOTE — PROGRESS NOTE ADULT - NS ATTEND AMEND GEN_ALL_CORE FT
The patient is a 76-year-old man with atrial fibrillation (previuosly on Xarelto), hypertension, hyperlipidemia, and recent COVID infection for which he was started on Paxlovid last week.  Initial NIHSS 28 for a left MCA syndrome. He was not a tpa candidate given it was unclear if he was taking Xeralto/last dose. He s/p successful thrombectomy with significant improvement in his exam. Repeat HCT shows area of contrast staining +/- SAH. Plan to obtain MRI brain. WHen able, will likely transition to [facility] for better stroke prevention in future.  Will need f/u of carotid dissection (?Iatrogenic vs related to stroke).

## 2022-07-14 NOTE — SPEECH LANGUAGE PATHOLOGY EVALUATION - SLP DIAGNOSIS
INCOMPLETE phonemic paraphasias (usually non-words) and neologisms. Mild receptive language deficits which are occasionally exacerbated by stuck-in-set perseverations. Pt was reliable for basic yes/no questions, can follow 2-step simple directives, and point to target stimuli amongst large fields. Pt presents with moderate-severe non-fluent aphasia. Assessment revealed difficulties with confrontational naming repetition, and verbal fluency. Verbal output was characterized by a combination of real words and phonemic paraphasias (usually non-words) and neologisms. Pt benefited from multi-modal cueing, including phonemic and orthographic cues. Pt demonstrated good self-awareness of expressive language deficits. Mild receptive language deficits which are occasionally exacerbated by stuck-in-set perseverations. Pt was reliable for basic yes/no questions, can follow 2-step simple directives, and point to target stimuli amongst large fields. Further assessment of written expression and reading skills is warranted. Pt would benefit from skilled SLP tx in an inpatient rehab setting with focus on functional expressive communication with use of multi-modal cues/support for improved expression.

## 2022-07-14 NOTE — SPEECH LANGUAGE PATHOLOGY EVALUATION - SLP AUTOMATIC SPEECH
Recited days of the week and months of the year given frequent multi-modal cues including phonemic cues and orthographic cues. Occasional phonemic paraphasias present (e.g. "Stu-day"/Tuesday, "Men-day"/Wednesday, "Ther-die-tray"/Thursday). Speech errors during recitation of months were a combination of both paraphasic and stuck-in-set perseverative errors (e.g. "Nando-bers-day"/July)./impaired Recited days of the week and months of the year given frequent multi-modal cues including phonemic cues and orthographic cues. Occasional phonemic paraphasias present (e.g. "Stu-day"/Tuesday, "Men-day"/Wednesday, "Ther-die-tray"/Thursday). Speech errors during recitation of months were a combination of both paraphasic and stuck-in-set perseverative errors (e.g. "Jai-bers-day"/July). Counting 1-10: recited numbers fluently and independently/impaired/days of the week/months of the year

## 2022-07-14 NOTE — SPEECH LANGUAGE PATHOLOGY EVALUATION - SLP CONVERSATIONAL SPEECH
Speech was non-fluent. Utterances were characterized by a combination of real words and phonemic/neologistic paraphasias. These errors were present in all tasks including spontaneous speech, repetition, confrontation naming, and reading.

## 2022-07-14 NOTE — SPEECH LANGUAGE PATHOLOGY EVALUATION - BODY PART ID
2/6 independently. Errors were characterized by perseverative responses. Accuracy increased with gestural imitation./impaired

## 2022-07-14 NOTE — PROGRESS NOTE ADULT - SUBJECTIVE AND OBJECTIVE BOX
Neurology Stroke Progress Note    INTERVAL HPI/OVERNIGHT EVENTS:  Patient seen and examined. As per overnight nurse, he was attempting to get out of bed and did not get much sleep. Ordered him to begin 5mg Melatonin at night. Patient's speech is continuing to improve. Denies any headache or discomfort.     MEDICATIONS  (STANDING):  atorvastatin 80 milliGRAM(s) Oral at bedtime  chlorhexidine 2% Cloths 1 Application(s) Topical <User Schedule>  enoxaparin Injectable 40 milliGRAM(s) SubCutaneous every 24 hours  insulin lispro (ADMELOG) corrective regimen sliding scale   SubCutaneous Before meals and at bedtime  melatonin 5 milliGRAM(s) Oral at bedtime  metoprolol succinate ER 12.5 milliGRAM(s) Oral daily  pantoprazole  Injectable 40 milliGRAM(s) IV Push daily    MEDICATIONS  (PRN):  acetaminophen     Tablet .. 650 milliGRAM(s) Oral every 6 hours PRN Temp greater or equal to 38C (100.4F), Mild Pain (1 - 3)  ondansetron Injectable 4 milliGRAM(s) IV Push every 6 hours PRN Nausea and/or Vomiting  senna 2 Tablet(s) Oral at bedtime PRN Constipation      Allergies    penicillin (Unknown)    Intolerances    Vital Signs Last 24 Hrs  T(C): 35.8 (14 Jul 2022 10:00), Max: 37.3 (14 Jul 2022 05:09)  T(F): 96.5 (14 Jul 2022 10:00), Max: 99.2 (14 Jul 2022 05:09)  HR: 66 (14 Jul 2022 12:47) (64 - 79)  BP: 144/83 (14 Jul 2022 12:47) (127/86 - 166/72)  BP(mean): 108 (14 Jul 2022 12:47) (94 - 117)  RR: 18 (14 Jul 2022 12:47) (18 - 18)  SpO2: 97% (14 Jul 2022 12:47) (95% - 98%)    Parameters below as of 14 Jul 2022 12:47  Patient On (Oxygen Delivery Method): room air    Physical exam:  General: No acute distress, awake and alert  Eyes: Anicteric sclerae, moist conjunctivae, see below for CNs  Neck: trachea midline, FROM, supple, no thyromegaly or lymphadenopathy  Cardiovascular: Regular rate and rhythm, no murmurs, rubs, or gallops. No carotid bruits.   Pulmonary: Anterior breath sounds clear bilaterally, no crackles or wheezing. No use of accessory muscles  GI: Abdomen soft, non-distended, non-tender  Extremities: Radial and DP pulses +2, no edema    Neurologic:  -Mental status: Awake, alert, oriented to person, place, and time. Speech is fluent with intact naming, repetition, and comprehension, no dysarthria. Recent and remote memory intact. Follows commands. Attention/concentration intact. Fund of knowledge appropriate.  -Cranial nerves:   II: Visual fields are full to confrontation.  III, IV, VI: Extraocular movements are intact without nystagmus. Pupils equally round and reactive to light  V:  Facial sensation V1-V3 equal and intact   VII: Face is symmetric with normal eye closure and smile  VIII: Hearing is bilaterally intact to finger rub  IX, X: Uvula is midline and soft palate rises symmetrically  XI: Head turning and shoulder shrug are intact.  XII: Tongue protrudes midline  Motor: Normal bulk and tone. No pronator drift. Strength bilateral upper extremity 5/5, bilateral lower extremities 5/5.  Rapid alternating movements intact and symmetric  Sensation: Intact to light touch bilaterally. No neglect or extinction on double simultaneous testing.  Coordination: No dysmetria on finger-to-nose and heel-to-shin bilaterally  Reflexes: Downgoing toes bilaterally   Gait: Narrow gait and steady    LABS:                        13.7   11.31 )-----------( 218      ( 14 Jul 2022 05:58 )             42.0     07-14    138  |  103  |  21  ----------------------------<  139<H>  4.0   |  22  |  1.13    Ca    8.9      14 Jul 2022 10:18  Phos  2.9     07-14  Mg     2.5     07-14    TPro  5.6<L>  /  Alb  3.5  /  TBili  0.4  /  DBili  0.2  /  AST  28  /  ALT  22  /  AlkPhos  67  07-12    PT/INR - ( 12 Jul 2022 19:52 )   PT: 12.6 sec;   INR: 1.06          PTT - ( 12 Jul 2022 19:52 )  PTT:26.1 sec      RADIOLOGY & ADDITIONAL TESTS:     Neurology Stroke Progress Note    INTERVAL HPI/OVERNIGHT EVENTS:  Patient seen and examined. As per overnight nurse, he was attempting to get out of bed and did not get much sleep. Ordered him to begin 5mg Melatonin at night. Patient's speech is continuing to improve. Denies any headache or discomfort.     MEDICATIONS  (STANDING):  atorvastatin 80 milliGRAM(s) Oral at bedtime  chlorhexidine 2% Cloths 1 Application(s) Topical <User Schedule>  enoxaparin Injectable 40 milliGRAM(s) SubCutaneous every 24 hours  insulin lispro (ADMELOG) corrective regimen sliding scale   SubCutaneous Before meals and at bedtime  melatonin 5 milliGRAM(s) Oral at bedtime  metoprolol succinate ER 12.5 milliGRAM(s) Oral daily  pantoprazole  Injectable 40 milliGRAM(s) IV Push daily    MEDICATIONS  (PRN):  acetaminophen     Tablet .. 650 milliGRAM(s) Oral every 6 hours PRN Temp greater or equal to 38C (100.4F), Mild Pain (1 - 3)  ondansetron Injectable 4 milliGRAM(s) IV Push every 6 hours PRN Nausea and/or Vomiting  senna 2 Tablet(s) Oral at bedtime PRN Constipation      Allergies    penicillin (Unknown)    Intolerances    Vital Signs Last 24 Hrs  T(C): 35.8 (14 Jul 2022 10:00), Max: 37.3 (14 Jul 2022 05:09)  T(F): 96.5 (14 Jul 2022 10:00), Max: 99.2 (14 Jul 2022 05:09)  HR: 66 (14 Jul 2022 12:47) (64 - 79)  BP: 144/83 (14 Jul 2022 12:47) (127/86 - 166/72)  BP(mean): 108 (14 Jul 2022 12:47) (94 - 117)  RR: 18 (14 Jul 2022 12:47) (18 - 18)  SpO2: 97% (14 Jul 2022 12:47) (95% - 98%)    Parameters below as of 14 Jul 2022 12:47  Patient On (Oxygen Delivery Method): room air    Physical exam:  General: No acute distress, awake and alert  Eyes: Anicteric sclerae, moist conjunctivae, see below for CNs  Neck: trachea midline  Cardiovascular: Regular rate and rhythm on monitor  Pulmonary: No use of accessory muscles  GI: non distended  Extremities: no edema    Neurologic:  -Mental status: Awake, alert, able to state his full name. Attempts to say he is in the hospital. Able to state that today is the "14th", unable to state the month but when given choices on whether it's "June, July, or August", seems to be attempting to say "the middle one". When asked if it is July, he states yes". Expressive aphasia present although improving.   -Cranial nerves:   II: Visual fields are full to confrontation.  III, IV, VI: Extraocular movements are intact without nystagmus. Pupils equally round and reactive to light. Left gaze preference has improved since yesterday, able to cross midline and bury eyes to the right.   V:  Facial sensation V1-V3 equal and intact   VII: Right facial droop  VIII: Hearing is bilaterally intact to finger rub  IX, X: Uvula is midline and soft palate rises symmetrically  XI: Head turning and shoulder shrug are intact.  XII: Tongue protrudes midline  Motor: Normal bulk and tone. No pronator drift. Strength bilateral upper extremity 5/5, bilateral lower extremities 5/5.  Rapid alternating movements intact and symmetric  Sensation: Intact to light touch bilaterally. No neglect or extinction on double simultaneous testing.  Coordination: No dysmetria on finger-to-nose and heel-to-shin bilaterally  Reflexes: Downgoing toes bilaterally   Gait: Narrow gait and steady    LABS:                        13.7   11.31 )-----------( 218      ( 14 Jul 2022 05:58 )             42.0     07-14    138  |  103  |  21  ----------------------------<  139<H>  4.0   |  22  |  1.13    Ca    8.9      14 Jul 2022 10:18  Phos  2.9     07-14  Mg     2.5     07-14    TPro  5.6<L>  /  Alb  3.5  /  TBili  0.4  /  DBili  0.2  /  AST  28  /  ALT  22  /  AlkPhos  67  07-12    PT/INR - ( 12 Jul 2022 19:52 )   PT: 12.6 sec;   INR: 1.06          PTT - ( 12 Jul 2022 19:52 )  PTT:26.1 sec      RADIOLOGY & ADDITIONAL TESTS:     Neurology Stroke Progress Note    INTERVAL HPI/OVERNIGHT EVENTS:  Patient seen and examined. As per overnight nurse, he was attempting to get out of bed and did not get much sleep. Ordered him to begin 5mg Melatonin at night. Patient's speech is continuing to improve. Denies any headache or discomfort.     MEDICATIONS  (STANDING):  atorvastatin 80 milliGRAM(s) Oral at bedtime  chlorhexidine 2% Cloths 1 Application(s) Topical <User Schedule>  enoxaparin Injectable 40 milliGRAM(s) SubCutaneous every 24 hours  insulin lispro (ADMELOG) corrective regimen sliding scale   SubCutaneous Before meals and at bedtime  melatonin 5 milliGRAM(s) Oral at bedtime  metoprolol succinate ER 12.5 milliGRAM(s) Oral daily  pantoprazole  Injectable 40 milliGRAM(s) IV Push daily    MEDICATIONS  (PRN):  acetaminophen     Tablet .. 650 milliGRAM(s) Oral every 6 hours PRN Temp greater or equal to 38C (100.4F), Mild Pain (1 - 3)  ondansetron Injectable 4 milliGRAM(s) IV Push every 6 hours PRN Nausea and/or Vomiting  senna 2 Tablet(s) Oral at bedtime PRN Constipation      Allergies    penicillin (Unknown)    Intolerances    Vital Signs Last 24 Hrs  T(C): 35.8 (14 Jul 2022 10:00), Max: 37.3 (14 Jul 2022 05:09)  T(F): 96.5 (14 Jul 2022 10:00), Max: 99.2 (14 Jul 2022 05:09)  HR: 66 (14 Jul 2022 12:47) (64 - 79)  BP: 144/83 (14 Jul 2022 12:47) (127/86 - 166/72)  BP(mean): 108 (14 Jul 2022 12:47) (94 - 117)  RR: 18 (14 Jul 2022 12:47) (18 - 18)  SpO2: 97% (14 Jul 2022 12:47) (95% - 98%)    Parameters below as of 14 Jul 2022 12:47  Patient On (Oxygen Delivery Method): room air    Physical exam:  General: No acute distress, awake and alert  Eyes: Anicteric sclerae, moist conjunctivae, see below for CNs  Neck: trachea midline  Cardiovascular: Regular rate and rhythm on monitor  Pulmonary: No use of accessory muscles  GI: non distended  Extremities: no edema    Neurologic:  -Mental status: Awake, alert, able to state his full name. Attempts to say he is in the hospital. Able to state that today is the "14th", unable to state the month but when given choices on whether it's "June, July, or August", seems to be attempting to say "the middle one". When asked if it is July, he states yes". Expressive aphasia present although improving.   -Cranial nerves:   II: Visual fields are full to confrontation.  III, IV, VI: Extraocular movements are intact without nystagmus. Pupils equally round and reactive to light. Left gaze preference has improved since yesterday, able to cross midline and bury eyes to the right.   V:  Facial sensation V1-V3 equal and intact   VII: Right facial droop  Motor: Normal bulk and tone. No pronator drift. Right upper and lower extremity 4/5, left upper and lower extremity 5/5.   Sensation: Intact to light touch bilaterally. No neglect or extinction on double simultaneous testing.  Coordination: Slight dysmetria on FTN with right arm.     LABS:                        13.7   11.31 )-----------( 218      ( 14 Jul 2022 05:58 )             42.0     07-14    138  |  103  |  21  ----------------------------<  139<H>  4.0   |  22  |  1.13    Ca    8.9      14 Jul 2022 10:18  Phos  2.9     07-14  Mg     2.5     07-14    TPro  5.6<L>  /  Alb  3.5  /  TBili  0.4  /  DBili  0.2  /  AST  28  /  ALT  22  /  AlkPhos  67  07-12    PT/INR - ( 12 Jul 2022 19:52 )   PT: 12.6 sec;   INR: 1.06          PTT - ( 12 Jul 2022 19:52 )  PTT:26.1 sec      RADIOLOGY & ADDITIONAL TESTS:    < from: CT Head No Cont (07.13.22 @ 11:30) >    IMPRESSION: Minimal decrease in subarachnoid hemorrhage in the left   superior fissure and sulci of the left parietal lobe. No new hemorrhage   is identified.    < end of copied text >

## 2022-07-14 NOTE — SPEECH LANGUAGE PATHOLOGY EVALUATION - SLP PROGNOSIS
good given self-awareness, family support, and relatively preserved BASIC receptive language skills (single word comprehension)

## 2022-07-14 NOTE — SPEECH LANGUAGE PATHOLOGY EVALUATION - COMMENTS
WH- orientation questions:   -Name ind.   -Location: given binary written choices, wrote hospital name ind. ("[facility]")  -Year: given orthographic cue "20...," pt filled in "22"; initially wrote "02" but self-corrected Family at bedside who reported spontaneous improvement since yesterday. Radha Theft Picture Description Task:  Pt: “Somebody is a…somebody is a…reeking for the chilney [reaching for the cookies], and he is going to fall from the…[unintelligible].”  SLP: “What is the man doing?”  Pt: “He has a predge [He has a plate]. He’s uh chunging david [washing the dishes].”     SLP: “What about the dog?”  Pt: “He’s eating something.”  SLP: “What’s happening with the water?”  Pt: “Silgis are seeking [The water is overflowing].” Relatively good self-awareness of deficits. Further assessment is warranted. Family at bedside who reported spontaneous improvement since yesterday.    INCOMPLETE Communication strategies were discussed at length with pt and family members.     Strategies:  -Encourage use of gestures, writing, and visuals for supplemental communication  -Provide cueing (phonemic-first sound, orthographic-written representation of first letter)    Goals:   Pt will label functional items around his room with 60% accuracy given moderate verbal and orthographic cues.   Pt will repeat functional 2-word phrases(incorporating Melodic Intonation Therapy) with 60% accuracy given moderate cues over 3 consecutive sessions.   Additional goals to be added following testing of writing and reading skills

## 2022-07-14 NOTE — SPEECH LANGUAGE PATHOLOGY EVALUATION - SLP CRITERIA FOR SKILLED THERAPEUTIC INTERVENTIONS MET
Quality 265: Biopsy Follow-Up: Biopsy results reviewed, communicated, tracked, and documented Detail Level: Detailed skilled criteria for intervention met

## 2022-07-14 NOTE — CONSULT NOTE ADULT - SUBJECTIVE AND OBJECTIVE BOX
Patient is a 76y old  Male who presents with a chief complaint of CVA     (13 Jul 2022 18:51)        HPI:  77 y/o male non-smoker w/ pmHx of Afib (on Xarelto), CVD (mid LAD, mid RCA, OM1, OM2, OM3 stents 4793-7274), HTN, HLD, GERD, and COVID infection 7/8/22 (tx w/ Paxlovid) presented to ED for unwitnessed fall around 10:15am 7/12/22. Patient's spouse states she heard a bang in a separate room at the house and found her  on the carpet. Patient was conscious, facedown, non-verbal, and moving only one of his arms (spouse could not recall right or left). No head trauma, LOC, or seizure activity was witnessed. This has never happened before.      Upon arrival to ED, patient had L gaze, aphasic, R sided plegia. CTH with hyperdense left MCA which was suspicious for thrombus and acute left MCA territory infarct. CTA with occlusion of the midportion of the left M1 segment to the proximal M2 segment. CTP with abnormal perfusion left MCA mismatch. BP in ED 170s/100.    Of note, wife reports they just got back from a 3 week trip from [facility] and were on a 12 hour flight on 7/7. Patient tested positive for COVID on 7/8 and was prescribed Paxlovid. Wife states patient usually takes his xarelto and other medications every morning around 9 or 10am, is unsure if he took them today. Patient is receiving care with cardiologist Dr. Brett Raya of [place] ([phone]). Patient is s/p mid LAD stent (April 2019), mid RCA stent (2016), and OM1-OM3 stents (April 2014).     Decision was made to take patient to thrombectomy with verbal consent from spouse. Patient was urgently taken to cath lab. (12 Jul 2022 13:03)      PAST MEDICAL & SURGICAL HISTORY:  Afib      HTN (hypertension)      GERD (gastroesophageal reflux disease)      HLD (hyperlipidemia)      CVD (cardiovascular disease)      S/P ablation of atrial fibrillation      Presence of stent in LAD coronary artery      S/P right coronary artery (RCA) stent placement      S/P arterial stent          MEDICATIONS  (STANDING):  atorvastatin 80 milliGRAM(s) Oral at bedtime  chlorhexidine 2% Cloths 1 Application(s) Topical <User Schedule>  enoxaparin Injectable 40 milliGRAM(s) SubCutaneous every 24 hours  insulin lispro (ADMELOG) corrective regimen sliding scale   SubCutaneous Before meals and at bedtime  melatonin 5 milliGRAM(s) Oral at bedtime  pantoprazole  Injectable 40 milliGRAM(s) IV Push daily    MEDICATIONS  (PRN):  acetaminophen     Tablet .. 650 milliGRAM(s) Oral every 6 hours PRN Temp greater or equal to 38C (100.4F), Mild Pain (1 - 3)  ondansetron Injectable 4 milliGRAM(s) IV Push every 6 hours PRN Nausea and/or Vomiting  senna 2 Tablet(s) Oral at bedtime PRN Constipation        Social History:                   -  Home Living Status:  lives with his wife and daughter in an elevator accessible apartment building         -  Prior Home Care Services :   none    Baseline Functional Level Prior to Admission :             - ADL's/ IADL's :  independent         - ambulatory status PTA :  walks without DME        FAMILY HISTORY:    CBC Full  -  ( 14 Jul 2022 05:58 )  WBC Count : 11.31 K/uL  RBC Count : 4.38 M/uL  Hemoglobin : 13.7 g/dL  Hematocrit : 42.0 %  Platelet Count - Automated : 218 K/uL  Mean Cell Volume : 95.9 fl  Mean Cell Hemoglobin : 31.3 pg  Mean Cell Hemoglobin Concentration : 32.6 gm/dL  Auto Neutrophil # : x  Auto Lymphocyte # : x  Auto Monocyte # : x  Auto Eosinophil # : x  Auto Basophil # : x  Auto Neutrophil % : x  Auto Lymphocyte % : x  Auto Monocyte % : x  Auto Eosinophil % : x  Auto Basophil % : x      07-14    143  |  110<H>  |  21  ----------------------------<  103<H>  See Note   |  18<L>  |  0.98    Ca    8.8      14 Jul 2022 05:58  Phos  2.9     07-14  Mg     2.5     07-14    TPro  5.6<L>  /  Alb  3.5  /  TBili  0.4  /  DBili  0.2  /  AST  28  /  ALT  22  /  AlkPhos  67  07-12            Radiology :    < from: CT Head No Cont (07.13.22 @ 11:30) >  ACC: [id number] EXAM:  CT BRAIN                          PROCEDURE DATE:  07/13/2022          INTERPRETATION:  INDICATIONS: Subarachnoid hemorrhage, follow-up    TECHNIQUE:  Serial axial images were obtained from the skull base to the   vertex without the use of intravenous contrast.    COMPARISON EXAMINATION: CT head dated July 12, 2022    FINDINGS:    VENTRICLES AND SULCI: Ventricles and parenchymal volume are unremarkable   for patient age. The bilateral lateral, third, and fourth ventricles are   stable in size compared to prior exam.  INTRA-AXIAL: There is an area of hypodensity in the caudal left   cerebellar hemisphere, which represent previously known chronic infarct.   There is no acute hemorrhage, midline shift, or mass.  EXTRA-AXIAL: There is a linear hyperdensity in the left sylvian fissure   and multiple other linear hyperintensities in the sulci of the left   parietal lobe, which represent previously known acute subarachnoid   hemorrhage that is slightly decreased when compared to prior exam. There   is a surrounding area of low density which appears to be medial to the   foci of hemorrhage, which likely represents edema, and is stable compared   to prior exam.  VISUALIZED SINUSES: No air-fluid levels are identified.  VISUALIZED MASTOIDS:  Clear.  CALVARIUM:  Intact. No evidence of acute fracture.  SOFT TISSUES: Unremarkable.    IMPRESSION: Minimal decrease in subarachnoid hemorrhage in the left   superior fissure and sulci of the left parietal lobe. No new hemorrhage   is identified.      < from: CT Brain Perfusion Maps Stroke (07.12.22 @ 11:27) >  ACC: [id number] EXAM:  CT BRAIN PERFUSION MAPS STROKE                          PROCEDURE DATE:  07/12/2022          INTERPRETATION:  PROCEDURE: CT Perfusion with intravenous contrast.    INDICATION: Aphasia; right hemiparesis    TECHNIQUE: Following the intravenous administration of 40 ml of Isovue   370, serial axial images were obtained through the brain. The CT   perfusion data set was post processed per iSchemaViewRAPID protocol   generating color maps of CBF, CBV, MTT, and Tmax.    COMPARISON: None    FINDINGS: The CT perfusion study demonstrates a large wedge shaped area   of elevated MTT involving the right frontal, temporal and parietal lobe   with corresponding decrease in both CBV and CBF. The calculated RAPID CBF   volume < 30% is 40 ml and a Tmax volume > 6 seconds is 176 ml. The   mismatch volume is 136 ml and mismatch ratio is 4.4.    IMPRESSION: Abnormal perfusion with RAPID calculated mismatch volume of   136 ml and mismatch ratio is 4.4 within the left hemisphere.      < from: CT Angio Neck Stroke Protocol w/ IV Cont (07.12.22 @ 11:26) >    ACC: [id number] EXAM:  CT ANGIO NECK STROKE PROT IC                        ACC: [id number] EXAM:  CT ANGIO BRAIN STROKE [facility]                          PROCEDURE DATE:  07/12/2022          INTERPRETATION:  PROCEDURE: CTA brain with and without intravenous   contrast.    INDICATION: Aphasia; right hemiparesis    TECHNIQUE: Multiple thin section axial images were obtained through the   [place] of [place] following the intravenous injection of 80 ml of   Isovue-370. Multiple 3-D reformatted images were generated from the axial   cuts.    COMPARISON: None    FINDINGS: The CTA examination demonstrates a abrupt cut off of the   midportion of the left M1 segment at the level of the origin of the left   anterior temporal artery (series 8 image 45) with lack of visualization   of the proximal left M2 segment. In addition, there is focal high-grade   narrowing involving the takeoff of the proximal anterior temporal artery   which appears to be supplying the inferior left temporal lobe.    The internal carotid arteries are normal in caliber. There is a normal   bifurcation into the A1 and M1 segments. There is a normal right MCA   bifurcation.    The vertebral arteries are normal in caliber. The basilar artery is   normal in caliber. There is a normal bifurcation into the posterior   cerebral arteries. The right P1 segment is hypoplastic with a prominent   right posterior communicating artery supplying the right PCA territory.   The left posterior communicating artery is also present.    Theabove findings were discussed with Dr. Raya at 11:52 AM.    IMPRESSION: Occlusion of the midportion of the left M1 segment to the   proximal M2 segment and high-grade narrowing involving the origin of the   left anterior temporal artery.      PROCEDURE: CTA neck with and without intravenous contrast.    INDICATION: Aphasia; right hemiparesis    TECHNIQUE: Multiple axial thin section were obtained through the neck   following the intravenous injection of 80 ml of Isovue-370. Multiple 3-D   reformatted images were generated from the axial cuts.    COMPARISON: None    FINDINGS: The CTA examination demonstrates the right common carotid   artery to be normal in caliber. There is a normal bifurcation into the   right internal and external carotidarteries. There is atherosclerotic   disease in calcification at right carotid bifurcation. There is no   hemodynamically significant stenosis.    The left common carotid artery is normal in caliber. There is a normal   bifurcation into the left internal and external carotid arteries. There   is atherosclerotic disease in calcification at the left carotid   bifurcation There is no hemodynamically significant stenosis.    The left vertebral artery originates at at the aortic arch. There is   calcification at the origin of right vertebral artery with mild to   moderate narrowing (Key image #1). There also is calcification involving   the V1 segments of the vertebral arteries bilaterally without significant   narrowing.    The aortic arch otherwise appears intact without narrowing of the origin   of the great vessels.    IMPRESSION: Focal area of mild to moderate narrowing involving the origin   of the right vertebral artery. No carotid stenosis.                  Vital Signs Last 24 Hrs  T(C): 37.3 (14 Jul 2022 05:09), Max: 37.3 (14 Jul 2022 05:09)  T(F): 99.2 (14 Jul 2022 05:09), Max: 99.2 (14 Jul 2022 05:09)  HR: 64 (14 Jul 2022 04:18) (64 - 85)  BP: 131/72 (14 Jul 2022 04:18) (127/86 - 174/88)  BP(mean): 95 (14 Jul 2022 04:18) (88 - 121)  RR: 18 (14 Jul 2022 04:18) (18 - 18)  SpO2: 97% (14 Jul 2022 04:18) (95% - 100%)    Parameters below as of 14 Jul 2022 04:18  Patient On (Oxygen Delivery Method): room air            REVIEW OF SYSTEMS:      CONSTITUTIONAL: No fever, weight loss, or fatigue  EYES: No eye pain, visual disturbances, or discharge  ENMT:  No difficulty hearing, tinnitus, vertigo; No sinus or throat pain  NECK: No pain or stiffness  BREASTS: No pain, masses, or nipple discharge  RESPIRATORY: No cough, wheezing, chills or hemoptysis; No shortness of breath  CARDIOVASCULAR: No chest pain, palpitations, dizziness, or leg swelling  GASTROINTESTINAL: No abdominal or epigastric pain. No nausea, vomiting, or hematemesis; No diarrhea or constipation. No melena or hematochezia.  GENITOURINARY: No dysuria, frequency, hematuria, or incontinence  NEUROLOGICAL:  per hpi  SKIN: No itching, burning, rashes, or lesions   LYMPH NODES: No enlarged glands  ENDOCRINE: No heat or cold intolerance; No hair loss  MUSCULOSKELETAL: No joint pain or swelling; No muscle, back, or extremity pain  PSYCHIATRIC: No depression, anxiety, mood swings, or difficulty sleeping  HEME/LYMPH: No easy bruising, or bleeding gums  ALLERGY AND IMMUNOLOGIC: No hives or eczema  VASCULAR: no swelling , erythema          Physical Exam:  on COVID isolation , 76 y o  man lying in semi Handley's position , awake , alert , no current complaints    Head : normocephalic , atraumatic    Eyes : PERRLA , EOMI , no nystagmus , sclera anicteric    ENT : nasal discharge , uvula midline , no oropharyngeal erythema / exudate    Neck : supple , negative JVD , negative carotid bruits , no thyromegaly    Chest : CTA bilaterally , neg wheeze / rhonchi / rales / crackles / egophany    Cardiovascular: regular rate and rhythm , neg murmurs / rubs / gallops    Abdomen : soft , non distended , non tender to palpation in all 4 quadrants , negative rebound / guarding , normal bowel sounds    Extremities : WWP , neg cyanosis /clubbing / edema , negative calf tenderness to palpation , negative Daniel's sign    Musculoskeletal :    Skin:      :     Neurologic Exam:    Alert and oriented to person , place , date/year, speech fluent w/o dysarthria , follows commands , recent and remote memory intact , repetition intact , comprehension intact ,  attention/concentration intact , fund of knowledge appropriate    Cranial Nerves:     II :                         pupils equal , round and reactive to light , visual fields intact   III/ IV/VI :              extraocular movements intact , neg nystagmus , neg ptosis  V :                        facial sensation intact , V1-3 normal  VII :                      face symmetric , no droop , normal eye closure and smile  VIII :                     hearing intact to finger rub bilaterally  IX and X :             no hoarseness , gag intact , palate/ uvula rise symmetrically  XI :                       SCM / trapezius strength intact bilateral  XII :                      no tongue deviation    Motor Exam:    Right UE:               no focal weakness ,  > 3+/5 throughout       : 5/5  wrist extensors/ flexors: 5/5  biceps :   5/5                    triceps :  5/5  deltoid :  5/5    negative pronator drift                               Left UE:                 no focal weakness,  > 3+/5 throughout       : 5/5  wrist extensors/ flexors : 5/5  biceps :   5/5                    triceps :  5/5  deltoid :  5/5    negative pronator drift        Right LE:                no focal weakness,  > 3+/5 throughout    dorsiflexors :  5/5  plantar flexors :  5/5  quadriceps :  5/5  hamstrings :  5/5  hip flexors :  5/5    Left LE:                  no focal weakness,  > 3+/5 throughout      dorsiflexors :  5/5  plantar flexors :  5/5  quadriceps :  5/5  hamstrings :  5/5  hip flexors :  5/5        Sensation:         intact to light touch x 4 extremities                         no neglect or extinction on double simultaneous testing                       DTR :                     biceps/brachioradialis : equal                                              patella/ankle : equal                                                                               neg Babinski    neg clonus        Coordination :      Finger to Nose :  neg dysmetria bilaterally                                   Heel to shin : wnl bilaterally                               Rapid Alternating movements :  neg dysdiadochokinesia bilaterally          Joint Position Sense : wnl bilaterally        Romberg :  not tested    Tandem Walking :  not tested    Gait :  not tested              PM&R Impression :     1) deconditioned    2) no focal weakness      Recommendations / Plan :     1) Physical / Occupational therapy focusing on therapeutic exercises , bed mobility/transfer out of bed evaluation , progressive ambulation with assistive       devices prn .    2) Anticipated Disposition Plan / Recs  :    subacute rehab placement                        Patient is a 76y old  Male who presents with a chief complaint of CVA     (13 Jul 2022 18:51)        HPI:  77 y/o male non-smoker w/ pmHx of Afib (on Xarelto), CVD (mid LAD, mid RCA, OM1, OM2, OM3 stents 0969-2213), HTN, HLD, GERD, and COVID infection 7/8/22 (tx w/ Paxlovid) presented to ED for unwitnessed fall around 10:15am 7/12/22. Patient's spouse states she heard a bang in a separate room at the house and found her  on the carpet. Patient was conscious, facedown, non-verbal, and moving only one of his arms (spouse could not recall right or left). No head trauma, LOC, or seizure activity was witnessed. This has never happened before.      Upon arrival to ED, patient had L gaze, aphasic, R sided plegia. CTH with hyperdense left MCA which was suspicious for thrombus and acute left MCA territory infarct. CTA with occlusion of the midportion of the left M1 segment to the proximal M2 segment. CTP with abnormal perfusion left MCA mismatch. BP in ED 170s/100.    Of note, wife reports they just got back from a 3 week trip from [facility] and were on a 12 hour flight on 7/7. Patient tested positive for COVID on 7/8 and was prescribed Paxlovid. Wife states patient usually takes his xarelto and other medications every morning around 9 or 10am, is unsure if he took them today. Patient is receiving care with cardiologist Dr. Brett Raya of [place] ([phone]). Patient is s/p mid LAD stent (April 2019), mid RCA stent (2016), and OM1-OM3 stents (April 2014).     Decision was made to take patient to thrombectomy with verbal consent from spouse. Patient was urgently taken to cath lab. (12 Jul 2022 13:03)      PAST MEDICAL & SURGICAL HISTORY:  Afib      HTN (hypertension)      GERD (gastroesophageal reflux disease)      HLD (hyperlipidemia)      CVD (cardiovascular disease)      S/P ablation of atrial fibrillation      Presence of stent in LAD coronary artery      S/P right coronary artery (RCA) stent placement      S/P arterial stent          MEDICATIONS  (STANDING):  atorvastatin 80 milliGRAM(s) Oral at bedtime  chlorhexidine 2% Cloths 1 Application(s) Topical <User Schedule>  enoxaparin Injectable 40 milliGRAM(s) SubCutaneous every 24 hours  insulin lispro (ADMELOG) corrective regimen sliding scale   SubCutaneous Before meals and at bedtime  melatonin 5 milliGRAM(s) Oral at bedtime  pantoprazole  Injectable 40 milliGRAM(s) IV Push daily    MEDICATIONS  (PRN):  acetaminophen     Tablet .. 650 milliGRAM(s) Oral every 6 hours PRN Temp greater or equal to 38C (100.4F), Mild Pain (1 - 3)  ondansetron Injectable 4 milliGRAM(s) IV Push every 6 hours PRN Nausea and/or Vomiting  senna 2 Tablet(s) Oral at bedtime PRN Constipation        Social History:                   -  Home Living Status:  lives with his wife and daughter in an elevator accessible apartment building         -  Prior Home Care Services :   none    Baseline Functional Level Prior to Admission :             - ADL's/ IADL's :  independent         - ambulatory status PTA :  walks without DME        FAMILY HISTORY:    CBC Full  -  ( 14 Jul 2022 05:58 )  WBC Count : 11.31 K/uL  RBC Count : 4.38 M/uL  Hemoglobin : 13.7 g/dL  Hematocrit : 42.0 %  Platelet Count - Automated : 218 K/uL  Mean Cell Volume : 95.9 fl  Mean Cell Hemoglobin : 31.3 pg  Mean Cell Hemoglobin Concentration : 32.6 gm/dL  Auto Neutrophil # : x  Auto Lymphocyte # : x  Auto Monocyte # : x  Auto Eosinophil # : x  Auto Basophil # : x  Auto Neutrophil % : x  Auto Lymphocyte % : x  Auto Monocyte % : x  Auto Eosinophil % : x  Auto Basophil % : x      07-14    143  |  110<H>  |  21  ----------------------------<  103<H>  See Note   |  18<L>  |  0.98    Ca    8.8      14 Jul 2022 05:58  Phos  2.9     07-14  Mg     2.5     07-14    TPro  5.6<L>  /  Alb  3.5  /  TBili  0.4  /  DBili  0.2  /  AST  28  /  ALT  22  /  AlkPhos  67  07-12            Radiology :    < from: CT Head No Cont (07.13.22 @ 11:30) >  ACC: [id number] EXAM:  CT BRAIN                          PROCEDURE DATE:  07/13/2022          INTERPRETATION:  INDICATIONS: Subarachnoid hemorrhage, follow-up    TECHNIQUE:  Serial axial images were obtained from the skull base to the   vertex without the use of intravenous contrast.    COMPARISON EXAMINATION: CT head dated July 12, 2022    FINDINGS:    VENTRICLES AND SULCI: Ventricles and parenchymal volume are unremarkable   for patient age. The bilateral lateral, third, and fourth ventricles are   stable in size compared to prior exam.  INTRA-AXIAL: There is an area of hypodensity in the caudal left   cerebellar hemisphere, which represent previously known chronic infarct.   There is no acute hemorrhage, midline shift, or mass.  EXTRA-AXIAL: There is a linear hyperdensity in the left sylvian fissure   and multiple other linear hyperintensities in the sulci of the left   parietal lobe, which represent previously known acute subarachnoid   hemorrhage that is slightly decreased when compared to prior exam. There   is a surrounding area of low density which appears to be medial to the   foci of hemorrhage, which likely represents edema, and is stable compared   to prior exam.  VISUALIZED SINUSES: No air-fluid levels are identified.  VISUALIZED MASTOIDS:  Clear.  CALVARIUM:  Intact. No evidence of acute fracture.  SOFT TISSUES: Unremarkable.    IMPRESSION: Minimal decrease in subarachnoid hemorrhage in the left   superior fissure and sulci of the left parietal lobe. No new hemorrhage   is identified.      < from: CT Brain Perfusion Maps Stroke (07.12.22 @ 11:27) >  ACC: [id number] EXAM:  CT BRAIN PERFUSION MAPS STROKE                          PROCEDURE DATE:  07/12/2022          INTERPRETATION:  PROCEDURE: CT Perfusion with intravenous contrast.    INDICATION: Aphasia; right hemiparesis    TECHNIQUE: Following the intravenous administration of 40 ml of Isovue   370, serial axial images were obtained through the brain. The CT   perfusion data set was post processed per iSchemaViewRAPID protocol   generating color maps of CBF, CBV, MTT, and Tmax.    COMPARISON: None    FINDINGS: The CT perfusion study demonstrates a large wedge shaped area   of elevated MTT involving the right frontal, temporal and parietal lobe   with corresponding decrease in both CBV and CBF. The calculated RAPID CBF   volume < 30% is 40 ml and a Tmax volume > 6 seconds is 176 ml. The   mismatch volume is 136 ml and mismatch ratio is 4.4.    IMPRESSION: Abnormal perfusion with RAPID calculated mismatch volume of   136 ml and mismatch ratio is 4.4 within the left hemisphere.      < from: CT Angio Neck Stroke Protocol w/ IV Cont (07.12.22 @ 11:26) >    ACC: [id number] EXAM:  CT ANGIO NECK STROKE PROT IC                        ACC: [id number] EXAM:  CT ANGIO BRAIN STROKE [facility]                          PROCEDURE DATE:  07/12/2022          INTERPRETATION:  PROCEDURE: CTA brain with and without intravenous   contrast.    INDICATION: Aphasia; right hemiparesis    TECHNIQUE: Multiple thin section axial images were obtained through the   [place] of [place] following the intravenous injection of 80 ml of   Isovue-370. Multiple 3-D reformatted images were generated from the axial   cuts.    COMPARISON: None    FINDINGS: The CTA examination demonstrates a abrupt cut off of the   midportion of the left M1 segment at the level of the origin of the left   anterior temporal artery (series 8 image 45) with lack of visualization   of the proximal left M2 segment. In addition, there is focal high-grade   narrowing involving the takeoff of the proximal anterior temporal artery   which appears to be supplying the inferior left temporal lobe.    The internal carotid arteries are normal in caliber. There is a normal   bifurcation into the A1 and M1 segments. There is a normal right MCA   bifurcation.    The vertebral arteries are normal in caliber. The basilar artery is   normal in caliber. There is a normal bifurcation into the posterior   cerebral arteries. The right P1 segment is hypoplastic with a prominent   right posterior communicating artery supplying the right PCA territory.   The left posterior communicating artery is also present.    Theabove findings were discussed with Dr. Raya at 11:52 AM.    IMPRESSION: Occlusion of the midportion of the left M1 segment to the   proximal M2 segment and high-grade narrowing involving the origin of the   left anterior temporal artery.      PROCEDURE: CTA neck with and without intravenous contrast.    INDICATION: Aphasia; right hemiparesis    TECHNIQUE: Multiple axial thin section were obtained through the neck   following the intravenous injection of 80 ml of Isovue-370. Multiple 3-D   reformatted images were generated from the axial cuts.    COMPARISON: None    FINDINGS: The CTA examination demonstrates the right common carotid   artery to be normal in caliber. There is a normal bifurcation into the   right internal and external carotidarteries. There is atherosclerotic   disease in calcification at right carotid bifurcation. There is no   hemodynamically significant stenosis.    The left common carotid artery is normal in caliber. There is a normal   bifurcation into the left internal and external carotid arteries. There   is atherosclerotic disease in calcification at the left carotid   bifurcation There is no hemodynamically significant stenosis.    The left vertebral artery originates at at the aortic arch. There is   calcification at the origin of right vertebral artery with mild to   moderate narrowing (Key image #1). There also is calcification involving   the V1 segments of the vertebral arteries bilaterally without significant   narrowing.    The aortic arch otherwise appears intact without narrowing of the origin   of the great vessels.    IMPRESSION: Focal area of mild to moderate narrowing involving the origin   of the right vertebral artery. No carotid stenosis.                  Vital Signs Last 24 Hrs  T(C): 37.3 (14 Jul 2022 05:09), Max: 37.3 (14 Jul 2022 05:09)  T(F): 99.2 (14 Jul 2022 05:09), Max: 99.2 (14 Jul 2022 05:09)  HR: 64 (14 Jul 2022 04:18) (64 - 85)  BP: 131/72 (14 Jul 2022 04:18) (127/86 - 174/88)  BP(mean): 95 (14 Jul 2022 04:18) (88 - 121)  RR: 18 (14 Jul 2022 04:18) (18 - 18)  SpO2: 97% (14 Jul 2022 04:18) (95% - 100%)    Parameters below as of 14 Jul 2022 04:18  Patient On (Oxygen Delivery Method): room air            REVIEW OF SYSTEMS:      CONSTITUTIONAL: No fever, weight loss, or fatigue  EYES: No eye pain, visual disturbances, or discharge  ENMT:  No difficulty hearing, tinnitus, vertigo; No sinus or throat pain  NECK: No pain or stiffness  BREASTS: No pain, masses, or nipple discharge  RESPIRATORY: No cough, wheezing, chills or hemoptysis; No shortness of breath  CARDIOVASCULAR: No chest pain, palpitations, dizziness, or leg swelling  GASTROINTESTINAL: No abdominal or epigastric pain. No nausea, vomiting, or hematemesis; No diarrhea or constipation. No melena or hematochezia.  GENITOURINARY: No dysuria, frequency, hematuria, or incontinence  NEUROLOGICAL:  per hpi  SKIN: No itching, burning, rashes, or lesions   LYMPH NODES: No enlarged glands  ENDOCRINE: No heat or cold intolerance; No hair loss  MUSCULOSKELETAL: No joint pain or swelling; No muscle, back, or extremity pain  PSYCHIATRIC: No depression, anxiety, mood swings, or difficulty sleeping  HEME/LYMPH: No easy bruising, or bleeding gums  ALLERGY AND IMMUNOLOGIC: No hives or eczema  VASCULAR: no swelling , erythema          Physical Exam:  on COVID isolation , 76 y o  man lying in semi Handley's position , awake , alert ,       Neurologic Exam:    Alert and oriented to person , place , , speech fluent w/ slight dysarthria , exp> receptive aphasia, follows 1-2 step commands      Cranial Nerves:     II :                         pupils equal , round and reactive to light , visual fields intact   III/ IV/VI :              extraocular movements intact , neg nystagmus , neg ptosis  V :                        facial sensation intact , V1-3 normal  VII :                      rightdroop , normal eye closure  VIII :                     hearing intact to finger rub bilaterally  IX and X :             no hoarseness , gag intact , palate/ uvula rise symmetrically  XI :                       SCM / trapezius strength intact bilateral  XII :                      no tongue deviation    Motor Exam:    Right UE:           4- to 4 range    pos pronator drift                               Left UE:              5/5    negative pronator drift        Right LE:            4- to 4 range      Left LE:             5/5      Sensation:         intact to light touch x 4 extremities                         no neglect or extinction on double simultaneous testing                       DTR :                     biceps/brachioradialis : equal                                              patella/ankle : equal                                                                               neg Babinski          Coordination :      Finger to Nose :  slight right dysmetria        Gait :  not tested              PM&R Impression :     1) acute L MCA distribution infarct      Recommendations / Plan :     1) Physical / Occupational therapy focusing on therapeutic exercises , bed mobility/transfer out of bed evaluation , progressive ambulation with assistive       devices prn .    2) Anticipated Disposition Plan / Recs  :    acute rehab placement

## 2022-07-14 NOTE — SPEECH LANGUAGE PATHOLOGY EVALUATION - WORDS:
4/6 monosyllabic words; errors characterized by sound substitution (e.g. "sen"/sun, "viktoriya"/bed).

## 2022-07-14 NOTE — SPEECH LANGUAGE PATHOLOGY EVALUATION - SLP PERTINENT HISTORY OF CURRENT PROBLEM
PMHx significant for Afib, HTN, GERD, and COVID 7/8/22 (tx with Paxlovid). Presented to ED by EMS with L gaze, aphasia, R sided plegia. CTH showed hyperdense L MCA sign, confirmed on CTA. CTP showed signifcant mismatch. S/p thrombectomy. PMHx significant for Afib, HTN, GERD, and COVID 7/8/22 (tx with Paxlovid). Presented to ED by EMS with L gaze, aphasia, R sided plegia. CTH showed hyperdense L MCA sign, confirmed on CTA. CTP showed significant mismatch. S/p thrombectomy.

## 2022-07-14 NOTE — SPEECH LANGUAGE PATHOLOGY EVALUATION - SLP REPETITION
3/6 single words-mono and bi-syllabic (e.g. of errors: "br-ai-n"/brown, "mahi-o"/window). Significant increase in sound errors (substitutions, omissions) during attempts at sentence repetition ("Bais are stabul"/Limes are sour)./impaired/word/phrase/sentence

## 2022-07-14 NOTE — SPEECH LANGUAGE PATHOLOGY EVALUATION - CONFRONTATIONAL NAMING
2/8 given multi-modal cueing. Errors characterized as neologisms (e.g. "comped"/cup, "baushe"/bed, "lyudmila-red"/telephone, "shom"/chair, "tearder"/tissue)/impaired 2/8 given multi-modal cueing. Errors characterized as neologisms (e.g. "comped"/cup, "baushe"/bed, "lyudmila-red"/telephone, "shom"/chair, "tearder"/tissue). Multiple attempts to self-cue with written expression./impaired

## 2022-07-14 NOTE — PROGRESS NOTE ADULT - ASSESSMENT
76y Male with PMHx of Afib (on Xarelto), CVD (mid LAD, mid RCA, OM1, OM2, OM3 stents [id number]), HTN, HLD, GERD, and COVID infection 7/8/22 (tx w/ Paxlovid) presents to ED by EMS, found to be a thrombectomy case with L gaze, aphasia, R sided plegia. CTH showed hyperdense L MCA sign, confirmed on CTA. CTP showed significant mismatch. Patient was not a tpa candidate as he had likely taken Xeralto within 3 hours prior to arrival, was on Paxlovid + Xeralto which would elevate bleeding risk regardless. Patient deemed appropriate candidate for thrombectomy and transferred to IR suite for intervention- TICI3 achieved. Of note, L ICA dissection (non-occlusive) was noted - unclear if cause of stroke or iatrogenic- will need f/u. Exam improving. CTH with SAH vs. contrast extravasation on L sylvian fissure. Holding home xarelto for now. Stable for step down to 7 [facility] for continuation of stroke workup.    Neuro  #CVA workup  - holding home xarelto for now  - continue atorvastatin 80mg daily   - q4hr stroke neuro checks and vitals  - Stroke Code HCT Results: Hyperdense left MCA which is suspicious for thrombus and acute left MCA territory infarct.  - Stroke Code CTA Results: Occlusion of the midportion of the left M1 segment to the proximal M2 segment and high-grade narrowing involving the origin of the left anterior temporal artery. Focal area of mild to moderate narrowing involving the origin of the right vertebral artery. No carotid stenosis.  - repeat CTH with SAH vs. contrast extravasation on L sylvian fissure.  - f/u MRI w/o contrast with GRE  - PT/OT/SLP  - Stroke education    Cards  #HTN  - -160  - restarted half of home metop dose - 12.5mg metoprolol succinate QD (takes it BID at home).   - echo:  Limited study obtained for evaluation of left ventricular function. Normal left and right ventricular size and systolic function. Mildly dilated left atrium. No pericardial effusion. The left ventricle is normal in size and systolic function with a calculated ejection fraction of 65%.      #HLD  - high dose statin as above in CVA  - LDL results: 54    Pulm  - call provider if SPO2 < 94%    GI  #Nutrition/Fluids/Electrolytes   - replete K<4 and Mg <2  - Diet: regular  - cont home PPI    Renal  - daily BMP    Infectious Disease  COVID + 7/8, started on paxlovid OSH  - afebrile  - COVID+, isolation precautions  - CXR: Heart size within normal limits, thoracic aortic calcification. Lungs and mediastinum are unremarkable. Thoracic spine degenerative changes, dextroscoliosis. Elevation of the right hemidiaphragm.    Endocrine  - A1C results: 5.6    - TSH results: 0.975    DVT Prophylaxis  - SCDs and lovenox (started 7/13) for DVT prophylaxis   - LE dopplers negative    Dispo: recommended for acute rehab. Since his initial COVID test was an at home rapid, 10 day isolation period required for transfer to acute rehab began on 7/12 from positive in-house PCR test. Will need to remain in isolation until at least 7/22.     PT/OT: AR     Discussed daily hospital plans and goals with patient and family at bedside.    Discussed with Neurology Attending Dr. Raya

## 2022-07-14 NOTE — CONSULT NOTE ADULT - ASSESSMENT
per Neurology    76 y o Male with PMHx of Afib (on Xarelto), CVD (mid LAD, mid RCA, OM1, OM2, OM3 stents [id number]), HTN, HLD, GERD, and COVID infection 7/8/22 (tx w/ Paxlovid) presents to ED by EMS, found to be a thrombectomy case with L gaze, aphasia, R sided plegia. CTH showed hyperdense L MCA sign, confirmed on CTA. CTP showed significant mismatch. Patient was not a tpa candidate as he had likely taken Xeralto within 3 hours prior to arrival, was on Paxlovid + Xeralto which would elevate bleeding risk regardless. Patient deemed appropriate candidate for thrombectomy and transferred to IR suite for intervention- TICI3 achieved. Of note, L ICA dissection (non-occlusive) was noted - unclear if cause of stroke or iatrogenic- will need f/u. Exam improving. CTH with SAH vs. contrast extravasation on L sylvian fissure. Holding home xarelto for now. Stable for step down to 7 [facility] for continuation of stroke workup.    Neuro  #CVA workup  - holding home xarelto for now  - continue atorvastatin 80mg daily   - q4hr stroke neuro checks and vitals  - Stroke Code HCT Results: Hyperdense left MCA which is suspicious for thrombus and acute left MCA territory infarct.  - Stroke Code CTA Results: Occlusion of the midportion of the left M1 segment to the proximal M2 segment and high-grade narrowing involving the origin of the left anterior temporal artery. Focal area of mild to moderate narrowing involving the origin of the right vertebral artery. No carotid stenosis.  - repeat CTH with SAH vs. contrast extravasation on L sylvian fissure.  - f/u MRI non con with GRE  - PT/OT/SLP  - Stroke education    Cards  #HTN  - -160  - hold off on starting home BP meds   - echo:  Limited study obtained for evaluation of left ventricular function. Normal left and right ventricular size and systolic function. Mildly dilated left atrium. No pericardial effusion. The left ventricle is normal in size and systolic function with a calculated ejection fraction of 65%.      #HLD  - high dose statin as above in CVA  - LDL results: 54    Pulm  - call provider if SPO2 < 94%    GI  #Nutrition/Fluids/Electrolytes   - replete K<4 and Mg <2  - Diet: regular  - cont home PPI    Renal  - BUN 24/Cr 0.94  - trend renal function    Infectious Disease  COVID + 7/8, started on paxlovid OSH  - afebrile  - COVID+, isolation precautions  - CXR: Heart size within normal limits, thoracic aortic   calcification. Lungs and mediastinum are unremarkable. Thoracic spine   degenerative changes, dextroscoliosis. Elevation of the right   hemidiaphragm.    Endocrine  - A1C results: 5.6  - euglycemic goal  - TSH results: 0.975    DVT Prophylaxis  - SCDs and lovenox (started 7/13) for DVT prophylaxis   - pend LE dopplers    Dispo: transfer to stroke  PT/OT: AR

## 2022-07-14 NOTE — CONSULT NOTE ADULT - SUBJECTIVE AND OBJECTIVE BOX
`MEDICINE CO-MANAGEMENT CONSULT NOTE    HPI:  77 y/o male non-smoker w/ pmHx of Afib (on Xarelto), CAD (mid LAD, mid RCA, OM1, OM2, OM3 stents 6001-9255), HTN, HLD, GERD, and COVID infection 7/8/22 (tx w/ Paxlovid) presented to ED for unwitnessed fall around 10:15am 7/12/22. Patient's spouse states she heard a bang in a separate room at the house and found her  on the carpet. Patient was conscious, facedown, non-verbal, and moving only one of his arms (spouse could not recall right or left). No head trauma, LOC, or seizure activity was witnessed. This has never happened before.      Upon arrival to ED, patient had L gaze, aphasic, R sided plegia. CTH with hyperdense left MCA which was suspicious for thrombus and acute left MCA territory infarct. CTA with occlusion of the midportion of the left M1 segment to the proximal M2 segment. CTP with abnormal perfusion left MCA mismatch. BP in ED 170s/100.    Of note, wife reports they just got back from a 3 week trip from [facility] and were on a 12 hour flight on 7/7. Patient tested positive for COVID on 7/8 and was prescribed Paxlovid. Wife states patient usually takes his xarelto and other medications every morning around 9 or 10am, is unsure if he took them today. Patient is receiving care with cardiologist Dr. Brett Raya of [place] ([phone]). Patient is s/p mid LAD stent (April 2019), mid RCA stent (2016), and OM1-OM3 stents (April 2014).     Decision was made to take patient to thrombectomy with verbal consent from spouse. Patient was urgently taken to cath lab. (12 Jul 2022 13:03)      PAST MEDICAL & SURGICAL HISTORY:  Afib  HTN (hypertension)      GERD (gastroesophageal reflux disease)      HLD (hyperlipidemia)      CVD (cardiovascular disease)      S/P ablation of atrial fibrillation      Presence of stent in LAD coronary artery      S/P right coronary artery (RCA) stent placement      S/P arterial stent          Home Meds: Home Medications:  atorvastatin 40 mg oral tablet: 1 tab(s) orally once a day (12 Jul 2022 12:18)  famotidine 40 mg oral tablet: 1 tab(s) orally once a day (at bedtime) (12 Jul 2022 12:18)  losartan 100 mg oral tablet: 1 tab(s) orally once a day (12 Jul 2022 12:18)  metoprolol succinate 25 mg oral tablet, extended release: 0.5 tab(s) orally 2 times a day (12 Jul 2022 12:18)  nirmatrelvir-ritonavir 150 mg-100 mg (300 mg-100 mg Dose) oral tablet: 3 tab(s) orally 2 times a day (12 Jul 2022 12:18)  pantoprazole 40 mg oral delayed release tablet: 1 tab(s) orally once a day (12 Jul 2022 12:18)  rivaroxaban 20 mg oral tablet: 1 tab(s) orally once a day (in the evening) (12 Jul 2022 12:18)      FAMILY HISTORY:      Social History:      Allergies    penicillin (Unknown)    Intolerances        REVIEW OF SYSTEMS      General:	    Skin/Breast:  	  Ophthalmologic:  	  ENMT:	    Respiratory and Thorax:  	  Cardiovascular:	    Gastrointestinal:	    Genitourinary:	    Musculoskeletal:	    Neurological:	    Psychiatric:	    Hematology/Lymphatics:	    Endocrine:	    Allergic/Immunologic:	    PHYSICAL EXAM:  General: NAD  HENMT: +MMM, no LAD  RESPIRATORY: no increased WOB, CTAB  CVS: RRR, no m/r/g, extremities warm and well perfused, DP 2+ bilaterally  ABD: +BS, NT/ND  EXT: no pitting edema  Neuro: alert and oriented to person, place, time and situation. CN II-XII intact, 5/5 motor upper and lower extremity strength bilaterally. Sensation grossly equal and intact in upper and lower [id number]    Labs:  CAPILLARY BLOOD GLUCOSE      POCT Blood Glucose.: 83 mg/dL (14 Jul 2022 06:31)  POCT Blood Glucose.: 102 mg/dL (13 Jul 2022 21:53)  POCT Blood Glucose.: 116 mg/dL (13 Jul 2022 17:23)  POCT Blood Glucose.: 108 mg/dL (13 Jul 2022 12:13)                          13.7   11.31 )-----------( 218      ( 14 Jul 2022 05:58 )             42.0         07-14    143  |  110<H>  |  21  ----------------------------<  103<H>  See Note   |  18<L>  |  0.98      Calcium, Total Serum: 8.8 mg/dL (07-14-22 @ 05:58)      LFTs:             5.6  | 0.4  | 28       ------------------[67      ( 12 Jul 2022 19:52 )  3.5  | 0.2  | 22          Lipase:x      Amylase:x         Lactate, Blood: 1.4 mmol/L (07-12-22 @ 19:52)      Coags:     12.6   ----< 1.06    ( 12 Jul 2022 19:52 )     26.1        CARDIAC MARKERS ( 12 Jul 2022 19:52 )  x     / 0.01 ng/mL / x     / x     / x      CARDIAC MARKERS ( 12 Jul 2022 11:06 )  x     / <0.01 ng/mL / x     / x     / x                      IMAGING   `MEDICINE CO-MANAGEMENT CONSULT NOTE    HPI:  77 y/o male non-smoker w/ pmHx of Afib (on Xarelto), CAD (mid LAD, mid RCA, OM1, OM2, OM3 stents 8963-4021), HTN, HLD, GERD, and COVID infection 7/8/22 (tx w/ Paxlovid) presented to ED for unwitnessed fall around 10:15am 7/12/22. Patient's spouse states she heard a bang in a separate room at the house and found her  on the carpet. Patient was conscious, facedown, non-verbal, and moving only one of his arms (spouse could not recall right or left). No head trauma, LOC, or seizure activity was witnessed. This has never happened before.      Upon arrival to ED, patient had L gaze, aphasic, R sided plegia. CTH with hyperdense left MCA which was suspicious for thrombus and acute left MCA territory infarct. CTA with occlusion of the midportion of the left M1 segment to the proximal M2 segment. CTP with abnormal perfusion left MCA mismatch. BP in ED 170s/100.    Of note, wife reports they just got back from a 3 week trip from [facility] and were on a 12 hour flight on 7/7. Patient tested positive for COVID on 7/8 and was prescribed Paxlovid. Wife states patient usually takes his xarelto and other medications every morning around 9 or 10am, is unsure if he took them today. Patient is receiving care with cardiologist Dr. Brett Raya of [place] ([phone]). Patient is s/p mid LAD stent (April 2019), mid RCA stent (2016), and OM1-OM3 stents (April 2014).     Decision was made to take patient to thrombectomy with verbal consent from spouse. Patient was urgently taken to cath lab. (12 Jul 2022 13:03)      PAST MEDICAL & SURGICAL HISTORY:  Afib  HTN (hypertension)  GERD (gastroesophageal reflux disease)  HLD (hyperlipidemia)  CVD (cardiovascular disease)  S/P ablation of atrial fibrillation  Presence of stent in LAD coronary artery  S/P right coronary artery (RCA) stent placement  S/P arterial stent      Home Meds: Home Medications:  atorvastatin 40 mg oral tablet: 1 tab(s) orally once a day (12 Jul 2022 12:18)  famotidine 40 mg oral tablet: 1 tab(s) orally once a day (at bedtime) (12 Jul 2022 12:18)  losartan 100 mg oral tablet: 1 tab(s) orally once a day (12 Jul 2022 12:18)  metoprolol succinate 25 mg oral tablet, extended release: 0.5 tab(s) orally 2 times a day (12 Jul 2022 12:18)  nirmatrelvir-ritonavir 150 mg-100 mg (300 mg-100 mg Dose) oral tablet: 3 tab(s) orally 2 times a day (12 Jul 2022 12:18)  pantoprazole 40 mg oral delayed release tablet: 1 tab(s) orally once a day (12 Jul 2022 12:18)  rivaroxaban 20 mg oral tablet: 1 tab(s) orally once a day (in the evening) (12 Jul 2022 12:18)      FAMILY HISTORY:      Social History:      Allergies    penicillin (Unknown)    Intolerances        REVIEW OF SYSTEMS      PHYSICAL EXAM:  General: NAD  HENMT: +MMM, no LAD  RESPIRATORY: no increased WOB, CTAB  CVS: RRR, no m/r/g, extremities warm and well perfused, DP 2+ bilaterally  ABD: +BS, NT/ND  EXT: no pitting edema  Neuro: alert and oriented to person, place, time and situation. CN II-XII intact, 5/5 motor upper and lower extremity strength bilaterally. Sensation grossly equal and intact in upper and lower [id number]    Labs:  CAPILLARY BLOOD GLUCOSE      POCT Blood Glucose.: 83 mg/dL (14 Jul 2022 06:31)  POCT Blood Glucose.: 102 mg/dL (13 Jul 2022 21:53)  POCT Blood Glucose.: 116 mg/dL (13 Jul 2022 17:23)  POCT Blood Glucose.: 108 mg/dL (13 Jul 2022 12:13)                          13.7   11.31 )-----------( 218      ( 14 Jul 2022 05:58 )             42.0         07-14    143  |  110<H>  |  21  ----------------------------<  103<H>  See Note   |  18<L>  |  0.98      Calcium, Total Serum: 8.8 mg/dL (07-14-22 @ 05:58)      LFTs:             5.6  | 0.4  | 28       ------------------[67      ( 12 Jul 2022 19:52 )  3.5  | 0.2  | 22          Lipase:x      Amylase:x         Lactate, Blood: 1.4 mmol/L (07-12-22 @ 19:52)      Coags:     12.6   ----< 1.06    ( 12 Jul 2022 19:52 )     26.1        CARDIAC MARKERS ( 12 Jul 2022 19:52 )  x     / 0.01 ng/mL / x     / x     / x      CARDIAC MARKERS ( 12 Jul 2022 11:06 )  x     / <0.01 ng/mL / x     / x     / x                      IMAGING  < from: CT Head No Cont (07.13.22 @ 11:30) >  IMPRESSION: Minimal decrease in subarachnoid hemorrhage in the left   superior fissure and sulci of the left parietal lobe. No new hemorrhage   is identified.    --- End of Report ---    < end of copied text >  < from: CT Head No Cont (07.12.22 @ 20:27) >    There has been interval appearance of acute subarachnoid hemorrhage   within the posterior lateral sulcus of the sylvian fissure and left   parietal sulci. There is a small amount of edema seen within the left   posterior insula (image #13 through 16 series 2). 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 There is no   evidence of mass-effect or midline shift. There is no evidence of an   intra  -axial fluid collection.    Visualized paranasal sinuses and bilateral mastoid air cells are clear.    Impression: Interval appearance of left sylvian fissure and no lesion   parietal subarachnoid hemorrhage.    --- End of Report ---    < end of copied text >  < from: CT Brain Stroke Protocol (07.12.22 @ 11:28) >  IMPRESSION: Hyperdense left MCA which is suspicious for thrombus and   acute left MCA territory infarct.    --- End of Report ---    < end of copied text >  < from: CT Brain Perfusion Maps Stroke (07.12.22 @ 11:27) >    IMPRESSION: Abnormal perfusion with RAPID calculated mismatch volume of   136 ml and mismatch ratio is 4.4 within the left hemisphere.    --- End of Report ---    < end of copied text >  < from: CT Angio Neck Stroke Protocol w/ IV Cont (07.12.22 @ 11:26) >  IMPRESSION: Occlusion of the midportion of the left M1 segment to the   proximal M2 segment and high-grade narrowing involving the origin of the   left anterior temporal artery.      < end of copied text >  < from: CT Angio Brain Stroke Protocol  w/ IV Cont (07.12.22 @ 11:26) >  IMPRESSION: Occlusion of the midportion of the left M1 segment to the   proximal M2 segment and high-grade narrowing involving the origin of the   left anterior temporal artery.    < end of copied text >   `MEDICINE CO-MANAGEMENT CONSULT NOTE    HPI:  77 y/o male non-smoker w/ pmHx of Afib (on Xarelto), CAD (mid LAD, mid RCA, OM1, OM2, OM3 stents 8167-9643), HTN, HLD, GERD, and COVID infection 7/8/22 (tx w/ Paxlovid) presented to ED for unwitnessed fall around 10:15am 7/12/22. Patient's spouse states she heard a bang in a separate room at the house and found her  on the carpet. Patient was conscious, facedown, non-verbal, and moving only one of his arms (spouse could not recall right or left). No head trauma, LOC, or seizure activity was witnessed. This has never happened before.      Upon arrival to ED, patient had L gaze, aphasic, R sided plegia. CTH with hyperdense left MCA which was suspicious for thrombus and acute left MCA territory infarct. CTA with occlusion of the midportion of the left M1 segment to the proximal M2 segment. CTP with abnormal perfusion left MCA mismatch. BP in ED 170s/100.    Of note, wife reports they just got back from a 3 week trip from [facility] and were on a 12 hour flight on 7/7. Patient tested positive for COVID on 7/8 and was prescribed Paxlovid. Wife states patient usually takes his xarelto and other medications every morning around 9 or 10am, is unsure if he took them today. Patient is receiving care with cardiologist Dr. Brett Raya of [place] ([phone]). Patient is s/p mid LAD stent (April 2019), mid RCA stent (2016), and OM1-OM3 stents (April 2014).     Decision was made to take patient to thrombectomy with verbal consent from spouse. Patient was urgently taken to cath lab. (12 Jul 2022 13:03)    Information above was verified, patient stopped taking xarelto while on paxlovid; and given he had an ablation in the past he was comfortable with stopping his     PAST MEDICAL & SURGICAL HISTORY:  Afib  HTN (hypertension)  GERD (gastroesophageal reflux disease)  HLD (hyperlipidemia)  CVD (cardiovascular disease)  S/P ablation of atrial fibrillation  Presence of stent in LAD coronary artery  S/P right coronary artery (RCA) stent placement  S/P arterial stent      Home Meds: Home Medications:  atorvastatin 40 mg oral tablet: 1 tab(s) orally once a day (12 Jul 2022 12:18)  famotidine 40 mg oral tablet: 1 tab(s) orally once a day (at bedtime) (12 Jul 2022 12:18)  losartan 100 mg oral tablet: 1 tab(s) orally once a day (12 Jul 2022 12:18)  metoprolol succinate 25 mg oral tablet, extended release: 0.5 tab(s) orally 2 times a day (12 Jul 2022 12:18)  nirmatrelvir-ritonavir 150 mg-100 mg (300 mg-100 mg Dose) oral tablet: 3 tab(s) orally 2 times a day (12 Jul 2022 12:18)  pantoprazole 40 mg oral delayed release tablet: 1 tab(s) orally once a day (12 Jul 2022 12:18)  rivaroxaban 20 mg oral tablet: 1 tab(s) orally once a day (in the evening) (12 Jul 2022 12:18)      FAMILY HISTORY: no family hx of stroke      Social History: denies smoking, occasional alcohol use, no illicit drug use      Allergies    penicillin (Unknown)    Intolerances        REVIEW OF SYSTEMS  +word finding difficulty  denies fevers/chills/night sweats  +slurred speech  r sided weakness    all other systems reviewed and negative        PHYSICAL EXAM:  General: NAD  HENMT: +MMM, no LAD  RESPIRATORY: no increased WOB, CTAB  CVS: RRR, no m/r/g, extremities warm and well perfused, DP 2+ bilaterally  ABD: +BS, NT/ND  EXT: no pitting edema  Neuro: alert and oriented to person, place, time and situation. CN II-XII intact, 5/5 motor upper and lower extremity strength bilaterally. Sensation grossly equal and intact in upper and lower [id number]    Labs:  CAPILLARY BLOOD GLUCOSE      POCT Blood Glucose.: 83 mg/dL (14 Jul 2022 06:31)  POCT Blood Glucose.: 102 mg/dL (13 Jul 2022 21:53)  POCT Blood Glucose.: 116 mg/dL (13 Jul 2022 17:23)  POCT Blood Glucose.: 108 mg/dL (13 Jul 2022 12:13)                          13.7   11.31 )-----------( 218      ( 14 Jul 2022 05:58 )             42.0         07-14    143  |  110<H>  |  21  ----------------------------<  103<H>  See Note   |  18<L>  |  0.98      Calcium, Total Serum: 8.8 mg/dL (07-14-22 @ 05:58)      LFTs:             5.6  | 0.4  | 28       ------------------[67      ( 12 Jul 2022 19:52 )  3.5  | 0.2  | 22          Lipase:x      Amylase:x         Lactate, Blood: 1.4 mmol/L (07-12-22 @ 19:52)      Coags:     12.6   ----< 1.06    ( 12 Jul 2022 19:52 )     26.1        CARDIAC MARKERS ( 12 Jul 2022 19:52 )  x     / 0.01 ng/mL / x     / x     / x      CARDIAC MARKERS ( 12 Jul 2022 11:06 )  x     / <0.01 ng/mL / x     / x     / x          IMAGING  < from: CT Head No Cont (07.13.22 @ 11:30) >  IMPRESSION: Minimal decrease in subarachnoid hemorrhage in the left   superior fissure and sulci of the left parietal lobe. No new hemorrhage   is identified.    --- End of Report ---    < end of copied text >  < from: CT Head No Cont (07.12.22 @ 20:27) >    There has been interval appearance of acute subarachnoid hemorrhage   within the posterior lateral sulcus of the sylvian fissure and left   parietal sulci. There is a small amount of edema seen within the left   posterior insula (image #13 through 16 series 2). 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 There is no   evidence of mass-effect or midline shift. There is no evidence of an   intra  -axial fluid collection.    Visualized paranasal sinuses and bilateral mastoid air cells are clear.    Impression: Interval appearance of left sylvian fissure and no lesion   parietal subarachnoid hemorrhage.    --- End of Report ---    < end of copied text >  < from: CT Brain Stroke Protocol (07.12.22 @ 11:28) >  IMPRESSION: Hyperdense left MCA which is suspicious for thrombus and   acute left MCA territory infarct.    --- End of Report ---    < end of copied text >  < from: CT Brain Perfusion Maps Stroke (07.12.22 @ 11:27) >    IMPRESSION: Abnormal perfusion with RAPID calculated mismatch volume of   136 ml and mismatch ratio is 4.4 within the left hemisphere.    --- End of Report ---    < end of copied text >  < from: CT Angio Neck Stroke Protocol w/ IV Cont (07.12.22 @ 11:26) >  IMPRESSION: Occlusion of the midportion of the left M1 segment to the   proximal M2 segment and high-grade narrowing involving the origin of the   left anterior temporal artery.      < end of copied text >  < from: CT Angio Brain Stroke Protocol  w/ IV Cont (07.12.22 @ 11:26) >  IMPRESSION: Occlusion of the midportion of the left M1 segment to the   proximal M2 segment and high-grade narrowing involving the origin of the   left anterior temporal artery.    < end of copied text >   `MEDICINE CO-MANAGEMENT CONSULT NOTE    HPI:  77 y/o male non-smoker w/ pmHx of Afib (on Xarelto), CAD (mid LAD, mid RCA, OM1, OM2, OM3 stents 0831-4957), HTN, HLD, GERD, and COVID infection 7/8/22 (tx w/ Paxlovid) presented to ED for unwitnessed fall around 10:15am 7/12/22. Patient's spouse states she heard a bang in a separate room at the house and found her  on the carpet. Patient was conscious, facedown, non-verbal, and moving only one of his arms (spouse could not recall right or left). No head trauma, LOC, or seizure activity was witnessed. This has never happened before.      Upon arrival to ED, patient had L gaze, aphasic, R sided plegia. CTH with hyperdense left MCA which was suspicious for thrombus and acute left MCA territory infarct. CTA with occlusion of the midportion of the left M1 segment to the proximal M2 segment. CTP with abnormal perfusion left MCA mismatch. BP in ED 170s/100.    Of note, wife reports they just got back from a 3 week trip from [facility] and were on a 12 hour flight on 7/7. Patient tested positive for COVID on 7/8 and was prescribed Paxlovid. Wife states patient usually takes his xarelto and other medications every morning around 9 or 10am, is unsure if he took them today. Patient is receiving care with cardiologist Dr. Brett Raya of [place] ([phone]). Patient is s/p mid LAD stent (April 2019), mid RCA stent (2016), and OM1-OM3 stents (April 2014).     Decision was made to take patient to thrombectomy with verbal consent from spouse. Patient was urgently taken to cath lab. (12 Jul 2022 13:03)    Information above was verified, patient stopped taking xarelto while on paxlovid; and given he had an ablation in the past he was comfortable with stopping his     PAST MEDICAL & SURGICAL HISTORY:  Afib  HTN (hypertension)  GERD (gastroesophageal reflux disease)  HLD (hyperlipidemia)  CVD (cardiovascular disease)  S/P ablation of atrial fibrillation  Presence of stent in LAD coronary artery  S/P right coronary artery (RCA) stent placement  S/P arterial stent      Home Meds: Home Medications:  atorvastatin 40 mg oral tablet: 1 tab(s) orally once a day (12 Jul 2022 12:18)  famotidine 40 mg oral tablet: 1 tab(s) orally once a day (at bedtime) (12 Jul 2022 12:18)  losartan 100 mg oral tablet: 1 tab(s) orally once a day (12 Jul 2022 12:18)  metoprolol succinate 25 mg oral tablet, extended release: 0.5 tab(s) orally 2 times a day (12 Jul 2022 12:18)  nirmatrelvir-ritonavir 150 mg-100 mg (300 mg-100 mg Dose) oral tablet: 3 tab(s) orally 2 times a day (12 Jul 2022 12:18)  pantoprazole 40 mg oral delayed release tablet: 1 tab(s) orally once a day (12 Jul 2022 12:18)  rivaroxaban 20 mg oral tablet: 1 tab(s) orally once a day (in the evening) (12 Jul 2022 12:18)      FAMILY HISTORY: no family hx of stroke      Social History: denies smoking, occasional alcohol use, no illicit drug use      Allergies    penicillin (Unknown)    Intolerances        REVIEW OF SYSTEMS  +word finding difficulty  denies fevers/chills/night sweats  +slurred speech  r sided weakness    all other systems reviewed and negative        PHYSICAL EXAM:  General: NAD  HENMT: +MMM, no LAD  RESPIRATORY: no increased WOB, CTAB  CVS: RRR, no m/r/g, extremities warm and well perfused, DP 2+ bilaterally  ABD: +BS, NT/ND  EXT: no pitting edema  Neuro: alert and oriented to person, place, time and situation. CN II-XII intact however some dysarthria; bilateral upper and lower extremity strength bilaterally. Sensation grossly equal and intact in upper and lower [id number]    Labs:  CAPILLARY BLOOD GLUCOSE      POCT Blood Glucose.: 83 mg/dL (14 Jul 2022 06:31)  POCT Blood Glucose.: 102 mg/dL (13 Jul 2022 21:53)  POCT Blood Glucose.: 116 mg/dL (13 Jul 2022 17:23)  POCT Blood Glucose.: 108 mg/dL (13 Jul 2022 12:13)                          13.7   11.31 )-----------( 218      ( 14 Jul 2022 05:58 )             42.0         07-14    143  |  110<H>  |  21  ----------------------------<  103<H>  See Note   |  18<L>  |  0.98      Calcium, Total Serum: 8.8 mg/dL (07-14-22 @ 05:58)      LFTs:             5.6  | 0.4  | 28       ------------------[67      ( 12 Jul 2022 19:52 )  3.5  | 0.2  | 22          Lipase:x      Amylase:x         Lactate, Blood: 1.4 mmol/L (07-12-22 @ 19:52)      Coags:     12.6   ----< 1.06    ( 12 Jul 2022 19:52 )     26.1        CARDIAC MARKERS ( 12 Jul 2022 19:52 )  x     / 0.01 ng/mL / x     / x     / x      CARDIAC MARKERS ( 12 Jul 2022 11:06 )  x     / <0.01 ng/mL / x     / x     / x          IMAGING  < from: CT Head No Cont (07.13.22 @ 11:30) >  IMPRESSION: Minimal decrease in subarachnoid hemorrhage in the left   superior fissure and sulci of the left parietal lobe. No new hemorrhage   is identified.    --- End of Report ---    < end of copied text >  < from: CT Head No Cont (07.12.22 @ 20:27) >    There has been interval appearance of acute subarachnoid hemorrhage   within the posterior lateral sulcus of the sylvian fissure and left   parietal sulci. There is a small amount of edema seen within the left   posterior insula (image #13 through 16 series 2). 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 There is no   evidence of mass-effect or midline shift. There is no evidence of an   intra  -axial fluid collection.    Visualized paranasal sinuses and bilateral mastoid air cells are clear.    Impression: Interval appearance of left sylvian fissure and no lesion   parietal subarachnoid hemorrhage.    --- End of Report ---    < end of copied text >  < from: CT Brain Stroke Protocol (07.12.22 @ 11:28) >  IMPRESSION: Hyperdense left MCA which is suspicious for thrombus and   acute left MCA territory infarct.    --- End of Report ---    < end of copied text >  < from: CT Brain Perfusion Maps Stroke (07.12.22 @ 11:27) >    IMPRESSION: Abnormal perfusion with RAPID calculated mismatch volume of   136 ml and mismatch ratio is 4.4 within the left hemisphere.    --- End of Report ---    < end of copied text >  < from: CT Angio Neck Stroke Protocol w/ IV Cont (07.12.22 @ 11:26) >  IMPRESSION: Occlusion of the midportion of the left M1 segment to the   proximal M2 segment and high-grade narrowing involving the origin of the   left anterior temporal artery.      < end of copied text >  < from: CT Angio Brain Stroke Protocol  w/ IV Cont (07.12.22 @ 11:26) >  IMPRESSION: Occlusion of the midportion of the left M1 segment to the   proximal M2 segment and high-grade narrowing involving the origin of the   left anterior temporal artery.    < end of copied text >

## 2022-07-14 NOTE — CONSULT NOTE ADULT - ASSESSMENT
A/P:  1. 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 Afib on Xarelto  3. severe CAD with multiple PCI (most recent 2019)  4. recent COVID19 infection 7/8/22 tx Paxlovid    - CTH, CTA head reviewed re: thrombus and territorial infarct; most recent CTH 7/13 reviewed w minimal decrease in SAH in L superior fissure and sulci of L parietal lobe; no new hemorrhage.  - CTA neck w narrowing of R vertebral artery  - TTE w buble study limited; no PFO EF 65%  - plan for transition from xarelto to eliquis when stable  - PT/OT c/s   - holding off aspirin in setting of thrombectomy A/P:  1. 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 Afib on Xarelto  3. severe CAD with multiple PCI (most recent 2019)  4. recent COVID19 infection 7/8/22 tx Paxlovid  5. essential HTN    - CTH, CTA head reviewed re: thrombus and territorial infarct; most recent CTH 7/13 reviewed w minimal decrease in SAH in L superior fissure and sulci of L parietal lobe; no new hemorrhage.  - CTA neck w narrowing of R vertebral artery  - labs reviewed, WNL  - TTE w buble study limited; no PFO EF 65%  - plan for MR w GRE to clarify SAH reading vs. contrast  - plan for transition from xarelto to eliquis when stable;  - PT/OT c/s -- acute rehab  - holding off aspirin in setting of thrombectomy; restart per neuro  - restart losartaan 100mg po daily   - restart toprol xl 12.5mg po BID  - continue statin.    Discussed w Dr. Raya and PCP Dr. Raya

## 2022-07-15 LAB
ANION GAP SERPL CALC-SCNC: 10 MMOL/L — SIGNIFICANT CHANGE UP (ref 5–17)
BUN SERPL-MCNC: 17 MG/DL — SIGNIFICANT CHANGE UP (ref 7–23)
CALCIUM SERPL-MCNC: 8.6 MG/DL — SIGNIFICANT CHANGE UP (ref 8.4–10.5)
CHLORIDE SERPL-SCNC: 106 MMOL/L — SIGNIFICANT CHANGE UP (ref 96–108)
CO2 SERPL-SCNC: 25 MMOL/L — SIGNIFICANT CHANGE UP (ref 22–31)
CREAT SERPL-MCNC: 1.05 MG/DL — SIGNIFICANT CHANGE UP (ref 0.5–1.3)
EGFR: 73 ML/MIN/1.73M2 — SIGNIFICANT CHANGE UP
GLUCOSE BLDC GLUCOMTR-MCNC: 112 MG/DL — HIGH (ref 70–99)
GLUCOSE BLDC GLUCOMTR-MCNC: 130 MG/DL — HIGH (ref 70–99)
GLUCOSE SERPL-MCNC: 107 MG/DL — HIGH (ref 70–99)
HCT VFR BLD CALC: 37.1 % — LOW (ref 39–50)
HGB BLD-MCNC: 12.3 G/DL — LOW (ref 13–17)
MAGNESIUM SERPL-MCNC: 2 MG/DL — SIGNIFICANT CHANGE UP (ref 1.6–2.6)
MCHC RBC-ENTMCNC: 30.7 PG — SIGNIFICANT CHANGE UP (ref 27–34)
MCHC RBC-ENTMCNC: 33.2 GM/DL — SIGNIFICANT CHANGE UP (ref 32–36)
MCV RBC AUTO: 92.5 FL — SIGNIFICANT CHANGE UP (ref 80–100)
NRBC # BLD: 0 /100 WBCS — SIGNIFICANT CHANGE UP (ref 0–0)
PHOSPHATE SERPL-MCNC: 2.8 MG/DL — SIGNIFICANT CHANGE UP (ref 2.5–4.5)
PLATELET # BLD AUTO: 262 K/UL — SIGNIFICANT CHANGE UP (ref 150–400)
POTASSIUM SERPL-MCNC: 4.1 MMOL/L — SIGNIFICANT CHANGE UP (ref 3.5–5.3)
POTASSIUM SERPL-SCNC: 4.1 MMOL/L — SIGNIFICANT CHANGE UP (ref 3.5–5.3)
RBC # BLD: 4.01 M/UL — LOW (ref 4.2–5.8)
RBC # FLD: 12.8 % — SIGNIFICANT CHANGE UP (ref 10.3–14.5)
SODIUM SERPL-SCNC: 141 MMOL/L — SIGNIFICANT CHANGE UP (ref 135–145)
WBC # BLD: 9.52 K/UL — SIGNIFICANT CHANGE UP (ref 3.8–10.5)
WBC # FLD AUTO: 9.52 K/UL — SIGNIFICANT CHANGE UP (ref 3.8–10.5)

## 2022-07-15 PROCEDURE — 99232 SBSQ HOSP IP/OBS MODERATE 35: CPT

## 2022-07-15 PROCEDURE — 93010 ELECTROCARDIOGRAM REPORT: CPT

## 2022-07-15 PROCEDURE — 99233 SBSQ HOSP IP/OBS HIGH 50: CPT

## 2022-07-15 PROCEDURE — 70450 CT HEAD/BRAIN W/O DYE: CPT | Mod: 26

## 2022-07-15 RX ORDER — PANTOPRAZOLE SODIUM 20 MG/1
1 TABLET, DELAYED RELEASE ORAL
Qty: 0 | Refills: 0 | DISCHARGE

## 2022-07-15 RX ORDER — HYDRALAZINE HCL 50 MG
5 TABLET ORAL ONCE
Refills: 0 | Status: COMPLETED | OUTPATIENT
Start: 2022-07-15 | End: 2022-07-15

## 2022-07-15 RX ORDER — PANTOPRAZOLE SODIUM 20 MG/1
40 TABLET, DELAYED RELEASE ORAL
Refills: 0 | Status: DISCONTINUED | OUTPATIENT
Start: 2022-07-16 | End: 2022-07-23

## 2022-07-15 RX ORDER — TAMSULOSIN HYDROCHLORIDE 0.4 MG/1
0.4 CAPSULE ORAL AT BEDTIME
Refills: 0 | Status: DISCONTINUED | OUTPATIENT
Start: 2022-07-15 | End: 2022-07-23

## 2022-07-15 RX ORDER — METOPROLOL TARTRATE 50 MG
12.5 TABLET ORAL DAILY
Refills: 0 | Status: DISCONTINUED | OUTPATIENT
Start: 2022-07-16 | End: 2022-07-17

## 2022-07-15 RX ORDER — FAMOTIDINE 10 MG/ML
1 INJECTION INTRAVENOUS
Qty: 0 | Refills: 0 | DISCHARGE

## 2022-07-15 RX ORDER — FAMOTIDINE 10 MG/ML
40 INJECTION INTRAVENOUS DAILY
Refills: 0 | Status: DISCONTINUED | OUTPATIENT
Start: 2022-07-15 | End: 2022-07-23

## 2022-07-15 RX ORDER — LOSARTAN POTASSIUM 100 MG/1
1 TABLET, FILM COATED ORAL
Qty: 0 | Refills: 0 | DISCHARGE

## 2022-07-15 RX ADMIN — CHLORHEXIDINE GLUCONATE 1 APPLICATION(S): 213 SOLUTION TOPICAL at 06:38

## 2022-07-15 RX ADMIN — Medication 5 MILLIGRAM(S): at 22:36

## 2022-07-15 RX ADMIN — TAMSULOSIN HYDROCHLORIDE 0.4 MILLIGRAM(S): 0.4 CAPSULE ORAL at 22:59

## 2022-07-15 RX ADMIN — ATORVASTATIN CALCIUM 80 MILLIGRAM(S): 80 TABLET, FILM COATED ORAL at 22:37

## 2022-07-15 RX ADMIN — FAMOTIDINE 40 MILLIGRAM(S): 10 INJECTION INTRAVENOUS at 12:01

## 2022-07-15 RX ADMIN — Medication 12.5 MILLIGRAM(S): at 04:30

## 2022-07-15 RX ADMIN — ENOXAPARIN SODIUM 40 MILLIGRAM(S): 100 INJECTION SUBCUTANEOUS at 22:36

## 2022-07-15 RX ADMIN — Medication 5 MILLIGRAM(S): at 17:31

## 2022-07-15 NOTE — PROGRESS NOTE ADULT - SUBJECTIVE AND OBJECTIVE BOX
HPI:  77 y/o male non-smoker w/ pmHx of Afib (on Xarelto), CVD (mid LAD, mid RCA, OM1, OM2, OM3 stents 4841-2297), HTN, HLD, GERD, and COVID infection 22 (tx w/ Paxlovid) presented to ED for unwitnessed fall around 10:15am 22. Patient's spouse states she heard a bang in a separate room at the house and found her  on the carpet. Patient was conscious, facedown, non-verbal, and moving only one of his arms (spouse could not recall right or left). No head trauma, LOC, or seizure activity was witnessed. This has never happened before.      Upon arrival to ED, patient had L gaze, aphasic, R sided plegia. CTH with hyperdense left MCA which was suspicious for thrombus and acute left MCA territory infarct. CTA with occlusion of the midportion of the left M1 segment to the proximal M2 segment. CTP with abnormal perfusion left MCA mismatch. BP in ED 170s/100.    Of note, wife reports they just got back from a 3 week trip from [facility] and were on a 12 hour flight on . Patient tested positive for COVID on  and was prescribed Paxlovid. Wife states patient usually takes his xarelto and other medications every morning around 9 or 10am, is unsure if he took them today. Patient is receiving care with cardiologist Dr. Brett Raya of [place] ([phone]). Patient is s/p mid LAD stent (2019), mid RCA stent (), and OM1-OM3 stents (2014).     Decision was made to take patient to thrombectomy with verbal consent from spouse. Patient was urgently taken to cath lab. (2022 13:03)    Vital Signs Last 24 Hrs  T(C): 36.6 (15 Jul 2022 17:45), Max: 37.1 (15 Jul 2022 05:19)  T(F): 97.8 (15 Jul 2022 17:45), Max: 98.8 (15 Jul 2022 05:19)  HR: 94 (15 Jul 2022 18:35) (33 - 94)  BP: 158/88 (15 Jul 2022 18:35) (124/76 - 194/86)  BP(mean): 118 (15 Jul 2022 18:35) (94 - 124)  RR: 16 (15 Jul 2022 18:35) (16 - 18)  SpO2: 97% (15 Jul 2022 18:35) (93% - 98%)    Parameters below as of 15 Jul 2022 18:35  Patient On (Oxygen Delivery Method): room air        I&O's Summary    2022 07:01  -  15 Jul 2022 07:00  --------------------------------------------------------  IN: 0 mL / OUT: 1600 mL / NET: -1600 mL    15 Jul 2022 07:01  -  15 Jul 2022 20:17  --------------------------------------------------------  IN: 0 mL / OUT: 600 mL / NET: -600 mL        PHYSICAL EXAM:  GEN: laying in bed, appears well, NAD  NEURO: AOx3. FC, OE spont, some word finding difficulties, face symmetric. CNII-XII intact. PERRL, EOMI. mild R drift. MAEx4. RUE 4+/5 o/w 5/5 throughout. SILT  CV: RRR +S1/S2  PULM: CTAB  GI: Abd soft, NT/ND  EXT: ext warm, dry, nontender. DP/PT 2+ b/l  WOUND: groin site c/d/i, nontender, no palpable hematoma      LABS:                        12.3   9.52  )-----------( 262      ( 15 Jul 2022 07:08 )             37.1     07-15    141  |  106  |  17  ----------------------------<  107<H>  4.1   |  25  |  1.05    Ca    8.6      15 Jul 2022 07:08  Phos  2.8     07-15  Mg     2.0     07-15        Urinalysis Basic - ( 2022 21:51 )    Color: Yellow / Appearance: Clear / S.015 / pH: x  Gluc: x / Ketone: NEGATIVE  / Bili: Negative / Urobili: 0.2 E.U./dL   Blood: x / Protein: NEGATIVE mg/dL / Nitrite: NEGATIVE   Leuk Esterase: NEGATIVE / RBC: Many /HPF / WBC < 5 /HPF   Sq Epi: x / Non Sq Epi: 0-5 /HPF / Bacteria: Present /HPF          CAPILLARY BLOOD GLUCOSE      POCT Blood Glucose.: 130 mg/dL (15 Jul 2022 11:45)  POCT Blood Glucose.: 112 mg/dL (15 Jul 2022 06:08)  POCT Blood Glucose.: 94 mg/dL (2022 21:23)      Drug Levels: [] N/A    CSF Analysis: [] N/A      Allergies    penicillin (Unknown)    Intolerances      MEDICATIONS:  Antibiotics:    Neuro:  acetaminophen     Tablet .. 650 milliGRAM(s) Oral every 6 hours PRN  melatonin 5 milliGRAM(s) Oral at bedtime  ondansetron Injectable 4 milliGRAM(s) IV Push every 6 hours PRN    Anticoagulation:  enoxaparin Injectable 40 milliGRAM(s) SubCutaneous every 24 hours    OTHER:  atorvastatin 80 milliGRAM(s) Oral at bedtime  chlorhexidine 2% Cloths 1 Application(s) Topical <User Schedule>  famotidine    Tablet 40 milliGRAM(s) Oral daily  senna 2 Tablet(s) Oral at bedtime PRN  tamsulosin 0.4 milliGRAM(s) Oral at bedtime    IVF:    CULTURES:    RADIOLOGY & ADDITIONAL TESTS:      ASSESSMENT:  76y Male with PMHx of afib (on xarelto), HTN, HLD, recent COVID infection (diagnosed 22, on Paxlovid) with L MCA stroke s/p mechanical cerebral thrombectomy of L distal M1 occlusion TICI 0 to 3 ().    PLAN:   - continue neuro/vascular checks  - continue care as per primary team   - no neurosurgical intervention required at this time, please re-consult with any new concern    Discussed with Dr. Lunsford

## 2022-07-15 NOTE — PROGRESS NOTE ADULT - NS ATTEND AMEND GEN_ALL_CORE FT
The patient is a 76-year-old man with atrial fibrillation (previuosly on Xarelto), hypertension, hyperlipidemia, and recent COVID infection for which he was started on Paxlovid last week.  Initial NIHSS 28 for a left MCA syndrome. He was not a tpa candidate given it was unclear if he was taking Xeralto/last dose. He s/p successful thrombectomy with significant improvement in his exam. Repeat HCT showed area of contrast staining +/- SAH. Plan to obtain repeat HCT vs MRI brain (delayed due to HTN last night). When able, will likely transition to Eliquis for better stroke prevention in future compared to Xeralto.  Will need f/u of carotid dissection (?Iatrogenic vs related to stroke). Plan for aggressive PT/OT/SpT The patient is a 76-year-old man with atrial fibrillation (previuosly on Xarelto), hypertension, hyperlipidemia, and recent COVID infection for which he was started on Paxlovid last week.  Initial NIHSS 28 for a left MCA syndrome. He was not a tpa candidate given it was unclear if he was taking Xeralto/last dose. He s/p successful thrombectomy with significant improvement in his exam. Repeat HCT showed area of contrast staining +/- SAH. Plan to obtain repeat HCT vs MRI brain (delayed due to HTN last night). When able, will likely transition to Eliquis for better stroke prevention in future compared to Xeralto.  Will need f/u of carotid dissection (?Iatrogenic vs related to stroke). Plan for aggressive PT/OT/SpT    Will discuss bradycardia and need for beta blocker for A Fib with IM. Monitor urinary retention. The patient is a 76-year-old man with atrial fibrillation (previuosly on Xarelto), hypertension, hyperlipidemia, and recent COVID infection for which he was started on Paxlovid last week.  Initial NIHSS 28 for a left MCA syndrome. He was not a tpa candidate given it was unclear if he was taking Xeralto/last dose. He s/p successful thrombectomy with significant improvement in his exam. Repeat HCT showed area of contrast staining +/- SAH. Plan to obtain repeat HCT vs MRI brain (delayed due to HTN last night). When able, will likely transition to Eliquis for better stroke prevention in future compared to Xeralto.  Will need f/u of carotid dissection (?Iatrogenic vs related to stroke). Plan for aggressive PT/OT/SpT   SBP<160 given potential of SAD  Will discuss bradycardia and need for beta blocker for A Fib with IM. Monitor urinary retention.

## 2022-07-15 NOTE — PROGRESS NOTE ADULT - ASSESSMENT
76y Male with PMHx of Afib (on Xarelto), CVD (mid LAD, mid RCA, OM1, OM2, OM3 stents 2822-3353), HTN, HLD, GERD, and COVID infection 7/8/22 (tx w/ Paxlovid) presents to ED by EMS, found to be a thrombectomy case with L gaze, aphasia, R sided plegia. CTH showed hyperdense L MCA sign, confirmed on CTA. CTP showed significant mismatch. Patient was not a tpa candidate as he had likely taken Xeralto within 3 hours prior to arrival, was on Paxlovid + Xeralto which would elevate bleeding risk regardless. Patient deemed appropriate candidate for thrombectomy and transferred to IR suite for intervention- TICI3 achieved. Of note, L ICA dissection (non-occlusive) was noted - unclear if cause of stroke or iatrogenic- will need f/u. Exam improving. CTH with SAH vs. contrast extravasation on L sylvian fissure. Holding home xarelto for now. Stable for step down to 7 [place]man for continuation of stroke workup.    Neuro  #CVA workup  - holding home xarelto for now  - continue atorvastatin 80mg daily   - q4hr stroke neuro checks and vitals  - Stroke Code HCT Results: Hyperdense left MCA which is suspicious for thrombus and acute left MCA territory infarct.  - Stroke Code CTA Results: Occlusion of the midportion of the left M1 segment to the proximal M2 segment and high-grade narrowing involving the origin of the left anterior temporal artery. Focal area of mild to moderate narrowing involving the origin of the right vertebral artery. No carotid stenosis.  - repeat CTH with SAH vs. contrast extravasation on L sylvian fissure.  - f/u MRI w/o contrast with GRE  - PT/OT/SLP  - Stroke education    Cards  #HTN  - -160  - restarted half of home metop dose - 12.5mg metoprolol succinate QD (takes it BID at home).   - echo:  Limited study obtained for evaluation of left ventricular function. Normal left and right ventricular size and systolic function. Mildly dilated left atrium. No pericardial effusion. The left ventricle is normal in size and systolic function with a calculated ejection fraction of 65%.      #HLD  - high dose statin as above in CVA  - LDL results: 54    Pulm  - call provider if SPO2 < 94%    GI  #Nutrition/Fluids/Electrolytes   - replete K<4 and Mg <2  - Diet: regular  - cont home PPI    Renal  - daily BMP    Infectious Disease  COVID + 7/8, started on paxlovid OSH  - afebrile  - COVID+, isolation precautions  - CXR: Heart size within normal limits, thoracic aortic calcification. Lungs and mediastinum are unremarkable. Thoracic spine degenerative changes, dextroscoliosis. Elevation of the right hemidiaphragm.    Endocrine  - A1C results: 5.6    - TSH results: 0.975    DVT Prophylaxis  - SCDs and lovenox (started 7/13) for DVT prophylaxis   - LE dopplers negative    Dispo: recommended for acute rehab. Since his initial COVID test was an at home rapid, 10 day isolation period required for transfer to acute rehab began on 7/12 from positive in-house PCR test. Will need to remain in isolation until at least 7/22.     PT/OT: AR     Discussed daily hospital plans and goals with patient and family at bedside.    Discussed with Neurology Attending Dr. Raya     76y Male with PMHx of Afib (on Xarelto), CVD (mid LAD, mid RCA, OM1, OM2, OM3 stents 2664-1689), HTN, HLD, GERD, and COVID infection 7/8/22 (tx w/ Paxlovid) presents to ED by EMS, found to be a thrombectomy case with L gaze, aphasia, R sided plegia. CTH showed hyperdense L MCA sign, confirmed on CTA. CTP showed significant mismatch. Patient was not a tpa candidate as he had likely taken Xeralto within 3 hours prior to arrival, was on Paxlovid + Xeralto which would elevate bleeding risk regardless. Patient deemed appropriate candidate for thrombectomy and transferred to IR suite for intervention- TICI3 achieved. Of note, L ICA dissection (non-occlusive) was noted - unclear if cause of stroke or iatrogenic- will need f/u. CTH with SAH vs. contrast extravasation on L sylvian fissure. Holding home xarelto for now.     Neuro  #CVA workup  - holding home xarelto for now  - continue atorvastatin 80mg daily   - q4hr stroke neuro checks and vitals  - Stroke Code HCT Results: Hyperdense left MCA which is suspicious for thrombus and acute left MCA territory infarct.  - Stroke Code CTA Results: Occlusion of the midportion of the left M1 segment to the proximal M2 segment and high-grade narrowing involving the origin of the left anterior temporal artery. Focal area of mild to moderate narrowing involving the origin of the right vertebral artery. No carotid stenosis.  - repeat CTH with SAH vs. contrast extravasation on L sylvian fissure.  - f/u MRI w/o contrast with GRE  - PT/OT/SLP  - Stroke education    Cards  #HTN  - -160  - c/w metoprolol succinate 12.5mg daily (takes BID at home), continuing to hold home losartan 100mg daily   - echo:  Limited study obtained for evaluation of left ventricular function. Normal left and right ventricular size and systolic function. Mildly dilated left atrium. No pericardial effusion. The left ventricle is normal in size and systolic function with a calculated ejection fraction of 65%.    #bradycardia  - obtain ekg       #HLD  - high dose statin as above in CVA  - LDL results: 54    Pulm  - call provider if SPO2 < 94%    GI  #Nutrition/Fluids/Electrolytes   - replete K<4 and Mg <2  - Diet: regular  - cont home PPI    Renal  - daily BMP    Infectious Disease  COVID + 7/8, started on paxlovid OSH  - afebrile  - COVID+, isolation precautions  - CXR: Heart size within normal limits, thoracic aortic calcification. Lungs and mediastinum are unremarkable. Thoracic spine degenerative changes, dextroscoliosis. Elevation of the right hemidiaphragm.    Endocrine  - A1C results: 5.6    - TSH results: 0.975    DVT Prophylaxis  - SCDs and lovenox (started 7/13) for DVT prophylaxis   - LE dopplers negative    Dispo: recommended for acute rehab. Since his initial COVID test was an at home rapid, 10 day isolation period required for transfer to acute rehab began on 7/12 from positive in-house PCR test. Will need to remain in isolation until at least 7/22.     PT/OT: AR     Discussed daily hospital plans and goals with patient and family at bedside.    Discussed with Neurology Attending Dr. Raya     76y Male with PMHx of Afib (on Xarelto), CVD (mid LAD, mid RCA, OM1, OM2, OM3 stents 7267-7363), HTN, HLD, GERD, and COVID infection 7/8/22 (tx w/ Paxlovid) presents to ED by EMS, found to be a thrombectomy case with L gaze, aphasia, R sided plegia. CTH showed hyperdense L MCA sign, confirmed on CTA. CTP showed significant mismatch. Patient was not a tpa candidate as he had likely taken Xeralto within 3 hours prior to arrival, was on Paxlovid + Xeralto which would elevate bleeding risk regardless. Patient deemed appropriate candidate for thrombectomy and transferred to IR suite for intervention- TICI3 achieved. Of note, L ICA dissection (non-occlusive) was noted, likely iatrogenic. Stroke etiology more likely due to being off xarelto when patient was on paxlovid. CTH with SAH vs. contrast extravasation on L sylvian fissure.     Neuro  #CVA workup  - holding home xarelto for now, likely to switch to eliquis when cleared to start   - continue atorvastatin 80mg daily   - q4hr stroke neuro checks and vitals  - Stroke Code HCT Results: Hyperdense left MCA which is suspicious for thrombus and acute left MCA territory infarct.  - Stroke Code CTA Results: Occlusion of the midportion of the left M1 segment to the proximal M2 segment and high-grade narrowing involving the origin of the left anterior temporal artery. Focal area of mild to moderate narrowing involving the origin of the right vertebral artery. No carotid stenosis.  - repeat CTH with SAH vs. contrast extravasation on L sylvian fissure.  - f/u MRI w/o contrast with GRE. If unable, will obtain repeat CTH non con.   - PT/OT/SLP  - Stroke education    Cards  #HTN  - -160  - c/w metoprolol succinate 12.5mg daily (takes BID at home), continuing to hold home losartan 100mg daily   - echo:  Limited study obtained for evaluation of left ventricular function. Normal left and right ventricular size and systolic function. Mildly dilated left atrium. No pericardial effusion. The left ventricle is normal in size and systolic function with a calculated ejection fraction of 65%.    #bradycardia  Bradycardic down to the 30s 7/15. Asymptomatic  - repeat ekg 7/15 with NSR with APC    #afib  - holding AC  - may hold metoprolol in setting of bradycardia and Type II heart block      #HLD  - high dose statin as above in CVA  - LDL results: 54    Pulm  - call provider if SPO2 < 94%    GI  #Nutrition/Fluids/Electrolytes   - replete K<4 and Mg <2  - Diet: regular  - cont home PPI    Renal  - daily BMP    Infectious Disease  COVID + 7/8, started on paxlovid OSH  - afebrile  - COVID+, isolation precautions  - CXR: Heart size within normal limits, thoracic aortic calcification. Lungs and mediastinum are unremarkable. Thoracic spine degenerative changes, dextroscoliosis. Elevation of the right hemidiaphragm.    Endocrine  - A1C results: 5.6    - TSH results: 0.975    DVT Prophylaxis  - SCDs and lovenox (started 7/13) for DVT prophylaxis   - LE dopplers negative    Dispo: recommended for acute rehab. Since his initial COVID test was an at home rapid, 10 day isolation period required for transfer to acute rehab began on 7/12 from positive in-house PCR test. Will need to remain in isolation until at least 7/22.     PT/OT: AR     Discussed daily hospital plans and goals with patient and family at bedside.    Discussed with Neurology Attending Dr. Raya     76y Male with PMHx of Afib (on Xarelto), CVD (mid LAD, mid RCA, OM1, OM2, OM3 stents 7259-7178), HTN, HLD, GERD, and COVID infection 7/8/22 (tx w/ Paxlovid) presents to ED by EMS, found to be a thrombectomy case with L gaze, aphasia, R sided plegia. CTH showed hyperdense L MCA sign, confirmed on CTA. CTP showed significant mismatch. Patient was not a tpa candidate as he had likely taken Xeralto within 3 hours prior to arrival, was on Paxlovid + Xeralto which would elevate bleeding risk regardless. Patient deemed appropriate candidate for thrombectomy and transferred to IR suite for intervention- TICI3 achieved. Of note, L ICA dissection (non-occlusive) was noted, likely iatrogenic. Stroke etiology more likely due to being off xarelto when patient was on paxlovid. CTH with SAH vs. contrast extravasation on L sylvian fissure.     Neuro  #CVA workup  - holding home xarelto for now, likely to switch to eliquis when cleared to start   - continue atorvastatin 80mg daily   - q4hr stroke neuro checks and vitals  - Stroke Code HCT Results: Hyperdense left MCA which is suspicious for thrombus and acute left MCA territory infarct.  - Stroke Code CTA Results: Occlusion of the midportion of the left M1 segment to the proximal M2 segment and high-grade narrowing involving the origin of the left anterior temporal artery. Focal area of mild to moderate narrowing involving the origin of the right vertebral artery. No carotid stenosis.  - repeat CTH with SAH vs. contrast extravasation on L sylvian fissure.  - f/u MRI w/o contrast with GRE. If unable, will obtain repeat CTH non con.   - PT/OT/SLP  - Stroke education    Cards  #HTN  - -160  - c/w metoprolol succinate 12.5mg daily (takes BID at home), continuing to hold home losartan 100mg daily   - echo:  Limited study obtained for evaluation of left ventricular function. Normal left and right ventricular size and systolic function. Mildly dilated left atrium. No pericardial effusion. The left ventricle is normal in size and systolic function with a calculated ejection fraction of 65%.    #bradycardia  Bradycardic down to the 30s 7/15. Asymptomatic  - repeat ekg 7/15 with NSR with APC    #afib  - holding AC  - may hold metoprolol in setting of bradycardia and Type II heart block      #HLD  - high dose statin as above in CVA  - LDL results: 54    Pulm  - call provider if SPO2 < 94%    GI  #Nutrition/Fluids/Electrolytes   - replete K<4 and Mg <2  - Diet: regular  - cont home PPI    Renal  - daily BMP    Infectious Disease  COVID + 7/8, started on paxlovid OSH  - afebrile  - COVID+, isolation precautions  - CXR: Heart size within normal limits, thoracic aortic calcification. Lungs and mediastinum are unremarkable. Thoracic spine degenerative changes, dextroscoliosis. Elevation of the right hemidiaphragm.    Endocrine  - A1C results: 5.6  - TSH results: 0.975    Urology  #urinary retention  Voiding, but continues to retain requiring straight cath x2  - will need saleh 7/15 if continues to retain on bladder scan. Will attempt commode use first to assist with complete void.     DVT Prophylaxis  - SCDs and lovenox (started 7/13) for DVT prophylaxis   - LE dopplers negative    Dispo: recommended for acute rehab. Since his initial COVID test was an at home rapid, 10 day isolation period required for transfer to acute rehab began on 7/12 from positive in-house PCR test. Will need to remain in isolation until at least 7/22.     PT/OT: AR     Discussed daily hospital plans and goals with patient and family at bedside.    Discussed with Neurology Attending Dr. Raya     76y Male with PMHx of Afib (on Xarelto), CVD (mid LAD, mid RCA, OM1, OM2, OM3 stents 2688-0166), HTN, HLD, GERD, and COVID infection 7/8/22 (tx w/ Paxlovid) presents to ED by EMS, found to be a thrombectomy case with L gaze, aphasia, R sided plegia. CTH showed hyperdense L MCA sign, confirmed on CTA. CTP showed significant mismatch. Patient was not a tpa candidate as he had likely taken Xeralto within 3 hours prior to arrival. S/p thrombectomy TICI 0 to 3. Of note, L ICA dissection (non-occlusive) was noted, likely iatrogenic. Stroke etiology more likely due to being off xarelto when patient was on paxlovid. CTH with SAH vs. contrast extravasation on L sylvian fissure.     Neuro  #CVA workup  - holding home xarelto for now, likely to switch to eliquis when cleared to start   - continue atorvastatin 80mg daily   - q4hr stroke neuro checks and vitals  - Stroke Code HCT Results: Hyperdense left MCA which is suspicious for thrombus and acute left MCA territory infarct.  - Stroke Code CTA Results: Occlusion of the midportion of the left M1 segment to the proximal M2 segment and high-grade narrowing involving the origin of the left anterior temporal artery. Focal area of mild to moderate narrowing involving the origin of the right vertebral artery. No carotid stenosis.  - repeat CTH with SAH vs. contrast extravasation on L sylvian fissure.  - f/u MRI w/o contrast with GRE. If unable, will obtain repeat CTH non con.   - PT/OT/SLP  - Stroke education    Cards  #HTN  - -160  - c/w metoprolol succinate 12.5mg daily (takes BID at home), continuing to hold home losartan 100mg daily   - echo:  Limited study obtained for evaluation of left ventricular function. Normal left and right ventricular size and systolic function. Mildly dilated left atrium. No pericardial effusion. The left ventricle is normal in size and systolic function with a calculated ejection fraction of 65%.    #bradycardia  Bradycardic down to the 30s 7/15. Asymptomatic  - ekg 7/15 with NSR with APC  - obtain rpt ekg am 7/16    #afib  - holding AC  - halved home dose of metoprolol due to bradycardia      #HLD  - high dose statin as above in CVA  - LDL results: 54    Pulm  - call provider if SPO2 < 94%    GI  #Nutrition/Fluids/Electrolytes   - replete K<4 and Mg <2  - Diet: regular  - cont home PPI    Renal  - daily BMP    Infectious Disease  COVID + 7/8, started on paxlovid OSH  - afebrile  - COVID+, isolation precautions  - CXR: Heart size within normal limits, thoracic aortic calcification. Lungs and mediastinum are unremarkable. Thoracic spine degenerative changes, dextroscoliosis. Elevation of the right hemidiaphragm.    Endocrine  - A1C results: 5.6  - TSH results: 0.975    Urology  #urinary retention  Voiding, but continues to retain requiring straight cath x2  - patient and family declines saleh. Will continue to bladder scan protocol    DVT Prophylaxis  - SCDs and lovenox (started 7/13) for DVT prophylaxis   - LE dopplers negative    Dispo: acute rehab. Since his initial COVID test was an at home rapid, 10 day isolation period required for transfer to acute rehab began on 7/12 from positive in-house PCR test. Will need to remain in isolation until at least 7/22.     PT/OT: AR     Discussed daily hospital plans and goals with patient and family at bedside.    Discussed with Neurology Attending Dr. Raya     76y Male with PMHx of Afib (on Xarelto), CVD (mid LAD, mid RCA, OM1, OM2, OM3 stents [id number]), HTN, HLD, GERD, and COVID infection 7/8/22 (tx w/ Paxlovid) presents to ED by EMS, found to be a thrombectomy case with L gaze, aphasia, R sided plegia. CTH showed hyperdense L MCA sign, confirmed on CTA. CTP showed significant mismatch. Patient was not a tpa candidate as he had likely taken Xeralto within 3 hours prior to arrival. S/p thrombectomy TICI 0 to 3. Of note, L ICA dissection (non-occlusive) was noted, likely iatrogenic. Stroke etiology more likely due to being off xarelto when patient was on paxlovid. CTH with SAH vs. contrast extravasation on L sylvian fissure.     Neuro  #CVA workup  - holding home xarelto for now, likely to switch to eliquis when cleared to start   - continue atorvastatin 80mg daily   - q4hr stroke neuro checks and vitals  - Stroke Code HCT Results: Hyperdense left MCA which is suspicious for thrombus and acute left MCA territory infarct.  - Stroke Code CTA Results: Occlusion of the midportion of the left M1 segment to the proximal M2 segment and high-grade narrowing involving the origin of the left anterior temporal artery. Focal area of mild to moderate narrowing involving the origin of the right vertebral artery. No carotid stenosis.  - repeat CTH with SAH vs. contrast extravasation on L sylvian fissure.  - f/u MRI w/o contrast with GRE. If unable, will obtain repeat CTH non con.   - PT/OT/SLP  - Stroke education    Cards  #HTN  - -160  - c/w metoprolol succinate 12.5mg daily (takes BID at home), continuing to hold home losartan 100mg daily (most likely will restart tomorrow)  - echo:  Limited study obtained for evaluation of left ventricular function. Normal left and right ventricular size and systolic function. Mildly dilated left atrium. No pericardial effusion. The left ventricle is normal in size and systolic function with a calculated ejection fraction of 65%.    #bradycardia  Bradycardic down to the 30s 7/15. Asymptomatic  - ekg 7/15 with NSR with APC  - obtain rpt ekg am 7/16    #afib  - holding AC  - halved home dose of metoprolol due to bradycardia      #HLD  - high dose statin as above in CVA  - LDL results: 54    Pulm  - call provider if SPO2 < 94%    GI  #Nutrition/Fluids/Electrolytes   - replete K<4 and Mg <2  - Diet: regular  - cont home PPI    Renal  - daily BMP    Infectious Disease  COVID + 7/8, started on paxlovid OSH  - afebrile  - COVID+, isolation precautions  - CXR: Heart size within normal limits, thoracic aortic calcification. Lungs and mediastinum are unremarkable. Thoracic spine degenerative changes, dextroscoliosis. Elevation of the right hemidiaphragm.    Endocrine  - A1C results: 5.6  - TSH results: 0.975    Urology  #urinary retention  Voiding, but continues to retain requiring straight cath x3  - initially declines saleh, but eventually agreed if another straight cath needed    DVT Prophylaxis  - SCDs and lovenox (started 7/13) for DVT prophylaxis   - LE dopplers negative    Dispo: acute rehab. Since his initial COVID test was an at home rapid, 10 day isolation period required for transfer to acute rehab began on 7/12 from positive in-house PCR test. Will need to remain in isolation until at least 7/22.     PT/OT: AR     Discussed daily hospital plans and goals with patient and family at bedside.    Discussed with Neurology Attending Dr. Raya     76y Male with PMHx of Afib (on Xarelto), CVD (mid LAD, mid RCA, OM1, OM2, OM3 stents 2889-8097), HTN, HLD, GERD, and COVID infection 7/8/22 (tx w/ Paxlovid) presents to ED by EMS, found to be a thrombectomy case with L gaze, aphasia, R sided plegia. CTH showed hyperdense L MCA sign, confirmed on CTA. CTP showed significant mismatch. Patient was not a tpa candidate as he had likely taken Xeralto within 3 hours prior to arrival. S/p thrombectomy TICI 0 to 3. Of note, L ICA dissection (non-occlusive) was noted, likely iatrogenic. Stroke etiology more likely due to being off xarelto when patient was on paxlovid. CTH with SAH vs. contrast extravasation on L sylvian fissure.     Neuro  #CVA workup  - holding home xarelto for now, likely to switch to eliquis when cleared to start   - continue atorvastatin 80mg daily   - q4hr stroke neuro checks and vitals  - Stroke Code HCT Results: Hyperdense left MCA which is suspicious for thrombus and acute left MCA territory infarct.  - Stroke Code CTA Results: Occlusion of the midportion of the left M1 segment to the proximal M2 segment and high-grade narrowing involving the origin of the left anterior temporal artery. Focal area of mild to moderate narrowing involving the origin of the right vertebral artery. No carotid stenosis.  - repeat CTH with SAH vs. contrast extravasation on L sylvian fissure.  - f/u MRI w/o contrast with GRE. If unable, will obtain repeat CTH non con.   - PT/OT/SLP  - Stroke education    Cards  #HTN  - -160  - c/w metoprolol succinate 12.5mg daily (takes BID at home), continuing to hold home losartan 100mg daily (most likely will restart tomorrow)  - echo:  Limited study obtained for evaluation of left ventricular function. Normal left and right ventricular size and systolic function. Mildly dilated left atrium. No pericardial effusion. The left ventricle is normal in size and systolic function with a calculated ejection fraction of 65%.    #bradycardia  Bradycardic down to the 30s 7/15. Asymptomatic  - ekg 7/15 with NSR with APC  - obtain rpt ekg am 7/16    #afib  - holding AC  - halved home dose of metoprolol due to bradycardia      #HLD  - high dose statin as above in CVA  - LDL results: 54    Pulm  - call provider if SPO2 < 94%    GI  #Nutrition/Fluids/Electrolytes   - replete K<4 and Mg <2  - Diet: regular  - cont home PPI    Renal  - daily BMP    Infectious Disease  COVID + 7/8, started on paxlovid OSH  - afebrile  - COVID+, isolation precautions  - CXR: Heart size within normal limits, thoracic aortic calcification. Lungs and mediastinum are unremarkable. Thoracic spine degenerative changes, dextroscoliosis. Elevation of the right hemidiaphragm.    Endocrine  - A1C results: 5.6  - TSH results: 0.975    Urology  #urinary retention  Voiding, but continues to retain requiring straight cath x3  - initially declines saleh, but eventually agreed if another straight cath needed  - start tamsulosin 0.4mg tonight    DVT Prophylaxis  - SCDs and lovenox (started 7/13) for DVT prophylaxis   - LE dopplers negative    Dispo: acute rehab. Since his initial COVID test was an at home rapid, 10 day isolation period required for transfer to acute rehab began on 7/12 from positive in-house PCR test. Will need to remain in isolation until at least 7/22.     PT/OT: AR     Discussed daily hospital plans and goals with patient and family at bedside.    Discussed with Neurology Attending Dr. Raya     76y Male with PMHx of Afib (on Xarelto), CVD (mid LAD, mid RCA, OM1, OM2, OM3 stents 7870-5942), HTN, HLD, GERD, and COVID infection 7/8/22 (tx w/ Paxlovid) presents to ED by EMS, found to be a thrombectomy case with L gaze, aphasia, R sided plegia. CTH showed hyperdense L MCA sign, confirmed on CTA. CTP showed significant mismatch. Patient was not a tpa candidate as he had likely taken Xeralto within 3 hours prior to arrival. S/p thrombectomy TICI 0 to 3. Of note, L ICA dissection (non-occlusive) was noted, likely iatrogenic. Stroke etiology more likely due to being off xarelto when patient was on paxlovid. CTH with SAH vs. contrast extravasation on L sylvian fissure.     Neuro  #CVA workup  - holding home xarelto for now, likely to switch to eliquis when cleared to start   - continue atorvastatin 80mg daily   - q4hr stroke neuro checks and vitals  - Stroke Code HCT Results: Hyperdense left MCA which is suspicious for thrombus and acute left MCA territory infarct.  - Stroke Code CTA Results: Occlusion of the midportion of the left M1 segment to the proximal M2 segment and high-grade narrowing involving the origin of the left anterior temporal artery. Focal area of mild to moderate narrowing involving the origin of the right vertebral artery. No carotid stenosis.  - repeat CTH with SAH vs. contrast extravasation on L sylvian fissure.  - f/u MRI w/o contrast with GRE. If unable, will obtain repeat CTH non con. - CTH 7/15 with SAH present, continue to hold AC/antithrombotics.   - PT/OT/SLP  - Stroke education    Cards  #HTN  - -160  - c/w metoprolol succinate 12.5mg daily (takes BID at home), continuing to hold home losartan 100mg daily (most likely will restart tomorrow)  - echo:  Limited study obtained for evaluation of left ventricular function. Normal left and right ventricular size and systolic function. Mildly dilated left atrium. No pericardial effusion. The left ventricle is normal in size and systolic function with a calculated ejection fraction of 65%.    #bradycardia  Bradycardic down to the 30s 7/15. Asymptomatic  - ekg 7/15 with NSR with APC  - obtain rpt ekg am 7/16    #afib  - holding AC  - halved home dose of metoprolol due to bradycardia      #HLD  - high dose statin as above in CVA  - LDL results: 54    Pulm  - call provider if SPO2 < 94%    GI  #Nutrition/Fluids/Electrolytes   - replete K<4 and Mg <2  - Diet: regular  - cont home PPI    Renal  - daily BMP    Infectious Disease  COVID + 7/8, started on paxlovid OSH  - afebrile  - COVID+, isolation precautions  - CXR: Heart size within normal limits, thoracic aortic calcification. Lungs and mediastinum are unremarkable. Thoracic spine degenerative changes, dextroscoliosis. Elevation of the right hemidiaphragm.    Endocrine  - A1C results: 5.6  - TSH results: 0.975    Urology  #urinary retention  Voiding, but continues to retain requiring straight cath x3  - initially declines saleh, but eventually agreed if another straight cath needed  - start tamsulosin 0.4mg tonight    DVT Prophylaxis  - SCDs and lovenox (started 7/13) for DVT prophylaxis   - LE dopplers negative    Dispo: acute rehab. Since his initial COVID test was an at home rapid, 10 day isolation period required for transfer to acute rehab began on 7/12 from positive in-house PCR test. Will need to remain in isolation until at least 7/22.     PT/OT: AR     Discussed daily hospital plans and goals with patient and family at bedside.    Discussed with Neurology Attending Dr. Raya

## 2022-07-15 NOTE — PROGRESS NOTE ADULT - ASSESSMENT
A/P:  1. 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 Afib on Xarelto  3. severe CAD with multiple PCI (most recent 2019)  4. recent COVID19 infection 7/8/22 tx Paxlovid  5. essential HTN    - tele strip with sinus jayjay HR 36; no EKG in chart from overnight. EKG performed this morning with NSR HR 70 and APCs  - reduce toprol XL to 12.5mg daily. continue monitoring on tele  - CTH, CTA head reviewed re: thrombus and territorial infarct; most recent CTH 7/13 reviewed w minimal decrease in SAH in L superior fissure and sulci of L parietal lobe; no new hemorrhage.  - CTA neck w narrowing of R vertebral artery  - labs reviewed, WNL  - TTE w buble study limited; no PFO EF 65%  - plan for MR w GRE to clarify SAH reading vs. contrast  - plan for transition from xarelto to eliquis when stable;  - PT/OT c/s -- acute rehab  - holding off aspirin in setting of thrombectomy; restart per neuro  - restart losartaan 100mg po daily   - restart toprol xl 12.5mg po BID  - continue statin.    discussed with neuro stroke PA

## 2022-07-15 NOTE — PROGRESS NOTE ADULT - SUBJECTIVE AND OBJECTIVE BOX
remains w word finding difficulty  urinary retention and straight cath x 2; does not want saleh catheter  denies new onset weakness    PHYSICAL EXAM:  General: NAD  HENMT: +MMM, no LAD  RESPIRATORY: no increased WOB, CTAB  CVS: RRR, no m/r/g, extremities warm and well perfused, DP 2+ bilaterally  ABD: +BS, NT/ND  EXT: no pitting edema  Neuro: alert and oriented to person, place, time and situation. CN II-XII intact however some dysarthria; bilateral upper and lower extremity strength bilaterally. Sensation grossly equal and intact in upper and lower [id number]

## 2022-07-15 NOTE — PROGRESS NOTE ADULT - SUBJECTIVE AND OBJECTIVE BOX
Neurology Stroke Progress Note    INTERVAL HPI/OVERNIGHT EVENTS:  Attempted to take patient to MRI overnight. Unfortunately, SBP 190s x multiple tries. Transport cancelled. SBP down to 140s with without intervention. Also noted to be bradycardic to the 40s intermittently, asymptomatic. Voiding on his own, but continues to retain with bladder scan 800cc, 600cc, straight cath x2. Patient seen and examined, wife at bedside. No complaints. Wife thinks with some language improvement, but pt does continue to make paraphrasic errors.     MEDICATIONS  (STANDING):  atorvastatin 80 milliGRAM(s) Oral at bedtime  chlorhexidine 2% Cloths 1 Application(s) Topical <User Schedule>  enoxaparin Injectable 40 milliGRAM(s) SubCutaneous every 24 hours  insulin lispro (ADMELOG) corrective regimen sliding scale   SubCutaneous Before meals and at bedtime  melatonin 5 milliGRAM(s) Oral at bedtime  metoprolol succinate ER 12.5 milliGRAM(s) Oral two times a day  pantoprazole  Injectable 40 milliGRAM(s) IV Push daily    MEDICATIONS  (PRN):  acetaminophen     Tablet .. 650 milliGRAM(s) Oral every 6 hours PRN Temp greater or equal to 38C (100.4F), Mild Pain (1 - 3)  ondansetron Injectable 4 milliGRAM(s) IV Push every 6 hours PRN Nausea and/or Vomiting  senna 2 Tablet(s) Oral at bedtime PRN Constipation      Allergies    penicillin (Unknown)    Intolerances        Vital Signs Last 24 Hrs  T(C): 36.6 (15 Jul 2022 09:07), Max: 37.1 (15 Jul 2022 05:19)  T(F): 97.8 (15 Jul 2022 09:07), Max: 98.8 (15 Jul 2022 05:19)  HR: 56 (15 Jul 2022 08:45) (33 - 84)  BP: 124/76 (15 Jul 2022 08:45) (124/76 - 194/86)  BP(mean): 94 (15 Jul 2022 08:45) (94 - 124)  RR: 17 (15 Jul 2022 08:45) (17 - 18)  SpO2: 95% (15 Jul 2022 08:45) (93% - 97%)    Parameters below as of 15 Jul 2022 08:45  Patient On (Oxygen Delivery Method): room air        Physical exam:  General: No acute distress, awake and alert  Eyes: Anicteric sclerae, moist conjunctivae, see below for CNs  Neck: trachea midline, FROM, supple, no thyromegaly or lymphadenopathy  Cardiovascular: Irregular rate and rhythm   Pulmonary: No increased work of breathing. No use of accessory muscles  GI: Abdomen soft, non-distended, non-tender  Extremities: no edema    Neurologic:  -Mental status: Awake, alert, oriented to person, place, and time. Speech with paraphrasic errors, slow and some word finding. Able to complete cross commands. No dysarthria.   -Cranial nerves:   II: Visual fields are full to confrontation.  III, IV, VI: Extraocular movements are intact without nystagmus. Pupils equally round and reactive to light  V:  Facial sensation V1-V3 equal and intact   VII: Face is symmetric with normal eye closure and smile  VIII: Hearing is grossly intact bilaterally.   Motor: Normal bulk and tone. No pronator drift. Strength bilateral upper extremity 5/5, bilateral lower extremities 5/5.  Rapid alternating movements intact and symmetric  Sensation: Intact to light touch bilaterally. No neglect or extinction on double simultaneous testing.  Coordination: Mild dysmetria right arm on FTN.   Gait: Narrow gait and steady    LABS:                        12.3   9.52  )-----------( 262      ( 15 Jul 2022 07:08 )             37.1     07-15    141  |  106  |  17  ----------------------------<  107<H>  4.1   |  25  |  1.05    Ca    8.6      15 Jul 2022 07:08  Phos  2.8     07-15  Mg     2.0     07-15        Urinalysis Basic - ( 2022 21:51 )    Color: Yellow / Appearance: Clear / S.015 / pH: x  Gluc: x / Ketone: NEGATIVE  / Bili: Negative / Urobili: 0.2 E.U./dL   Blood: x / Protein: NEGATIVE mg/dL / Nitrite: NEGATIVE   Leuk Esterase: NEGATIVE / RBC: Many /HPF / WBC < 5 /HPF   Sq Epi: x / Non Sq Epi: 0-5 /HPF / Bacteria: Present /HPF        RADIOLOGY & ADDITIONAL TESTS:     Neurology Stroke Progress Note    INTERVAL HPI/OVERNIGHT EVENTS:  Attempted to take patient to MRI overnight. Unfortunately, SBP 190s x multiple tries. Transport cancelled. SBP down to 140s with without intervention. Also noted to be bradycardic to the 40s intermittently, asymptomatic. Voiding on his own, but continues to retain with bladder scan 800cc, 600cc, straight cath x2. Patient seen and examined, wife at bedside. No complaints. Wife thinks with some language improvement, but pt does continue to make paraphrasic errors.     MEDICATIONS  (STANDING):  atorvastatin 80 milliGRAM(s) Oral at bedtime  chlorhexidine 2% Cloths 1 Application(s) Topical <User Schedule>  enoxaparin Injectable 40 milliGRAM(s) SubCutaneous every 24 hours  insulin lispro (ADMELOG) corrective regimen sliding scale   SubCutaneous Before meals and at bedtime  melatonin 5 milliGRAM(s) Oral at bedtime  metoprolol succinate ER 12.5 milliGRAM(s) Oral two times a day  pantoprazole  Injectable 40 milliGRAM(s) IV Push daily    MEDICATIONS  (PRN):  acetaminophen     Tablet .. 650 milliGRAM(s) Oral every 6 hours PRN Temp greater or equal to 38C (100.4F), Mild Pain (1 - 3)  ondansetron Injectable 4 milliGRAM(s) IV Push every 6 hours PRN Nausea and/or Vomiting  senna 2 Tablet(s) Oral at bedtime PRN Constipation      Allergies    penicillin (Unknown)    Intolerances        Vital Signs Last 24 Hrs  T(C): 36.6 (15 Jul 2022 09:07), Max: 37.1 (15 Jul 2022 05:19)  T(F): 97.8 (15 Jul 2022 09:07), Max: 98.8 (15 Jul 2022 05:19)  HR: 56 (15 Jul 2022 08:45) (33 - 84)  BP: 124/76 (15 Jul 2022 08:45) (124/76 - 194/86)  BP(mean): 94 (15 Jul 2022 08:45) (94 - 124)  RR: 17 (15 Jul 2022 08:45) (17 - 18)  SpO2: 95% (15 Jul 2022 08:45) (93% - 97%)    Parameters below as of 15 Jul 2022 08:45  Patient On (Oxygen Delivery Method): room air        Physical exam:  General: No acute distress, awake and alert  Eyes: Anicteric sclerae, moist conjunctivae, see below for CNs  Neck: trachea midline, FROM, supple, no thyromegaly or lymphadenopathy  Cardiovascular: Irregular rate and rhythm   Pulmonary: No increased work of breathing. No use of accessory muscles  GI: Abdomen soft, non-distended, non-tender  Extremities: no edema    Neurologic:  -Mental status: Awake, alert, oriented to person, place, and time. Speech with paraphrasic errors, slow and some word finding. Able to complete cross commands. No dysarthria.   -Cranial nerves:   II: Visual fields are full to confrontation.  III, IV, VI: Extraocular movements are intact without nystagmus. Pupils equally round and reactive to light  V:  Facial sensation V1-V3 equal and intact   VII: Face is symmetric with normal eye closure and smile  VIII: Hearing is grossly intact bilaterally.   Motor: Normal bulk and tone. No pronator drift. Strength bilateral upper extremity 5/5, bilateral lower extremities 5/5.  Sensation: Intact to light touch bilaterally. No neglect or extinction on double simultaneous testing.  Coordination: Mild dysmetria right arm on FTN.       LABS:                        12.3   9.52  )-----------( 262      ( 15 Jul 2022 07:08 )             37.1     07-15    141  |  106  |  17  ----------------------------<  107<H>  4.1   |  25  |  1.05    Ca    8.6      15 Jul 2022 07:08  Phos  2.8     07-15  Mg     2.0     07-15        Urinalysis Basic - ( 2022 21:51 )    Color: Yellow / Appearance: Clear / S.015 / pH: x  Gluc: x / Ketone: NEGATIVE  / Bili: Negative / Urobili: 0.2 E.U./dL   Blood: x / Protein: NEGATIVE mg/dL / Nitrite: NEGATIVE   Leuk Esterase: NEGATIVE / RBC: Many /HPF / WBC < 5 /HPF   Sq Epi: x / Non Sq Epi: 0-5 /HPF / Bacteria: Present /HPF        RADIOLOGY & ADDITIONAL TESTS:     Neurology Stroke Progress Note    INTERVAL HPI/OVERNIGHT EVENTS:  Attempted to take patient to MRI overnight. Unfortunately, SBP 190s x multiple tries. Transport cancelled. SBP down to 140s with without intervention. Also noted to be bradycardic to the 40s intermittently, asymptomatic. Voiding on his own, but continues to retain with bladder scan 800cc, 600cc, straight cath x2. Patient seen and examined, wife at bedside. No complaints. Wife thinks with some language improvement, but pt does continue to make paraphrasic errors.     MEDICATIONS  (STANDING):  atorvastatin 80 milliGRAM(s) Oral at bedtime  chlorhexidine 2% Cloths 1 Application(s) Topical <User Schedule>  enoxaparin Injectable 40 milliGRAM(s) SubCutaneous every 24 hours  insulin lispro (ADMELOG) corrective regimen sliding scale   SubCutaneous Before meals and at bedtime  melatonin 5 milliGRAM(s) Oral at bedtime  metoprolol succinate ER 12.5 milliGRAM(s) Oral two times a day  pantoprazole  Injectable 40 milliGRAM(s) IV Push daily    MEDICATIONS  (PRN):  acetaminophen     Tablet .. 650 milliGRAM(s) Oral every 6 hours PRN Temp greater or equal to 38C (100.4F), Mild Pain (1 - 3)  ondansetron Injectable 4 milliGRAM(s) IV Push every 6 hours PRN Nausea and/or Vomiting  senna 2 Tablet(s) Oral at bedtime PRN Constipation      Allergies    penicillin (Unknown)    Intolerances        Vital Signs Last 24 Hrs  T(C): 36.6 (15 Jul 2022 09:07), Max: 37.1 (15 Jul 2022 05:19)  T(F): 97.8 (15 Jul 2022 09:07), Max: 98.8 (15 Jul 2022 05:19)  HR: 56 (15 Jul 2022 08:45) (33 - 84)  BP: 124/76 (15 Jul 2022 08:45) (124/76 - 194/86)  BP(mean): 94 (15 Jul 2022 08:45) (94 - 124)  RR: 17 (15 Jul 2022 08:45) (17 - 18)  SpO2: 95% (15 Jul 2022 08:45) (93% - 97%)    Parameters below as of 15 Jul 2022 08:45  Patient On (Oxygen Delivery Method): room air        Physical exam:  General: No acute distress, awake and alert  Eyes: Anicteric sclerae, moist conjunctivae, see below for CNs  Neck: trachea midline, FROM, supple, no thyromegaly or lymphadenopathy  Cardiovascular: Irregular rate and rhythm   Pulmonary: No increased work of breathing. No use of accessory muscles  GI: Abdomen soft, non-distended, non-tender  Extremities: no edema    Neurologic:  -Mental status: Awake, alert, oriented to person, place, and time. Speech with paraphrasic errors, slow (improved fluency when distracted and not focused on language) and some word finding. Able to complete cross commands. No dysarthria.   -Cranial nerves:   II: Visual fields are full to confrontation.  III, IV, VI: Extraocular movements are intact without nystagmus. Pupils rough and reactive to light, L ~3mm, R~4mm  V:  Facial sensation V1-V3 equal and intact   VII: Face is symmetric with normal eye closure and smile  VIII: Hearing is grossly intact bilaterally.   Motor: Normal bulk and tone. No pronator drift. Mild finger curl with subtle pronation in the RUE 4+/5, LUE 5/5. Bilateral lower extremities 5/5.  Sensation: Intact to light touch bilaterally. No neglect or extinction on double simultaneous testing.  Coordination: Mild dysmetria right arm on FTN.       LABS:                        12.3   9.52  )-----------( 262      ( 15 Jul 2022 07:08 )             37.1     07-15    141  |  106  |  17  ----------------------------<  107<H>  4.1   |  25  |  1.05    Ca    8.6      15 Jul 2022 07:08  Phos  2.8     07-15  Mg     2.0     07-15        Urinalysis Basic - ( 2022 21:51 )    Color: Yellow / Appearance: Clear / S.015 / pH: x  Gluc: x / Ketone: NEGATIVE  / Bili: Negative / Urobili: 0.2 E.U./dL   Blood: x / Protein: NEGATIVE mg/dL / Nitrite: NEGATIVE   Leuk Esterase: NEGATIVE / RBC: Many /HPF / WBC < 5 /HPF   Sq Epi: x / Non Sq Epi: 0-5 /HPF / Bacteria: Present /HPF        RADIOLOGY & ADDITIONAL TESTS:     Neurology Stroke Progress Note    INTERVAL HPI/OVERNIGHT EVENTS:  Attempted to take patient to MRI overnight. Unfortunately, SBP 190s x multiple tries. Transport cancelled. SBP down to 140s with without intervention. Also noted to be bradycardic to the 40s intermittently, asymptomatic. Voiding on his own, but continues to retain with bladder scan 800cc, 600cc, straight cath x2. Patient seen and examined, wife at bedside. No complaints. Wife thinks with some language improvement, but pt does continue to make paraphrasic errors.     MEDICATIONS  (STANDING):  atorvastatin 80 milliGRAM(s) Oral at bedtime  chlorhexidine 2% Cloths 1 Application(s) Topical <User Schedule>  enoxaparin Injectable 40 milliGRAM(s) SubCutaneous every 24 hours  insulin lispro (ADMELOG) corrective regimen sliding scale   SubCutaneous Before meals and at bedtime  melatonin 5 milliGRAM(s) Oral at bedtime  metoprolol succinate ER 12.5 milliGRAM(s) Oral two times a day  pantoprazole  Injectable 40 milliGRAM(s) IV Push daily    MEDICATIONS  (PRN):  acetaminophen     Tablet .. 650 milliGRAM(s) Oral every 6 hours PRN Temp greater or equal to 38C (100.4F), Mild Pain (1 - 3)  ondansetron Injectable 4 milliGRAM(s) IV Push every 6 hours PRN Nausea and/or Vomiting  senna 2 Tablet(s) Oral at bedtime PRN Constipation      Allergies    penicillin (Unknown)    Intolerances        Vital Signs Last 24 Hrs  T(C): 36.6 (15 Jul 2022 09:07), Max: 37.1 (15 Jul 2022 05:19)  T(F): 97.8 (15 Jul 2022 09:07), Max: 98.8 (15 Jul 2022 05:19)  HR: 56 (15 Jul 2022 08:45) (33 - 84)  BP: 124/76 (15 Jul 2022 08:45) (124/76 - 194/86)  BP(mean): 94 (15 Jul 2022 08:45) (94 - 124)  RR: 17 (15 Jul 2022 08:45) (17 - 18)  SpO2: 95% (15 Jul 2022 08:45) (93% - 97%)    Parameters below as of 15 Jul 2022 08:45  Patient On (Oxygen Delivery Method): room air        Physical exam:  General: No acute distress, awake and alert  Eyes: Anicteric sclerae, moist conjunctivae, see below for CNs  Neck: trachea midline, FROM, supple, no thyromegaly or lymphadenopathy  Cardiovascular: Irregular rate and rhythm   Pulmonary: No increased work of breathing. No use of accessory muscles  GI: Abdomen soft, non-distended, non-tender  Extremities: no edema    Neurologic:  -Mental status: Awake, alert, oriented to person, place, and time. Speech with paraphrasic errors, slow (improved fluency when distracted and not focused on language) and some word finding. Able to complete cross commands. No dysarthria.   -Cranial nerves:   II: Visual fields are full to confrontation.  III, IV, VI: Extraocular movements are intact without nystagmus. Pupils round and reactive to light, L ~3mm, R~4mm  V:  Facial sensation V1-V3 equal and intact   VII: Face is symmetric with normal eye closure and smile  VIII: Hearing is grossly intact bilaterally.   Motor: Normal bulk and tone. No pronator drift. Mild finger curl with subtle pronation in the RUE 4+/5, LUE 5/5. Bilateral lower extremities 5/5.  Sensation: Intact to light touch bilaterally. No neglect or extinction on double simultaneous testing.  Coordination: Mild dysmetria right arm on FTN.       LABS:                        12.3   9.52  )-----------( 262      ( 15 Jul 2022 07:08 )             37.1     07-15    141  |  106  |  17  ----------------------------<  107<H>  4.1   |  25  |  1.05    Ca    8.6      15 Jul 2022 07:08  Phos  2.8     07-15  Mg     2.0     07-15        Urinalysis Basic - ( 2022 21:51 )    Color: Yellow / Appearance: Clear / S.015 / pH: x  Gluc: x / Ketone: NEGATIVE  / Bili: Negative / Urobili: 0.2 E.U./dL   Blood: x / Protein: NEGATIVE mg/dL / Nitrite: NEGATIVE   Leuk Esterase: NEGATIVE / RBC: Many /HPF / WBC < 5 /HPF   Sq Epi: x / Non Sq Epi: 0-5 /HPF / Bacteria: Present /HPF        RADIOLOGY & ADDITIONAL TESTS:

## 2022-07-16 LAB
ANION GAP SERPL CALC-SCNC: 9 MMOL/L — SIGNIFICANT CHANGE UP (ref 5–17)
BUN SERPL-MCNC: 14 MG/DL — SIGNIFICANT CHANGE UP (ref 7–23)
CALCIUM SERPL-MCNC: 8.8 MG/DL — SIGNIFICANT CHANGE UP (ref 8.4–10.5)
CHLORIDE SERPL-SCNC: 107 MMOL/L — SIGNIFICANT CHANGE UP (ref 96–108)
CO2 SERPL-SCNC: 26 MMOL/L — SIGNIFICANT CHANGE UP (ref 22–31)
CREAT SERPL-MCNC: 1 MG/DL — SIGNIFICANT CHANGE UP (ref 0.5–1.3)
EGFR: 78 ML/MIN/1.73M2 — SIGNIFICANT CHANGE UP
GLUCOSE SERPL-MCNC: 99 MG/DL — SIGNIFICANT CHANGE UP (ref 70–99)
HCT VFR BLD CALC: 37.9 % — LOW (ref 39–50)
HGB BLD-MCNC: 13.2 G/DL — SIGNIFICANT CHANGE UP (ref 13–17)
MAGNESIUM SERPL-MCNC: 1.8 MG/DL — SIGNIFICANT CHANGE UP (ref 1.6–2.6)
MCHC RBC-ENTMCNC: 31.7 PG — SIGNIFICANT CHANGE UP (ref 27–34)
MCHC RBC-ENTMCNC: 34.8 GM/DL — SIGNIFICANT CHANGE UP (ref 32–36)
MCV RBC AUTO: 91.1 FL — SIGNIFICANT CHANGE UP (ref 80–100)
NRBC # BLD: 0 /100 WBCS — SIGNIFICANT CHANGE UP (ref 0–0)
PHOSPHATE SERPL-MCNC: 2.6 MG/DL — SIGNIFICANT CHANGE UP (ref 2.5–4.5)
PLATELET # BLD AUTO: 285 K/UL — SIGNIFICANT CHANGE UP (ref 150–400)
POTASSIUM SERPL-MCNC: 3.7 MMOL/L — SIGNIFICANT CHANGE UP (ref 3.5–5.3)
POTASSIUM SERPL-SCNC: 3.7 MMOL/L — SIGNIFICANT CHANGE UP (ref 3.5–5.3)
RBC # BLD: 4.16 M/UL — LOW (ref 4.2–5.8)
RBC # FLD: 12.3 % — SIGNIFICANT CHANGE UP (ref 10.3–14.5)
SODIUM SERPL-SCNC: 142 MMOL/L — SIGNIFICANT CHANGE UP (ref 135–145)
WBC # BLD: 9.6 K/UL — SIGNIFICANT CHANGE UP (ref 3.8–10.5)
WBC # FLD AUTO: 9.6 K/UL — SIGNIFICANT CHANGE UP (ref 3.8–10.5)

## 2022-07-16 PROCEDURE — 99232 SBSQ HOSP IP/OBS MODERATE 35: CPT

## 2022-07-16 PROCEDURE — 70551 MRI BRAIN STEM W/O DYE: CPT | Mod: 26

## 2022-07-16 PROCEDURE — 99233 SBSQ HOSP IP/OBS HIGH 50: CPT

## 2022-07-16 RX ORDER — HYDRALAZINE HCL 50 MG
5 TABLET ORAL ONCE
Refills: 0 | Status: COMPLETED | OUTPATIENT
Start: 2022-07-16 | End: 2022-07-16

## 2022-07-16 RX ORDER — LOSARTAN POTASSIUM 100 MG/1
25 TABLET, FILM COATED ORAL ONCE
Refills: 0 | Status: COMPLETED | OUTPATIENT
Start: 2022-07-16 | End: 2022-07-16

## 2022-07-16 RX ORDER — LOSARTAN POTASSIUM 100 MG/1
25 TABLET, FILM COATED ORAL DAILY
Refills: 0 | Status: DISCONTINUED | OUTPATIENT
Start: 2022-07-16 | End: 2022-07-16

## 2022-07-16 RX ORDER — LOSARTAN POTASSIUM 100 MG/1
100 TABLET, FILM COATED ORAL DAILY
Refills: 0 | Status: DISCONTINUED | OUTPATIENT
Start: 2022-07-17 | End: 2022-07-23

## 2022-07-16 RX ORDER — HYDRALAZINE HCL 50 MG
10 TABLET ORAL ONCE
Refills: 0 | Status: COMPLETED | OUTPATIENT
Start: 2022-07-16 | End: 2022-07-16

## 2022-07-16 RX ORDER — LOSARTAN POTASSIUM 100 MG/1
50 TABLET, FILM COATED ORAL ONCE
Refills: 0 | Status: COMPLETED | OUTPATIENT
Start: 2022-07-16 | End: 2022-07-16

## 2022-07-16 RX ADMIN — PANTOPRAZOLE SODIUM 40 MILLIGRAM(S): 20 TABLET, DELAYED RELEASE ORAL at 05:57

## 2022-07-16 RX ADMIN — LOSARTAN POTASSIUM 25 MILLIGRAM(S): 100 TABLET, FILM COATED ORAL at 14:44

## 2022-07-16 RX ADMIN — Medication 5 MILLIGRAM(S): at 22:39

## 2022-07-16 RX ADMIN — Medication 5 MILLIGRAM(S): at 19:53

## 2022-07-16 RX ADMIN — LOSARTAN POTASSIUM 50 MILLIGRAM(S): 100 TABLET, FILM COATED ORAL at 20:59

## 2022-07-16 RX ADMIN — Medication 12.5 MILLIGRAM(S): at 05:52

## 2022-07-16 RX ADMIN — Medication 5 MILLIGRAM(S): at 15:23

## 2022-07-16 RX ADMIN — LOSARTAN POTASSIUM 25 MILLIGRAM(S): 100 TABLET, FILM COATED ORAL at 18:38

## 2022-07-16 RX ADMIN — FAMOTIDINE 40 MILLIGRAM(S): 10 INJECTION INTRAVENOUS at 13:17

## 2022-07-16 RX ADMIN — ATORVASTATIN CALCIUM 80 MILLIGRAM(S): 80 TABLET, FILM COATED ORAL at 22:38

## 2022-07-16 RX ADMIN — TAMSULOSIN HYDROCHLORIDE 0.4 MILLIGRAM(S): 0.4 CAPSULE ORAL at 22:38

## 2022-07-16 RX ADMIN — Medication 5 MILLIGRAM(S): at 18:38

## 2022-07-16 RX ADMIN — Medication 10 MILLIGRAM(S): at 13:53

## 2022-07-16 NOTE — PROGRESS NOTE ADULT - SUBJECTIVE AND OBJECTIVE BOX
Physical Medicine and Rehabilitation Progress Note :    Patient is a 76y old  Male who presents with a chief complaint of Left MCA stroke (15 Jul 2022 20:16)      HPI:  75 y/o male non-smoker w/ pmHx of Afib (on Xarelto), CVD (mid LAD, mid RCA, OM1, OM2, OM3 stents 1402-8359), HTN, HLD, GERD, and COVID infection 7/8/22 (tx w/ Paxlovid) presented to ED for unwitnessed fall around 10:15am 7/12/22. Patient's spouse states she heard a bang in a separate room at the house and found her  on the carpet. Patient was conscious, facedown, non-verbal, and moving only one of his arms (spouse could not recall right or left). No head trauma, LOC, or seizure activity was witnessed. This has never happened before.      Upon arrival to ED, patient had L gaze, aphasic, R sided plegia. CTH with hyperdense left MCA which was suspicious for thrombus and acute left MCA territory infarct. CTA with occlusion of the midportion of the left M1 segment to the proximal M2 segment. CTP with abnormal perfusion left MCA mismatch. BP in ED 170s/100.    Of note, wife reports they just got back from a 3 week trip from [facility] and were on a 12 hour flight on 7/7. Patient tested positive for COVID on 7/8 and was prescribed Paxlovid. Wife states patient usually takes his xarelto and other medications every morning around 9 or 10am, is unsure if he took them today. Patient is receiving care with cardiologist Dr. Brett Raya of [facility] ([phone]). Patient is s/p mid LAD stent (April 2019), mid RCA stent (2016), and OM1-OM3 stents (April 2014).     Decision was made to take patient to thrombectomy with verbal consent from spouse. Patient was urgently taken to cath lab. (12 Jul 2022 13:03)                            13.2   9.60  )-----------( 285      ( 16 Jul 2022 05:30 )             37.9       07-16    142  |  107  |  14  ----------------------------<  99  3.7   |  26  |  1.00    Ca    8.8      16 Jul 2022 05:30  Phos  2.6     07-16  Mg     1.8     07-16      Vital Signs Last 24 Hrs  T(C): 37 (16 Jul 2022 11:13), Max: 37.1 (15 Jul 2022 13:35)  T(F): 98.6 (16 Jul 2022 11:13), Max: 98.8 (15 Jul 2022 13:35)  HR: 80 (16 Jul 2022 11:47) (64 - 94)  BP: 154/88 (16 Jul 2022 11:47) (122/70 - 177/81)  BP(mean): 115 (16 Jul 2022 11:47) (89 - 125)  RR: 18 (16 Jul 2022 11:47) (16 - 18)  SpO2: 97% (16 Jul 2022 11:47) (92% - 98%)    Parameters below as of 16 Jul 2022 11:47  Patient On (Oxygen Delivery Method): room air        MEDICATIONS  (STANDING):  atorvastatin 80 milliGRAM(s) Oral at bedtime  chlorhexidine 2% Cloths 1 Application(s) Topical <User Schedule>  enoxaparin Injectable 40 milliGRAM(s) SubCutaneous every 24 hours  famotidine    Tablet 40 milliGRAM(s) Oral daily  melatonin 5 milliGRAM(s) Oral at bedtime  metoprolol succinate ER 12.5 milliGRAM(s) Oral daily  pantoprazole    Tablet 40 milliGRAM(s) Oral before breakfast  tamsulosin 0.4 milliGRAM(s) Oral at bedtime    MEDICATIONS  (PRN):  acetaminophen     Tablet .. 650 milliGRAM(s) Oral every 6 hours PRN Temp greater or equal to 38C (100.4F), Mild Pain (1 - 3)  ondansetron Injectable 4 milliGRAM(s) IV Push every 6 hours PRN Nausea and/or Vomiting  senna 2 Tablet(s) Oral at bedtime PRN Constipation    Currently Undergoing Physical/ Occupational Therapy at bedside    PT/OT Functional Status Assessment :   7/15/2022      Cognitive/Neuro/Behavioral  Level of Consciousness: alert  Arousal Level: arouses to voice  Orientation: oriented x 4  Speech: expressive aphasia  Mood/Behavior: cooperative;  slightly impulsive    Language Assistance  Preferred Language to Address Healthcare Preferred Language to Address Healthcare: English    Therapeutic Interventions      Bed Mobility  Bed Mobility Training Scooting: independent;  in chair    Sit-Stand Transfer Training  Transfer Training Sit-to-Stand Transfer: contact guard  Transfer Training Stand-to-Sit Transfer: contact guard  Sit-to-Stand Transfer Training Transfer Safety Analysis: decreased weight-shifting ability;  slightly impulsive    Gait Training  Gait Training: supervsion;  40 feet in room  Gait Analysis: 2-point gait   decreased weight-shifting ability;  SLightly unsteady 2/2 impulsive behavior. Adequate step length / art noted. Good aerobic endurance, no SOB    Therapeutic Exercise  Therapeutic Exercise Detail: Standing heel raises 1x10, Standing hip marches 1x10, STanding knee flex 1x10, Standing BUE functional reach     Neuromuscular Re-education  Neuromuscular Re-education Detail: Romberg assessed - good balance w/ normal stance (Eyes open and eyes closed, and w/ narrow LILIANA (eyes open and eyes closed). However, pt w/ difficulty initiating tandem stance, requires leaning on PT to maintain standing balance.         Therapeutic Exercise  Therapeutic Exercise Detail: Patient functionally ambulated in the room 30 x 2 feet with CGA and no AD. Patient impulsive and increased pacing requiring mod verbal/tactile cues for safety, environmental awareness, positioning and posture.     Toilet Training  Toilet Training Assistance: contact guard;  verbal cues;  Patient performed toileting tasks and hygiene while standing in front of toilet with CGA;  decreased flexibility;  impaired balance;  decreased strength;  impaired postural control          PM&R Impression : as above    Current Disposition Plan Recommendations :    acute rehab placement

## 2022-07-16 NOTE — PROGRESS NOTE ADULT - ASSESSMENT
76y Male with PMHx of Afib (on Xarelto), CVD (mid LAD, mid RCA, OM1, OM2, OM3 stents 4839-9809), HTN, HLD, GERD, and COVID infection 7/8/22 (tx w/ Paxlovid) presents to ED by EMS, found to be a thrombectomy case with L gaze, aphasia, R sided plegia. CTH showed hyperdense L MCA sign, confirmed on CTA. CTP showed significant mismatch. Patient was not a tpa candidate as he had likely taken Xeralto within 3 hours prior to arrival. S/p thrombectomy TICI 0 to 3. Of note, L ICA dissection (non-occlusive) was noted, likely iatrogenic. Stroke etiology more likely due to being off xarelto when patient was on paxlovid. CTH with SAH vs. contrast extravasation on L sylvian fissure.     Neuro  #CVA workup  - holding home xarelto for now, likely to switch to eliquis when cleared to start   - continue atorvastatin 80mg daily   - q4hr stroke neuro checks and vitals  - Stroke Code HCT Results: Hyperdense left MCA which is suspicious for thrombus and acute left MCA territory infarct.  - Stroke Code CTA Results: Occlusion of the midportion of the left M1 segment to the proximal M2 segment and high-grade narrowing involving the origin of the left anterior temporal artery. Focal area of mild to moderate narrowing involving the origin of the right vertebral artery. No carotid stenosis.  - repeat CTH with SAH vs. contrast extravasation on L sylvian fissure.  - MRI confirms SAH  - CTH 7/15 with SAH present, continue to hold AC/antithrombotics.   - PT/OT/SLP  - Stroke education    Cards  #HTN  - -160  - c/w metoprolol succinate 12.5mg daily (takes BID at home), continuing to hold home losartan 100mg daily (most likely will restart tomorrow)  - echo:  Limited study obtained for evaluation of left ventricular function. Normal left and right ventricular size and systolic function. Mildly dilated left atrium. No pericardial effusion. The left ventricle is normal in size and systolic function with a calculated ejection fraction of 65%.    #bradycardia  Bradycardic down to the 30s 7/15. Asymptomatic  - ekg 7/15 with NSR with APC  - obtain rpt ekg am 7/16    #afib  - holding AC  - halved home dose of metoprolol due to bradycardia      #HLD  - high dose statin as above in CVA  - LDL results: 54    Pulm  - call provider if SPO2 < 94%    GI  #Nutrition/Fluids/Electrolytes   - replete K<4 and Mg <2  - Diet: regular  - cont home PPI    Renal  - daily BMP    Infectious Disease  COVID + 7/8, started on paxlovid OSH  - afebrile  - COVID+, isolation precautions  - CXR: Heart size within normal limits, thoracic aortic calcification. Lungs and mediastinum are unremarkable. Thoracic spine degenerative changes, dextroscoliosis. Elevation of the right hemidiaphragm.    Endocrine  - A1C results: 5.6  - TSH results: 0.975    Urology  #urinary retention  Voiding, but continues to retain requiring straight cath x3  - initially declines saleh, but eventually agreed if another straight cath needed  - start tamsulosin 0.4mg tonight    DVT Prophylaxis  - SCDs and lovenox (started 7/13) for DVT prophylaxis   - LE dopplers negative    Dispo: acute rehab. Since his initial COVID test was an at home rapid, 10 day isolation period required for transfer to acute rehab began on 7/12 from positive in-house PCR test. Will need to remain in isolation until at least 7/22.     PT/OT: AR     Discussed daily hospital plans and goals with patient and family at bedside.    Discussed with Neurology Attending Dr. Raya     76y Male with PMHx of Afib (on Xarelto), CVD (mid LAD, mid RCA, OM1, OM2, OM3 stents 2559-4800), HTN, HLD, GERD, and COVID infection 7/8/22 (tx w/ Paxlovid) presents to ED by EMS, found to be a thrombectomy case with L gaze, aphasia, R sided plegia. CTH showed hyperdense L MCA sign, confirmed on CTA. CTP showed significant mismatch. Patient was not a tpa candidate as he had likely taken Xeralto within 3 hours prior to arrival. S/p thrombectomy TICI 0 to 3. Of note, L ICA dissection (non-occlusive) was noted, likely iatrogenic. Stroke etiology more likely due to being off xarelto when patient was on paxlovid. CTH with SAH vs. contrast extravasation on L sylvian fissure. MRI confirmed SAH.    Neuro  #CVA workup  - holding home xarelto for now, likely to switch to eliquis when cleared to start   - continue atorvastatin 80mg daily   - q4hr stroke neuro checks and vitals  - Stroke Code HCT Results: Hyperdense left MCA which is suspicious for thrombus and acute left MCA territory infarct.  - Stroke Code CTA Results: Occlusion of the midportion of the left M1 segment to the proximal M2 segment and high-grade narrowing involving the origin of the left anterior temporal artery. Focal area of mild to moderate narrowing involving the origin of the right vertebral artery. No carotid stenosis.  - repeat CTH with SAH vs. contrast extravasation on L sylvian fissure.  - CTH 7/15 with SAH present, continue to hold AC/antithrombotics.   - MRI confirms SAH  - PT/OT/SLP  - Stroke education    Cards  #HTN  - -160  - c/w metoprolol succinate 12.5mg daily (takes BID at home),  Restarted 25mg losartan (home dose is 100mg). Can increase gradually.  - echo:  Limited study obtained for evaluation of left ventricular function. Normal left and right ventricular size and systolic function. Mildly dilated left atrium. No pericardial effusion. The left ventricle is normal in size and systolic function with a calculated ejection fraction of 65%.    #bradycardia  Bradycardic down to the 30s 7/15. Asymptomatic  - ekg 7/15 with NSR with APC  - f/u EKG 7/16    #afib  - holding AC  - halved home dose of metoprolol due to bradycardia      #HLD  - high dose statin as above in CVA  - LDL results: 54    Pulm  - call provider if SPO2 < 94%    GI  #Nutrition/Fluids/Electrolytes   - replete K<4 and Mg <2  - Diet: regular  - cont home PPI    Renal  - daily BMP    Infectious Disease  COVID + 7/8, started on paxlovid OSH  - afebrile  - COVID+, isolation precautions  - CXR: Heart size within normal limits, thoracic aortic calcification. Lungs and mediastinum are unremarkable. Thoracic spine degenerative changes, dextroscoliosis. Elevation of the right hemidiaphragm.    Endocrine  - A1C results: 5.6  - TSH results: 0.975    Urology  #urinary retention  Voiding, but continues to retain requiring straight cath x3  - initially declines saleh, but eventually agreed if another straight cath needed - saleh in place  - start tamsulosin 0.4mg tonight    DVT Prophylaxis  - SCDs and lovenox (started 7/13) for DVT prophylaxis   - LE dopplers negative    Dispo: acute rehab. Since his initial COVID test was an at home rapid, 10 day isolation period required for transfer to acute rehab began on 7/12 from positive in-house PCR test. Will need to remain in isolation until at least 7/22.     PT/OT: AR     Discussed daily hospital plans and goals with patient and family at bedside.    Discussed with Neurology Attending Dr. Mondragon     76y Male with PMHx of Afib (on Xarelto), CVD (mid LAD, mid RCA, OM1, OM2, OM3 stents 5076-6054), HTN, HLD, GERD, and COVID infection 7/8/22 (tx w/ Paxlovid) presents to ED by EMS, found to be a thrombectomy case with L gaze, aphasia, R sided plegia. CTH showed hyperdense L MCA sign, confirmed on CTA. CTP showed significant mismatch. Patient was not a tpa candidate as he had likely taken Xeralto within 3 hours prior to arrival. S/p thrombectomy TICI 0 to 3. Of note, L ICA dissection (non-occlusive) was noted, likely iatrogenic. Stroke etiology more likely due to being off xarelto when patient was on paxlovid. CTH with SAH vs. contrast extravasation on L sylvian fissure. MRI confirmed SAH.    Neuro  #CVA workup  - holding home xarelto for now, likely to switch to eliquis when cleared to start   - continue atorvastatin 80mg daily   - q4hr stroke neuro checks and vitals  - Stroke Code HCT Results: Hyperdense left MCA which is suspicious for thrombus and acute left MCA territory infarct.  - Stroke Code CTA Results: Occlusion of the midportion of the left M1 segment to the proximal M2 segment and high-grade narrowing involving the origin of the left anterior temporal artery. Focal area of mild to moderate narrowing involving the origin of the right vertebral artery. No carotid stenosis.  - repeat CTH with SAH vs. contrast extravasation on L sylvian fissure.  - CTH 7/15 with SAH present, continue to hold AC/antithrombotics.   - MRI confirms SAH  - PT/OT/SLP  - Stroke education    Cards  #HTN  - -160  - c/w metoprolol succinate 12.5mg daily (takes BID at home),  25mg losartan x 2 doses (home dose is 100mg), 50mg starting tomorrow, low threshold to start 100mg tomorrow depending on BP tonight. received 3 pushes IV 5mg hydral for SBP > 160 without changes in exam.   - echo:  Limited study obtained for evaluation of left ventricular function. Normal left and right ventricular size and systolic function. Mildly dilated left atrium. No pericardial effusion. The left ventricle is normal in size and systolic function with a calculated ejection fraction of 65%.    #bradycardia  Bradycardic down to the 30s 7/15. Asymptomatic  - ekg 7/15 with NSR with APC  - EKG 7/16 NSR    #afib  - holding AC  - halved home dose of metoprolol due to bradycardia      #HLD  - high dose statin as above in CVA  - LDL results: 54    Pulm  - call provider if SPO2 < 94%    GI  #Nutrition/Fluids/Electrolytes   - replete K<4 and Mg <2  - Diet: regular  - cont home PPI    Renal  - daily BMP    Infectious Disease  COVID + 7/8, started on paxlovid OSH  - afebrile  - COVID+, isolation precautions  - CXR: Heart size within normal limits, thoracic aortic calcification. Lungs and mediastinum are unremarkable. Thoracic spine degenerative changes, dextroscoliosis. Elevation of the right hemidiaphragm.    Endocrine  - A1C results: 5.6  - TSH results: 0.975    Urology  #urinary retention  Voiding, but continues to retain requiring straight cath x3  - initially declines saleh, but eventually agreed if another straight cath needed - saleh in place  - start tamsulosin 0.4mg tonight    DVT Prophylaxis  - SCDs and lovenox (started 7/13) for DVT prophylaxis   - LE dopplers negative    Dispo: acute rehab. Since his initial COVID test was an at home rapid, 10 day isolation period required for transfer to acute rehab began on 7/12 from positive in-house PCR test. Will need to remain in isolation until at least 7/22.     PT/OT: AR     Discussed daily hospital plans and goals with patient and family at bedside.    Discussed with Neurology Attending Dr. Mondragon

## 2022-07-16 NOTE — PROGRESS NOTE ADULT - ASSESSMENT
76y Male with PMHx of Afib (on Xarelto), CVD (mid LAD, mid RCA, OM1, OM2, OM3 stents [id number]), HTN, HLD, GERD, and COVID infection 7/8/22 (tx w/ Paxlovid) presents to ED by EMS, found to be a thrombectomy case with L gaze, aphasia, R sided plegia. CTH showed hyperdense L MCA sign, confirmed on CTA. CTP showed significant mismatch. Patient was not a tpa candidate as he had likely taken Xeralto within 3 hours prior to arrival. S/p thrombectomy. MRI confirmed SAH and acute left MCA ischemia    #Acute left MCA CVA with SAH  - continue holding home xarelto for now   - continue atorvastatin 80mg daily   - CTH: Hyperdense left MCA which is suspicious for thrombus and acute left MCA territory infarct.  - CTA: Occlusion of the midportion of the left M1 segment to the proximal M2 segment and high-grade narrowing involving the origin of the left anterior temporal artery. Focal area of mild to moderate narrowing involving the origin of the right vertebral artery. No carotid stenosis.  - Repeat CTH with SAH vs. contrast extravasation on L sylvian fissure.  - CTH 7/15 with SAH present, continue to hold AC/antithrombotics.   - MRI confirms SAH + acute left MCA ischemia  - Physical therapy/Occupational therapy, acute rehab  - SLP    #Hypertension  - Remains with elevated BP readings  - Continue metoprolol succinate 12.5mg daily  - Start losartan 25mg daily and increase gradually (home dose is 100mg)  - TTE: Limited study, no PFO EF 65%    #Bradycardia (resolved)  - Bradycardic down to the 30s 7/15. Asymptomatic  - EKG 7/15 with NSR with APC  - EKG 7/16 NSR    #Atrial fibrillation  - holding AC  - halved home dose of metoprolol due to bradycardia    #Hyperlipidemia   - continue atorvastatin 80mg daily     #COVID-19  - COVID + 7/8, started on paxlovid OSH  - Afebrile  - Isolation precautions  - CXR: Heart size within normal limits, thoracic aortic calcification. Lungs and mediastinum are unremarkable. Thoracic spine degenerative changes, dextroscoliosis. Elevation of the right hemidiaphragm.    #Urinary retention  - Voiding, but continues to retain requiring straight cath x3. Rodriguez now in place  - start tamsulosin 0.4mg qhs

## 2022-07-16 NOTE — PROGRESS NOTE ADULT - SUBJECTIVE AND OBJECTIVE BOX
Patient reports feeling well overall. Would like to go home. No acute overnight events.    Vital signs:  T(C): 36.4 (07-16-22 @ 15:08), Max: 37 (07-16-22 @ 11:13)  HR: 78 (07-16-22 @ 16:30) (64 - 94)  BP: 162/86 (07-16-22 @ 16:30) (122/70 - 181/85)  RR: 16 (07-16-22 @ 16:30) (16 - 18)  SpO2: 97% (07-16-22 @ 16:30) (92% - 97%)    Physical Examination:  General: elderly male, in NAD  CV: S1, S2, RRR  Lungs: CTABL, no wheezing  Abd: soft, NT/ND, +BS  Ext: No edema  Neuro: AAOx3, dysarthria noted. Strength grossly intact. Sensation intact.    Labs:                        13.2   9.60  )-----------( 285      ( 16 Jul 2022 05:30 )             37.9   07-16    142  |  107  |  14  ----------------------------<  99  3.7   |  26  |  1.00    Ca    8.8      16 Jul 2022 05:30  Phos  2.6     07-16  Mg     1.8     07-16    imaging reviewed    Medications:  MEDICATIONS  (STANDING):  atorvastatin 80 milliGRAM(s) Oral at bedtime  chlorhexidine 2% Cloths 1 Application(s) Topical <User Schedule>  enoxaparin Injectable 40 milliGRAM(s) SubCutaneous every 24 hours  famotidine    Tablet 40 milliGRAM(s) Oral daily  losartan 25 milliGRAM(s) Oral daily  melatonin 5 milliGRAM(s) Oral at bedtime  metoprolol succinate ER 12.5 milliGRAM(s) Oral daily  pantoprazole    Tablet 40 milliGRAM(s) Oral before breakfast  tamsulosin 0.4 milliGRAM(s) Oral at bedtime    MEDICATIONS  (PRN):  acetaminophen     Tablet .. 650 milliGRAM(s) Oral every 6 hours PRN Temp greater or equal to 38C (100.4F), Mild Pain (1 - 3)  ondansetron Injectable 4 milliGRAM(s) IV Push every 6 hours PRN Nausea and/or Vomiting  senna 2 Tablet(s) Oral at bedtime PRN Constipation

## 2022-07-16 NOTE — PROGRESS NOTE ADULT - ASSESSMENT
per Neurology    76y o Male with PMHx of Afib (on Xarelto), CVD (mid LAD, mid RCA, OM1, OM2, OM3 stents 4413-1514), HTN, HLD, GERD, and COVID infection 7/8/22 (tx w/ Paxlovid) presents to ED by EMS, found to be a thrombectomy case with L gaze, aphasia, R sided plegia. CTH showed hyperdense L MCA sign, confirmed on CTA. CTP showed significant mismatch. Patient was not a tpa candidate as he had likely taken Xeralto within 3 hours prior to arrival. S/p thrombectomy TICI 0 to 3. Of note, L ICA dissection (non-occlusive) was noted, likely iatrogenic. Stroke etiology more likely due to being off xarelto when patient was on paxlovid. CTH with SAH vs. contrast extravasation on L sylvian fissure.     Neuro  #CVA workup  - holding home xarelto for now, likely to switch to eliquis when cleared to start   - continue atorvastatin 80mg daily   - q4hr stroke neuro checks and vitals  - Stroke Code HCT Results: Hyperdense left MCA which is suspicious for thrombus and acute left MCA territory infarct.  - Stroke Code CTA Results: Occlusion of the midportion of the left M1 segment to the proximal M2 segment and high-grade narrowing involving the origin of the left anterior temporal artery. Focal area of mild to moderate narrowing involving the origin of the right vertebral artery. No carotid stenosis.  - repeat CTH with SAH vs. contrast extravasation on L sylvian fissure.  - f/u MRI w/o contrast with GRE. If unable, will obtain repeat CTH non con. - CTH 7/15 with SAH present, continue to hold AC/antithrombotics.   - PT/OT/SLP  - Stroke education    Cards  #HTN  - -160  - c/w metoprolol succinate 12.5mg daily (takes BID at home), continuing to hold home losartan 100mg daily (most likely will restart tomorrow)  - echo:  Limited study obtained for evaluation of left ventricular function. Normal left and right ventricular size and systolic function. Mildly dilated left atrium. No pericardial effusion. The left ventricle is normal in size and systolic function with a calculated ejection fraction of 65%.    #bradycardia  Bradycardic down to the 30s 7/15. Asymptomatic  - ekg 7/15 with NSR with APC  - obtain rpt ekg am 7/16    #afib  - holding AC  - halved home dose of metoprolol due to bradycardia      #HLD  - high dose statin as above in CVA  - LDL results: 54    Pulm  - call provider if SPO2 < 94%    GI  #Nutrition/Fluids/Electrolytes   - replete K<4 and Mg <2  - Diet: regular  - cont home PPI    Renal  - daily BMP    Infectious Disease  COVID + 7/8, started on paxlovid OSH  - afebrile  - COVID+, isolation precautions  - CXR: Heart size within normal limits, thoracic aortic calcification. Lungs and mediastinum are unremarkable. Thoracic spine degenerative changes, dextroscoliosis. Elevation of the right hemidiaphragm.    Endocrine  - A1C results: 5.6  - TSH results: 0.975    Urology  #urinary retention  Voiding, but continues to retain requiring straight cath x3  - initially declines saleh, but eventually agreed if another straight cath needed  - start tamsulosin 0.4mg tonight    DVT Prophylaxis  - SCDs and lovenox (started 7/13) for DVT prophylaxis   - LE dopplers negative    Dispo: acute rehab. Since his initial COVID test was an at home rapid, 10 day isolation period required for transfer to acute rehab began on 7/12 from positive in-house PCR test. Will need to remain in isolation until at least 7/22. 76y Male with PMHx of Afib (on Xarelto), CVD (mid LAD, mid RCA, OM1, OM2, OM3 stents 2782-1229), HTN, HLD, GERD, and COVID infection 7/8/22 (tx w/ Paxlovid) presents to ED by EMS, found to be a thrombectomy case with L gaze, aphasia, R sided plegia. CTH showed hyperdense L MCA sign, confirmed on CTA. CTP showed significant mismatch. Patient was not a tpa candidate as he had likely taken Xeralto within 3 hours prior to arrival. S/p thrombectomy TICI 0 to 3. Of note, L ICA dissection (non-occlusive) was noted, likely iatrogenic. Stroke etiology more likely due to being off xarelto when patient was on paxlovid. CTH with SAH vs. contrast extravasation on L sylvian fissure.     Neuro  #CVA workup  - holding home xarelto for now, likely to switch to eliquis when cleared to start   - continue atorvastatin 80mg daily   - q4hr stroke neuro checks and vitals  - Stroke Code HCT Results: Hyperdense left MCA which is suspicious for thrombus and acute left MCA territory infarct.  - Stroke Code CTA Results: Occlusion of the midportion of the left M1 segment to the proximal M2 segment and high-grade narrowing involving the origin of the left anterior temporal artery. Focal area of mild to moderate narrowing involving the origin of the right vertebral artery. No carotid stenosis.  - repeat CTH with SAH vs. contrast extravasation on L sylvian fissure.  - f/u MRI w/o contrast with GRE. If unable, will obtain repeat CTH non con. - CTH 7/15 with SAH present, continue to hold AC/antithrombotics.   - PT/OT/SLP  - Stroke education    Cards  #HTN  - -160  - c/w metoprolol succinate 12.5mg daily (takes BID at home), continuing to hold home losartan 100mg daily (most likely will restart tomorrow)  - echo:  Limited study obtained for evaluation of left ventricular function. Normal left and right ventricular size and systolic function. Mildly dilated left atrium. No pericardial effusion. The left ventricle is normal in size and systolic function with a calculated ejection fraction of 65%.    #bradycardia  Bradycardic down to the 30s 7/15. Asymptomatic  - ekg 7/15 with NSR with APC  - obtain rpt ekg am 7/16    #afib  - holding AC  - halved home dose of metoprolol due to bradycardia      #HLD  - high dose statin as above in CVA  - LDL results: 54    Pulm  - call provider if SPO2 < 94%    GI  #Nutrition/Fluids/Electrolytes   - replete K<4 and Mg <2  - Diet: regular  - cont home PPI    Renal  - daily BMP    Infectious Disease  COVID + 7/8, started on paxlovid OSH  - afebrile  - COVID+, isolation precautions  - CXR: Heart size within normal limits, thoracic aortic calcification. Lungs and mediastinum are unremarkable. Thoracic spine degenerative changes, dextroscoliosis. Elevation of the right hemidiaphragm.    Endocrine  - A1C results: 5.6  - TSH results: 0.975    Urology  #urinary retention  Voiding, but continues to retain requiring straight cath x3  - initially declines saleh, but eventually agreed if another straight cath needed  - start tamsulosin 0.4mg tonight    DVT Prophylaxis  - SCDs and lovenox (started 7/13) for DVT prophylaxis   - LE dopplers negative    Dispo: acute rehab. Since his initial COVID test was an at home rapid, 10 day isolation period required for transfer to acute rehab began on 7/12 from positive in-house PCR test. Will need to remain in isolation until at least 7/22.

## 2022-07-16 NOTE — PROGRESS NOTE ADULT - SUBJECTIVE AND OBJECTIVE BOX
Neurology Stroke Progress Note    INTERVAL HPI/OVERNIGHT EVENTS:  Patient seen and examined. Restarted 25mg losartan (home dose is 100mg), received 2 pushes IV 5mg hydral for SBP > 160 without changes in exam. MRI confirms SAH.   Patient denies any acute neurological complaints. Wants to go home.     MEDICATIONS  (STANDING):  atorvastatin 80 milliGRAM(s) Oral at bedtime  chlorhexidine 2% Cloths 1 Application(s) Topical <User Schedule>  enoxaparin Injectable 40 milliGRAM(s) SubCutaneous every 24 hours  famotidine    Tablet 40 milliGRAM(s) Oral daily  hydrALAZINE Injectable 5 milliGRAM(s) IV Push once  losartan 25 milliGRAM(s) Oral daily  melatonin 5 milliGRAM(s) Oral at bedtime  metoprolol succinate ER 12.5 milliGRAM(s) Oral daily  pantoprazole    Tablet 40 milliGRAM(s) Oral before breakfast  tamsulosin 0.4 milliGRAM(s) Oral at bedtime    MEDICATIONS  (PRN):  acetaminophen     Tablet .. 650 milliGRAM(s) Oral every 6 hours PRN Temp greater or equal to 38C (100.4F), Mild Pain (1 - 3)  ondansetron Injectable 4 milliGRAM(s) IV Push every 6 hours PRN Nausea and/or Vomiting  senna 2 Tablet(s) Oral at bedtime PRN Constipation      Allergies    penicillin (Unknown)    Intolerances        Vital Signs Last 24 Hrs  T(C): 36.4 (2022 15:08), Max: 37 (2022 11:13)  T(F): 97.6 (2022 15:08), Max: 98.6 (2022 11:13)  HR: 86 (2022 13:44) (64 - 94)  BP: 169/82 (2022 13:44) (122/70 - 177/81)  BP(mean): 118 (2022 13:44) (89 - 125)  RR: 16 (2022 13:15) (16 - 18)  SpO2: 96% (2022 13:44) (92% - 98%)    Parameters below as of 2022 13:44  Patient On (Oxygen Delivery Method): room air        Physical exam:  Neurologic:  -Mental status: Awake, alert, oriented to person, place, and time. Speech with paraphrasic errors, slow (improved fluency when distracted and not focused on language) and some word finding. Able to complete cross commands. No dysarthria.   -Cranial nerves:   II: Visual fields are full to confrontation.  III, IV, VI: Extraocular movements are intact without nystagmus. Pupils round and reactive to light, L ~3mm, R~4mm  V:  Facial sensation V1-V3 equal and intact   VII: Face is symmetric with normal eye closure and smile  VIII: Hearing is grossly intact bilaterally.   Motor: Normal bulk and tone. No pronator drift. Mild finger curl with subtle pronation in the RUE 4+/5, LUE 5/5. Bilateral lower extremities 5/5.  Sensation: Intact to light touch bilaterally. No neglect or extinction on double simultaneous testing.  Coordination: Mild dysmetria right arm on FTN.     LABS:                        13.2   9.60  )-----------( 285      ( 2022 05:30 )             37.9     07-16    142  |  107  |  14  ----------------------------<  99  3.7   |  26  |  1.00    Ca    8.8      2022 05:30  Phos  2.6     07-16  Mg     1.8     07-16        Urinalysis Basic - ( 2022 21:51 )    Color: Yellow / Appearance: Clear / S.015 / pH: x  Gluc: x / Ketone: NEGATIVE  / Bili: Negative / Urobili: 0.2 E.U./dL   Blood: x / Protein: NEGATIVE mg/dL / Nitrite: NEGATIVE   Leuk Esterase: NEGATIVE / RBC: Many /HPF / WBC < 5 /HPF   Sq Epi: x / Non Sq Epi: 0-5 /HPF / Bacteria: Present /HPF        RADIOLOGY & ADDITIONAL TESTS:  < from: MR Head No Cont (22 @ 13:45) >  IMPRESSION: Multifocal areas of acute ischemia along the left MCA   territory. Scattered areas of left-sided subarachnoid hemorrhage.    < end of copied text >

## 2022-07-17 LAB
ANION GAP SERPL CALC-SCNC: 13 MMOL/L — SIGNIFICANT CHANGE UP (ref 5–17)
BUN SERPL-MCNC: 11 MG/DL — SIGNIFICANT CHANGE UP (ref 7–23)
CALCIUM SERPL-MCNC: 9.5 MG/DL — SIGNIFICANT CHANGE UP (ref 8.4–10.5)
CHLORIDE SERPL-SCNC: 102 MMOL/L — SIGNIFICANT CHANGE UP (ref 96–108)
CO2 SERPL-SCNC: 24 MMOL/L — SIGNIFICANT CHANGE UP (ref 22–31)
CREAT SERPL-MCNC: 0.94 MG/DL — SIGNIFICANT CHANGE UP (ref 0.5–1.3)
EGFR: 83 ML/MIN/1.73M2 — SIGNIFICANT CHANGE UP
GLUCOSE SERPL-MCNC: 106 MG/DL — HIGH (ref 70–99)
HCT VFR BLD CALC: 43.8 % — SIGNIFICANT CHANGE UP (ref 39–50)
HGB BLD-MCNC: 15.2 G/DL — SIGNIFICANT CHANGE UP (ref 13–17)
MAGNESIUM SERPL-MCNC: 1.7 MG/DL — SIGNIFICANT CHANGE UP (ref 1.6–2.6)
MCHC RBC-ENTMCNC: 31.7 PG — SIGNIFICANT CHANGE UP (ref 27–34)
MCHC RBC-ENTMCNC: 34.7 GM/DL — SIGNIFICANT CHANGE UP (ref 32–36)
MCV RBC AUTO: 91.3 FL — SIGNIFICANT CHANGE UP (ref 80–100)
NRBC # BLD: 0 /100 WBCS — SIGNIFICANT CHANGE UP (ref 0–0)
PHOSPHATE SERPL-MCNC: 3 MG/DL — SIGNIFICANT CHANGE UP (ref 2.5–4.5)
PLATELET # BLD AUTO: 393 K/UL — SIGNIFICANT CHANGE UP (ref 150–400)
POTASSIUM SERPL-MCNC: 3.5 MMOL/L — SIGNIFICANT CHANGE UP (ref 3.5–5.3)
POTASSIUM SERPL-SCNC: 3.5 MMOL/L — SIGNIFICANT CHANGE UP (ref 3.5–5.3)
RBC # BLD: 4.8 M/UL — SIGNIFICANT CHANGE UP (ref 4.2–5.8)
RBC # FLD: 12.2 % — SIGNIFICANT CHANGE UP (ref 10.3–14.5)
SODIUM SERPL-SCNC: 139 MMOL/L — SIGNIFICANT CHANGE UP (ref 135–145)
WBC # BLD: 8.02 K/UL — SIGNIFICANT CHANGE UP (ref 3.8–10.5)
WBC # FLD AUTO: 8.02 K/UL — SIGNIFICANT CHANGE UP (ref 3.8–10.5)

## 2022-07-17 PROCEDURE — 99233 SBSQ HOSP IP/OBS HIGH 50: CPT

## 2022-07-17 PROCEDURE — 99232 SBSQ HOSP IP/OBS MODERATE 35: CPT

## 2022-07-17 RX ORDER — METOPROLOL TARTRATE 50 MG
12.5 TABLET ORAL ONCE
Refills: 0 | Status: DISCONTINUED | OUTPATIENT
Start: 2022-07-17 | End: 2022-07-17

## 2022-07-17 RX ORDER — METOPROLOL TARTRATE 50 MG
12.5 TABLET ORAL ONCE
Refills: 0 | Status: COMPLETED | OUTPATIENT
Start: 2022-07-17 | End: 2022-07-17

## 2022-07-17 RX ORDER — METOPROLOL TARTRATE 50 MG
12.5 TABLET ORAL
Refills: 0 | Status: DISCONTINUED | OUTPATIENT
Start: 2022-07-18 | End: 2022-07-18

## 2022-07-17 RX ORDER — MAGNESIUM SULFATE 500 MG/ML
2 VIAL (ML) INJECTION ONCE
Refills: 0 | Status: COMPLETED | OUTPATIENT
Start: 2022-07-17 | End: 2022-07-17

## 2022-07-17 RX ORDER — POTASSIUM CHLORIDE 20 MEQ
40 PACKET (EA) ORAL ONCE
Refills: 0 | Status: COMPLETED | OUTPATIENT
Start: 2022-07-17 | End: 2022-07-17

## 2022-07-17 RX ORDER — POTASSIUM CHLORIDE 20 MEQ
10 PACKET (EA) ORAL
Refills: 0 | Status: DISCONTINUED | OUTPATIENT
Start: 2022-07-17 | End: 2022-07-17

## 2022-07-17 RX ADMIN — TAMSULOSIN HYDROCHLORIDE 0.4 MILLIGRAM(S): 0.4 CAPSULE ORAL at 21:19

## 2022-07-17 RX ADMIN — ATORVASTATIN CALCIUM 80 MILLIGRAM(S): 80 TABLET, FILM COATED ORAL at 21:19

## 2022-07-17 RX ADMIN — Medication 12.5 MILLIGRAM(S): at 06:01

## 2022-07-17 RX ADMIN — FAMOTIDINE 40 MILLIGRAM(S): 10 INJECTION INTRAVENOUS at 11:15

## 2022-07-17 RX ADMIN — Medication 40 MILLIEQUIVALENT(S): at 11:16

## 2022-07-17 RX ADMIN — Medication 12.5 MILLIGRAM(S): at 17:38

## 2022-07-17 RX ADMIN — CHLORHEXIDINE GLUCONATE 1 APPLICATION(S): 213 SOLUTION TOPICAL at 06:01

## 2022-07-17 RX ADMIN — Medication 25 GRAM(S): at 11:16

## 2022-07-17 RX ADMIN — Medication 5 MILLIGRAM(S): at 21:18

## 2022-07-17 RX ADMIN — LOSARTAN POTASSIUM 100 MILLIGRAM(S): 100 TABLET, FILM COATED ORAL at 06:05

## 2022-07-17 RX ADMIN — PANTOPRAZOLE SODIUM 40 MILLIGRAM(S): 20 TABLET, DELAYED RELEASE ORAL at 06:01

## 2022-07-17 NOTE — CONSULT NOTE ADULT - SUBJECTIVE AND OBJECTIVE BOX
HPI:  77 y/o male non-smoker w/ pmHx of Afib (on Xarelto), CVD (mid LAD, mid RCA, OM1, OM2, OM3 stents 2594-2291), HTN, HLD, GERD, and COVID infection 7/8/22 (tx w/ Paxlovid) presented to ED for unwitnessed fall around 10:15am 7/12/22. Patient's spouse states she heard a bang in a separate room at the house and found her  on the carpet. Patient was conscious, facedown, non-verbal, and moving only one of his arms (spouse could not recall right or left). No head trauma, LOC, or seizure activity was witnessed. This has never happened before.      Upon arrival to ED, patient had L gaze, aphasic, R sided plegia. CTH with hyperdense left MCA which was suspicious for thrombus and acute left MCA territory infarct. CTA with occlusion of the midportion of the left M1 segment to the proximal M2 segment. CTP with abnormal perfusion left MCA mismatch. BP in ED 170s/100.    Of note, wife reports they just got back from a 3 week trip from [facility] and were on a 12 hour flight on 7/7. Patient tested positive for COVID on 7/8 and was prescribed Paxlovid. Wife states patient usually takes his xarelto and other medications every morning around 9 or 10am, is unsure if he took them today. Patient is receiving care with cardiologist Dr. Brett Raya of [place] ([phone]). Patient is s/p mid LAD stent (April 2019), mid RCA stent (2016), and OM1-OM3 stents (April 2014).     Decision was made to take patient to thrombectomy with verbal consent from spouse. Patient was urgently taken to cath lab. (12 Jul 2022 13:03)      ROS: A 10-point review of systems was otherwise negative.    PAST MEDICAL & SURGICAL HISTORY:  Afib      HTN (hypertension)      GERD (gastroesophageal reflux disease)      HLD (hyperlipidemia)      CVD (cardiovascular disease)      S/P ablation of atrial fibrillation      Presence of stent in LAD coronary artery      S/P right coronary artery (RCA) stent placement      S/P arterial stent        SOCIAL HISTORY:  FAMILY HISTORY:    ALLERGIES: 	  penicillin (Unknown)          MEDICATIONS:  acetaminophen     Tablet .. 650 milliGRAM(s) Oral every 6 hours PRN  atorvastatin 80 milliGRAM(s) Oral at bedtime  chlorhexidine 2% Cloths 1 Application(s) Topical <User Schedule>  famotidine    Tablet 40 milliGRAM(s) Oral daily  losartan 100 milliGRAM(s) Oral daily  melatonin 5 milliGRAM(s) Oral at bedtime  metoprolol succinate ER 12.5 milliGRAM(s) Oral once  ondansetron Injectable 4 milliGRAM(s) IV Push every 6 hours PRN  pantoprazole    Tablet 40 milliGRAM(s) Oral before breakfast  senna 2 Tablet(s) Oral at bedtime PRN  tamsulosin 0.4 milliGRAM(s) Oral at bedtime      PHYSICAL EXAM:  T(C): 35.9 (07-17-22 @ 10:00), Max: 36.8 (07-16-22 @ 17:44)  HR: 88 (07-17-22 @ 12:17) (68 - 115)  BP: 155/78 (07-17-22 @ 12:17) (135/79 - 182/96)  RR: 17 (07-17-22 @ 12:17) (16 - 18)  SpO2: 98% (07-17-22 @ 12:17) (92% - 98%)  Wt(kg): --    GEN: Awake, comfortable. NAD.   HEENT: NCAT  RESP: CTA b/l  CV: RRR  ABD: Soft  EXT: Warm. No edema   NEURO: AAOx3, but still with aphasia difficulty finding words, better with writing words down     I&O's Summary    16 Jul 2022 07:01  -  17 Jul 2022 07:00  --------------------------------------------------------  IN: 0 mL / OUT: 2700 mL / NET: -2700 mL        LABS:	 	                        15.2   8.02  )-----------( 393      ( 17 Jul 2022 05:30 )             43.8     07-17    139  |  102  |  11  ----------------------------<  106<H>  3.5   |  24  |  0.94    Ca    9.5      17 Jul 2022 05:30  Phos  3.0     07-17  Mg     1.7     07-17    Limited echo: -----  CONCLUSIONS:     1. Limited study obtained for evaluation of left ventricular function.   2. Normal left and right ventricular size and systolic function.   3. Mildly dilated left atrium.   4. No pericardial effusion.

## 2022-07-17 NOTE — PATIENT PROFILE ADULT - FALL HARM RISK - HARM RISK INTERVENTIONS
Assistance with ambulation/Assistance OOB with selected safe patient handling equipment/Communicate Risk of Fall with Harm to all staff/Reinforce activity limits and safety measures with patient and family/Tailored Fall Risk Interventions/Use of alarms - bed, chair and/or voice tab/Visual Cue: Yellow wristband and red socks/Bed in lowest position, wheels locked, appropriate side rails in place/Call bell, personal items and telephone in reach/Instruct patient to call for assistance before getting out of bed or chair/Non-slip footwear when patient is out of bed/[place] to call system/Physically safe environment - no spills, clutter or unnecessary equipment/Purposeful Proactive Rounding/Room/bathroom lighting operational, light cord in reach

## 2022-07-17 NOTE — CONSULT NOTE ADULT - ASSESSMENT
76y Male with PMHx of Afib s/p ablation in 2021 (on Xarelto), CVD (mid LAD, mid RCA, OM1, OM2, OM3 stents 6228-0866), HTN, HLD, GERD, and COVID infection 7/8/22 (tx w/ Paxlovid) presents to ED by EMS, found to be a thrombectomy case with L gaze, aphasia, R sided plegia. CTH showed hyperdense L MCA sign, confirmed on CTA. CTP showed significant mismatch. Patient was not a tpa candidate as he had likely taken Xeralto within 3 hours prior to arrival. S/p thrombectomy. MRI confirmed SAH and acute left MCA ischemia. Cardiology was consulted 2/2 AIVR of 11 seconds seen on telemetry.     # AIVR seen on telemetry   EKG: NSR, diffused t wave inversions present on admission ekg as well  Tele: AIVR at 100-120 bpm run of 11 seconds, PVCs, NSR, occasionally sinus bradycardia  Patient reported he was asymptomatic during the event, but was aware of it since he noted it on his apple watch.   Patient denies CP, SOB, or any exertional sxs, said he's ET was very good prior to the stroke.  Nahid any complains right now, sitting in a chair, euvolemic  Follows up with a cardiologist at [facility].  ECHO limited: normal LV and RV function, mildly dilated LA, no effusion   C/w BB, ok with switching succinate to tartrate.   Restart AC, when ok per primary team in regards to SAH    Preliminary note, follow up attending attestation  76y Male with PMHx of Afib s/p ablation in 2021 (on Xarelto), CVD (mid LAD, mid RCA, OM1, OM2, OM3 stents 9948-4047), HTN, HLD, GERD, and COVID infection 7/8/22 (tx w/ Paxlovid) presents to ED by EMS, found to be a thrombectomy case with L gaze, aphasia, R sided plegia. CTH showed hyperdense L MCA sign, confirmed on CTA. CTP showed significant mismatch. Patient was not a tpa candidate as he had likely taken Xeralto within 3 hours prior to arrival. S/p thrombectomy. MRI confirmed SAH and acute left MCA ischemia. Cardiology was consulted 2/2 AIVR of 11 seconds seen on telemetry.     # AIVR seen on telemetry   EKG: NSR, diffused t wave inversions present on admission ekg as well  Tele: AIVR at 100-120 bpm run of 11 seconds, PVCs, NSR, occasionally sinus bradycardia  Patient reported he was asymptomatic during the event, but was aware of it since he noted it on his apple watch.   Patient denies CP, SOB, or any exertional sxs, said he's ET was very good prior to the stroke.  Nahid any complains right now, sitting in a chair, euvolemic  Follows up with a cardiologist at [facility].  Please obtain records from patient's cardiologist regarding prior ekgs, ischemic work up etc.   ECHO limited: normal LV and RV function, mildly dilated LA, no effusion   C/w BB, ok with switching succinate to tartrate.   Restart AC, when ok per primary team in regards to SAH    Preliminary note, follow up attending attestation  76y Male with PMHx of Afib s/p ablation in 2021 (on Xarelto), CVD (mid LAD, mid RCA, OM1, OM2, OM3 stents 4508-2112), HTN, HLD, GERD, and COVID infection 7/8/22 (tx w/ Paxlovid) presents to ED by EMS, found to be a thrombectomy case with L gaze, aphasia, R sided plegia. CTH showed hyperdense L MCA sign, confirmed on CTA. CTP showed significant mismatch. Patient was not a tpa candidate as he had likely taken Xeralto within 3 hours prior to arrival. S/p thrombectomy. MRI confirmed SAH and acute left MCA ischemia. Cardiology was consulted 2/2 AIVR of 11 seconds seen on telemetry.     # AIVR vs NSVT seen on telemetry   EKG: NSR, diffused t wave inversions present on admission ekg as well  Tele: AIVR vs NSTV at 100-120 bpm run of 11 seconds, PVCs, NSR, occasionally sinus bradycardia  Patient reported he was asymptomatic during the event, but was aware of it since he noted it on his apple watch.   Patient denies CP, SOB, or any exertional sxs, said he's ET was very good prior to the stroke.  Nahid any complains right now, sitting in a chair, euvolemic  Follows up with a cardiologist at [facility].  Please obtain records from patient's cardiologist regarding prior ekgs, ischemic work up etc.   ECHO limited: normal LV and RV function, mildly dilated LA, no effusion   C/w BB, ok with switching succinate to tartrate.   Restart AC, when ok per primary team in regards to SAH  Restart asa as soon as ok per primary team    Preliminary note, follow up attending attestation

## 2022-07-17 NOTE — CONSULT NOTE ADULT - TIME BILLING
review of patient information including recent vital signs, labs, imaging, and notes; assessing, examining patient; updating patient/family; discussion and coordination of care with multidisciplinary team.
as noted above

## 2022-07-17 NOTE — PROGRESS NOTE ADULT - SUBJECTIVE AND OBJECTIVE BOX
Neurology Stroke Progress Note    INTERVAL HPI/OVERNIGHT EVENTS:  Patient seen and examined. Was restarted on home losartan dose (100mg). Cardiology was consulted 2/2 AIVR of 11 seconds seen on telemetry. EKG: NSR, diffused t wave inversions present on admission ekg as well. C/w BB, ok with switching succinate to tartrate.   Denies acute neurological complaints. Encouraged to speak more and read the newspaper to his wife. Reassured patient that his speech is improving on a daily basis.       MEDICATIONS  (STANDING):  atorvastatin 80 milliGRAM(s) Oral at bedtime  chlorhexidine 2% Cloths 1 Application(s) Topical <User Schedule>  famotidine    Tablet 40 milliGRAM(s) Oral daily  losartan 100 milliGRAM(s) Oral daily  melatonin 5 milliGRAM(s) Oral at bedtime  metoprolol succinate ER 12.5 milliGRAM(s) Oral once  pantoprazole    Tablet 40 milliGRAM(s) Oral before breakfast  tamsulosin 0.4 milliGRAM(s) Oral at bedtime    MEDICATIONS  (PRN):  acetaminophen     Tablet .. 650 milliGRAM(s) Oral every 6 hours PRN Temp greater or equal to 38C (100.4F), Mild Pain (1 - 3)  ondansetron Injectable 4 milliGRAM(s) IV Push every 6 hours PRN Nausea and/or Vomiting  senna 2 Tablet(s) Oral at bedtime PRN Constipation      Allergies    penicillin (Unknown)    Intolerances        Vital Signs Last 24 Hrs  T(C): 35.6 (17 Jul 2022 14:00), Max: 36.8 (16 Jul 2022 17:44)  T(F): 96 (17 Jul 2022 14:00), Max: 98.3 (16 Jul 2022 17:44)  HR: 88 (17 Jul 2022 12:17) (68 - 115)  BP: 155/78 (17 Jul 2022 12:17) (135/79 - 182/96)  BP(mean): 111 (17 Jul 2022 12:17) (103 - 132)  RR: 17 (17 Jul 2022 12:17) (16 - 18)  SpO2: 98% (17 Jul 2022 12:17) (92% - 98%)    Parameters below as of 17 Jul 2022 12:17  Patient On (Oxygen Delivery Method): room air        Physical exam:  Neurologic:  -Mental status: Awake, alert, oriented to person, place, and time. Speech with paraphrasic errors, slow (improved fluency when distracted and not focused on language) and some word finding. Able to complete cross commands. No dysarthria.   -Cranial nerves:   II: Visual fields are full to confrontation.  III, IV, VI: Extraocular movements are intact without nystagmus. Pupils round and reactive to light, L ~3mm, R~4mm  V:  Facial sensation V1-V3 equal and intact   VII: Face is symmetric with normal eye closure and smile  VIII: Hearing is grossly intact bilaterally.   Motor: Normal bulk and tone. No pronator drift. Mild finger curl with subtle pronation in the RUE 4+/5, LUE 5/5. Bilateral lower extremities 5/5.  Sensation: Intact to light touch bilaterally. No neglect or extinction on double simultaneous testing.  Coordination: Mild dysmetria right arm on FTN.     LABS:                        15.2   8.02  )-----------( 393      ( 17 Jul 2022 05:30 )             43.8     07-17    139  |  102  |  11  ----------------------------<  106<H>  3.5   |  24  |  0.94    Ca    9.5      17 Jul 2022 05:30  Phos  3.0     07-17  Mg     1.7     07-17      RADIOLOGY & ADDITIONAL TESTS:    < from: MR Head No Cont (07.16.22 @ 13:45) >  IMPRESSION: Multifocal areas of acute ischemia along the left MCA   territory. Scattered areas of left-sided subarachnoid hemorrhage.    < end of copied text >

## 2022-07-17 NOTE — PATIENT PROFILE ADULT - FUNCTIONAL ASSESSMENT - BASIC MOBILITY 6.
3-calculated by average/Not able to assess (calculate score using [facility] averaging method)

## 2022-07-17 NOTE — PROGRESS NOTE ADULT - ASSESSMENT
76M with Afib (on Xarelto), CVD (mid LAD, mid RCA, OM1, OM2, OM3 stents [id number]), HTN, HLD, GERD, and COVID infection 7/8/22 (tx w/ Paxlovid) presents to ED by EMS, found to be a thrombectomy case with L gaze, aphasia, R sided plegia. CTH showed hyperdense L MCA sign, confirmed on CTA. CTP showed significant mismatch. Patient was not a tpa candidate as he had likely taken Xeralto within 3 hours prior to arrival. S/p thrombectomy. MRI confirmed SAH and acute left MCA ischemia. Noted to have episodes of accelerated ventricular rhythm with episodes of sinus bradycardia on tele. However, when asked he reports being asymptomatic. EKG shows NSR with T-wave inversions, unchanged from prior. Cardiology consulted.    #Acute left MCA CVA with SAH  - continue holding home xarelto for now   - continue atorvastatin 80mg daily   - CTH: Hyperdense left MCA which is suspicious for thrombus and acute left MCA territory infarct.  - CTA: Occlusion of the midportion of the left M1 segment to the proximal M2 segment and high-grade narrowing involving the origin of the left anterior temporal artery. Focal area of mild to moderate narrowing involving the origin of the right vertebral artery. No carotid stenosis.  - Repeat CTH with SAH vs. contrast extravasation on L sylvian fissure.  - CTH 7/15 with SAH present, continue to hold AC/antithrombotics.   - MRI confirms SAH + acute left MCA ischemia  - Physical therapy/Occupational therapy, acute rehab  - SLP    #Hypertension  - Remains with elevated BP readings  - Metoprolol succinate 12.5mg BID (home dose)  - Increase losartan to 100mg daily   - TTE: Limited study, no PFO EF 65%    #Bradycardia  - Bradycardic down to the 30s 7/15 again noted 7/17. Asymptomatic  - EKG 7/17 with NSR with T-wave inversions, unchanged compared to prior EKGs  - Cardiology consulted, recommendations appreciated    #Atrial fibrillation  - holding AC  - Continue home dose metoprolol  - Cardiology consulted, recommendations appreciated    #Hyperlipidemia   - continue atorvastatin 80mg daily     #COVID-19  - COVID + 7/8, started on paxlovid OSH  - Afebrile  - Isolation precautions  - CXR: Heart size within normal limits, thoracic aortic calcification. Lungs and mediastinum are unremarkable. Thoracic spine degenerative changes, dextroscoliosis. Elevation of the right hemidiaphragm.    #Urinary retention  - Voiding, but continues to retain requiring straight cath x3. Rodriguez now in place  - start tamsulosin 0.4mg qhs

## 2022-07-17 NOTE — CONSULT NOTE ADULT - ATTENDING COMMENTS
Initial attending contact date7/18/22      . See fellow note written above for details. I reviewed the fellow documentation. I have personally seen and examined this patient. I reviewed vitals, labs, medications, cardiac studies, and additional imaging. I agree with the above fellow's findings and plans as written above with the following additions/statements.    76y Male with PMHx of Afib s/p ablation in 2021 (on Xarelto), CAD (mid LAD, mid RCA, OM1, OM2, OM3 stents [id number]), HTN, HLD, GERD, and COVID infection 7/8/22 (tx w/ Paxlovid) admitted with L MCA CVA, now s/p thrombectomy. MRI and CT head with evolving MCA ischemia/hemorrahge. Cardiology was consulted 2/2 AIVR of 11 seconds seen on telemetry.   -Tele reviewed: predominantly NSR with few PVCS,  11 sec of accelerated idioventricular rhythm noted   -AIVR is a benign rhythm commonly seen in reperfusion states (usually post PCI or post thrombectomy in this case). Usually resolved with time   -Pt otherwise asymptomatic from cardiac perspective and stable BP  -Cont metoprolol tartrate 12.5 mg bid (which is home dose),statin  -Awaiting official ECHO  -ASA, AC (eliquis 5 mg bid) when cleared by neuro team

## 2022-07-17 NOTE — PROGRESS NOTE ADULT - ASSESSMENT
76y Male with PMHx of Afib (on Xarelto), CVD (mid LAD, mid RCA, OM1, OM2, OM3 stents [id number]), HTN, HLD, GERD, and COVID infection 7/8/22 (tx w/ Paxlovid) presents to ED by EMS, found to be a thrombectomy case with L gaze, aphasia, R sided plegia. CTH showed hyperdense L MCA sign, confirmed on CTA. CTP showed significant mismatch. Patient was not a tpa candidate as he had likely taken Xeralto within 3 hours prior to arrival. S/p thrombectomy TICI 0 to 3. Of note, L ICA dissection (non-occlusive) was noted, likely iatrogenic. Stroke etiology more likely due to being off xarelto when patient was on paxlovid. CTH with SAH vs. contrast extravasation on L sylvian fissure. MRI confirmed SAH.    Neuro  #CVA workup  - holding home xarelto for now, likely to switch to eliquis when cleared to start   - repeat CTH tomorrow, if stable, can start lovenox for DVT prophylaxis  - continue atorvastatin 80mg daily   - q4hr stroke neuro checks and vitals  - Stroke Code HCT Results: Hyperdense left MCA which is suspicious for thrombus and acute left MCA territory infarct.  - Stroke Code CTA Results: Occlusion of the midportion of the left M1 segment to the proximal M2 segment and high-grade narrowing involving the origin of the left anterior temporal artery. Focal area of mild to moderate narrowing involving the origin of the right vertebral artery. No carotid stenosis.  - repeat CTH with SAH vs. contrast extravasation on L sylvian fissure.  - CTH 7/15 with SAH present, continue to hold AC/antithrombotics.   - MRI confirms left SAH and multifocal areas of ischemia along left MCA territory  - PT/OT/SLP  - Stroke education    Cards  #HTN  - -160  - c/w metoprolol succinate 12.5mg daily (takes BID at home),  25mg losartan x 2 doses (home dose is 100mg), 50mg starting tomorrow, low threshold to start 100mg tomorrow depending on BP tonight. received 3 pushes IV 5mg hydral for SBP > 160 without changes in exam.   - echo:  Limited study obtained for evaluation of left ventricular function. Normal left and right ventricular size and systolic function. Mildly dilated left atrium. No pericardial effusion. The left ventricle is normal in size and systolic function with a calculated ejection fraction of 65%.    #bradycardia  Bradycardic down to the 30s 7/15. Asymptomatic  - ekg 7/15 with NSR with APC  - EKG 7/16 NSR    #afib  - holding AC  - halved home dose of metoprolol due to bradycardia      #HLD  - high dose statin as above in CVA  - LDL results: 54    Pulm  - call provider if SPO2 < 94%    GI  #Nutrition/Fluids/Electrolytes   - replete K<4 and Mg <2  - Diet: regular  - cont home PPI    Renal  - daily BMP    Infectious Disease  COVID + 7/8, started on paxlovid OSH  - afebrile  - COVID+, isolation precautions  - CXR: Heart size within normal limits, thoracic aortic calcification. Lungs and mediastinum are unremarkable. Thoracic spine degenerative changes, dextroscoliosis. Elevation of the right hemidiaphragm.    Endocrine  - A1C results: 5.6  - TSH results: 0.975    Urology  #urinary retention  Voiding, but continues to retain requiring straight cath x3  - initially declines saleh, but eventually agreed if another straight cath needed - saleh in place  - start tamsulosin 0.4mg tonight    DVT Prophylaxis  - SCDs and lovenox (started 7/13) for DVT prophylaxis   - LE dopplers negative    Dispo: acute rehab. Since his initial COVID test was an at home rapid, 10 day isolation period required for transfer to acute rehab began on 7/12 from positive in-house PCR test. Will need to remain in isolation until at least 7/22.     PT/OT: AR     Discussed daily hospital plans and goals with patient and family at bedside.    Discussed with Neurology Attending Dr. Mondragon       76y Male with PMHx of Afib (on Xarelto), CVD (mid LAD, mid RCA, OM1, OM2, OM3 stents [id number]), HTN, HLD, GERD, and COVID infection 7/8/22 (tx w/ Paxlovid) presents to ED by EMS, found to be a thrombectomy case with L gaze, aphasia, R sided plegia. CTH showed hyperdense L MCA sign, confirmed on CTA. CTP showed significant mismatch. Patient was not a tpa candidate as he had likely taken Xeralto within 3 hours prior to arrival. S/p thrombectomy TICI 0 to 3. Of note, L ICA dissection (non-occlusive) was noted, likely iatrogenic. Stroke etiology more likely due to being off xarelto when patient was on paxlovid. CTH with SAH vs. contrast extravasation on L sylvian fissure. MRI confirmed SAH and multifocal areas of ischemia along left MCA territory.    Neuro  #CVA workup  - holding home xarelto for now, likely to switch to eliquis when cleared to start   - repeat CTH tomorrow, if stable, can start lovenox for DVT prophylaxis  - continue atorvastatin 80mg daily   - q4hr stroke neuro checks and vitals  - Stroke Code HCT Results: Hyperdense left MCA which is suspicious for thrombus and acute left MCA territory infarct.  - Stroke Code CTA Results: Occlusion of the midportion of the left M1 segment to the proximal M2 segment and high-grade narrowing involving the origin of the left anterior temporal artery. Focal area of mild to moderate narrowing involving the origin of the right vertebral artery. No carotid stenosis.  - repeat CTH with SAH vs. contrast extravasation on L sylvian fissure.  - CTH 7/15 with SAH present, continue to hold AC/antithrombotics.   - MRI confirms left SAH and multifocal areas of ischemia along left MCA territory  - PT/OT/SLP  - Stroke education    Cards  #HTN  - BP goal 120-160  - c/w losartan 100mg daily (home dose)  #bradycardia  Bradycardic down to the 30s 7/15. Asymptomatic  - ekg 7/15 with NSR with APC  - EKG 7/16 NSR  # Cardiology was consulted on 7/17 for AIVR of 11 seconds seen on telemetry. Patient remained asx.  - EKG 7/17: NSR, diffused t wave inversions present on admission ekg as well  - Tele: AIVR at 100-120 bpm run of 11 seconds, PVCs, NSR, occasionally sinus bradycardia  - echo:  Limited study obtained for evaluation of left ventricular function. Normal left and right ventricular size and systolic function. Mildly dilated left atrium. No pericardial effusion. The left ventricle is normal in size and systolic function with a calculated ejection fraction of 65%.  - C/w BB, ok with switching succinate to tartrate.       #afib  - holding AC  - C/w BB, ok with switching succinate to tartrate as per cards.       #HLD  - high dose statin as above in CVA  - LDL results: 54    Pulm  - call provider if SPO2 < 94%    GI  #Nutrition/Fluids/Electrolytes   - replete K<4 and Mg <2  - Diet: regular  - cont home PPI    Renal  - daily BMP    Infectious Disease  COVID + 7/8, started on paxlovid OSH  - afebrile  - COVID+, isolation precautions  - CXR: Heart size within normal limits, thoracic aortic calcification. Lungs and mediastinum are unremarkable. Thoracic spine degenerative changes, dextroscoliosis. Elevation of the right hemidiaphragm.    Endocrine  - A1C results: 5.6  - TSH results: 0.975    Urology  #urinary retention  Voiding, but continues to retain requiring straight cath x3  - initially declines saleh, but eventually agreed if another straight cath needed - saleh in place  - c/w tamsulosin 0.4mg     DVT Prophylaxis  - SCDs   - LE dopplers negative    Dispo: acute rehab. Since his initial COVID test was an at home rapid, 10 day isolation period required for transfer to acute rehab began on 7/12 from positive in-house PCR test. Will need to remain in isolation until at least 7/22.     PT/OT: AR     Discussed daily hospital plans and goals with patient and family at bedside.    Discussed with Neurology Attending Dr. Mondragon

## 2022-07-17 NOTE — PROGRESS NOTE ADULT - SUBJECTIVE AND OBJECTIVE BOX
No acute overnight events. Reports feeling well overall and would like to go home. Denies fevers, chills, chest pain, headaches, dizziness, abdominal pain, nausea or vomiting.    Vital signs:  T(C): 35.9 (07-17-22 @ 10:00), Max: 36.8 (07-16-22 @ 17:44)  HR: 88 (07-17-22 @ 12:17) (68 - 115)  BP: 155/78 (07-17-22 @ 12:17) (135/79 - 182/96)  RR: 17 (07-17-22 @ 12:17) (16 - 18)  SpO2: 98% (07-17-22 @ 12:17) (92% - 98%)    Physical Examination:  General: elderly male, in NAD  CV: S1, S2, no murmurs  Lungs: CTABL, no wheezing  Abd: soft, NT/ND, +BS. Rodriguez catheter in place  Ext: No edema  Neuro: AAOx3, dysarthria noted. Strength grossly intact. Sensation intact.    Labs:                        15.2   8.02  )-----------( 393      ( 17 Jul 2022 05:30 )             43.8   07-17    139  |  102  |  11  ----------------------------<  106<H>  3.5   |  24  |  0.94    Ca    9.5      17 Jul 2022 05:30  Phos  3.0     07-17  Mg     1.7     07-17    Imaging reviewed    Medications:  MEDICATIONS  (STANDING):  atorvastatin 80 milliGRAM(s) Oral at bedtime  chlorhexidine 2% Cloths 1 Application(s) Topical <User Schedule>  famotidine    Tablet 40 milliGRAM(s) Oral daily  losartan 100 milliGRAM(s) Oral daily  melatonin 5 milliGRAM(s) Oral at bedtime  metoprolol succinate ER 12.5 milliGRAM(s) Oral once  pantoprazole    Tablet 40 milliGRAM(s) Oral before breakfast  tamsulosin 0.4 milliGRAM(s) Oral at bedtime    MEDICATIONS  (PRN):  acetaminophen     Tablet .. 650 milliGRAM(s) Oral every 6 hours PRN Temp greater or equal to 38C (100.4F), Mild Pain (1 - 3)  ondansetron Injectable 4 milliGRAM(s) IV Push every 6 hours PRN Nausea and/or Vomiting  senna 2 Tablet(s) Oral at bedtime PRN Constipation

## 2022-07-18 LAB
ANION GAP SERPL CALC-SCNC: 12 MMOL/L — SIGNIFICANT CHANGE UP (ref 5–17)
ANION GAP SERPL CALC-SCNC: 9 MMOL/L — SIGNIFICANT CHANGE UP (ref 5–17)
APTT BLD: 27.3 SEC — LOW (ref 27.5–35.5)
APTT BLD: 49.3 SEC — HIGH (ref 27.5–35.5)
BUN SERPL-MCNC: 15 MG/DL — SIGNIFICANT CHANGE UP (ref 7–23)
BUN SERPL-MCNC: 18 MG/DL — SIGNIFICANT CHANGE UP (ref 7–23)
CALCIUM SERPL-MCNC: 8.8 MG/DL — SIGNIFICANT CHANGE UP (ref 8.4–10.5)
CALCIUM SERPL-MCNC: 9.2 MG/DL — SIGNIFICANT CHANGE UP (ref 8.4–10.5)
CHLORIDE SERPL-SCNC: 103 MMOL/L — SIGNIFICANT CHANGE UP (ref 96–108)
CHLORIDE SERPL-SCNC: 104 MMOL/L — SIGNIFICANT CHANGE UP (ref 96–108)
CO2 SERPL-SCNC: 21 MMOL/L — LOW (ref 22–31)
CO2 SERPL-SCNC: 25 MMOL/L — SIGNIFICANT CHANGE UP (ref 22–31)
CREAT SERPL-MCNC: 0.83 MG/DL — SIGNIFICANT CHANGE UP (ref 0.5–1.3)
CREAT SERPL-MCNC: 0.94 MG/DL — SIGNIFICANT CHANGE UP (ref 0.5–1.3)
EGFR: 83 ML/MIN/1.73M2 — SIGNIFICANT CHANGE UP
EGFR: 90 ML/MIN/1.73M2 — SIGNIFICANT CHANGE UP
GLUCOSE SERPL-MCNC: 102 MG/DL — HIGH (ref 70–99)
GLUCOSE SERPL-MCNC: 107 MG/DL — HIGH (ref 70–99)
HCT VFR BLD CALC: 41.2 % — SIGNIFICANT CHANGE UP (ref 39–50)
HGB BLD-MCNC: 14 G/DL — SIGNIFICANT CHANGE UP (ref 13–17)
INR BLD: 1.01 — SIGNIFICANT CHANGE UP (ref 0.88–1.16)
MAGNESIUM SERPL-MCNC: 1.7 MG/DL — SIGNIFICANT CHANGE UP (ref 1.6–2.6)
MAGNESIUM SERPL-MCNC: 2 MG/DL — SIGNIFICANT CHANGE UP (ref 1.6–2.6)
MCHC RBC-ENTMCNC: 30.8 PG — SIGNIFICANT CHANGE UP (ref 27–34)
MCHC RBC-ENTMCNC: 34 GM/DL — SIGNIFICANT CHANGE UP (ref 32–36)
MCV RBC AUTO: 90.5 FL — SIGNIFICANT CHANGE UP (ref 80–100)
NRBC # BLD: 0 /100 WBCS — SIGNIFICANT CHANGE UP (ref 0–0)
PHOSPHATE SERPL-MCNC: 3.2 MG/DL — SIGNIFICANT CHANGE UP (ref 2.5–4.5)
PLATELET # BLD AUTO: 376 K/UL — SIGNIFICANT CHANGE UP (ref 150–400)
POTASSIUM SERPL-MCNC: 3.8 MMOL/L — SIGNIFICANT CHANGE UP (ref 3.5–5.3)
POTASSIUM SERPL-MCNC: 4 MMOL/L — SIGNIFICANT CHANGE UP (ref 3.5–5.3)
POTASSIUM SERPL-SCNC: 3.8 MMOL/L — SIGNIFICANT CHANGE UP (ref 3.5–5.3)
POTASSIUM SERPL-SCNC: 4 MMOL/L — SIGNIFICANT CHANGE UP (ref 3.5–5.3)
PROTHROM AB SERPL-ACNC: 12 SEC — SIGNIFICANT CHANGE UP (ref 10.5–13.4)
RBC # BLD: 4.55 M/UL — SIGNIFICANT CHANGE UP (ref 4.2–5.8)
RBC # FLD: 12.1 % — SIGNIFICANT CHANGE UP (ref 10.3–14.5)
SODIUM SERPL-SCNC: 136 MMOL/L — SIGNIFICANT CHANGE UP (ref 135–145)
SODIUM SERPL-SCNC: 138 MMOL/L — SIGNIFICANT CHANGE UP (ref 135–145)
WBC # BLD: 8.19 K/UL — SIGNIFICANT CHANGE UP (ref 3.8–10.5)
WBC # FLD AUTO: 8.19 K/UL — SIGNIFICANT CHANGE UP (ref 3.8–10.5)

## 2022-07-18 PROCEDURE — 99222 1ST HOSP IP/OBS MODERATE 55: CPT

## 2022-07-18 PROCEDURE — 99232 SBSQ HOSP IP/OBS MODERATE 35: CPT

## 2022-07-18 PROCEDURE — 70450 CT HEAD/BRAIN W/O DYE: CPT | Mod: 26

## 2022-07-18 PROCEDURE — 99233 SBSQ HOSP IP/OBS HIGH 50: CPT

## 2022-07-18 RX ORDER — METOPROLOL TARTRATE 50 MG
25 TABLET ORAL ONCE
Refills: 0 | Status: COMPLETED | OUTPATIENT
Start: 2022-07-18 | End: 2022-07-18

## 2022-07-18 RX ORDER — HYDRALAZINE HCL 50 MG
10 TABLET ORAL ONCE
Refills: 0 | Status: COMPLETED | OUTPATIENT
Start: 2022-07-18 | End: 2022-07-18

## 2022-07-18 RX ORDER — MAGNESIUM SULFATE 500 MG/ML
2 VIAL (ML) INJECTION ONCE
Refills: 0 | Status: COMPLETED | OUTPATIENT
Start: 2022-07-18 | End: 2022-07-18

## 2022-07-18 RX ORDER — HEPARIN SODIUM 5000 [USP'U]/ML
1000 INJECTION INTRAVENOUS; SUBCUTANEOUS
Qty: 25000 | Refills: 0 | Status: DISCONTINUED | OUTPATIENT
Start: 2022-07-18 | End: 2022-07-19

## 2022-07-18 RX ORDER — POTASSIUM CHLORIDE 20 MEQ
20 PACKET (EA) ORAL ONCE
Refills: 0 | Status: COMPLETED | OUTPATIENT
Start: 2022-07-18 | End: 2022-07-18

## 2022-07-18 RX ORDER — METOPROLOL TARTRATE 50 MG
25 TABLET ORAL
Refills: 0 | Status: DISCONTINUED | OUTPATIENT
Start: 2022-07-19 | End: 2022-07-19

## 2022-07-18 RX ORDER — LEVETIRACETAM 250 MG/1
250 TABLET, FILM COATED ORAL EVERY 12 HOURS
Refills: 0 | Status: DISCONTINUED | OUTPATIENT
Start: 2022-07-18 | End: 2022-07-21

## 2022-07-18 RX ORDER — HYDRALAZINE HCL 50 MG
5 TABLET ORAL ONCE
Refills: 0 | Status: DISCONTINUED | OUTPATIENT
Start: 2022-07-18 | End: 2022-07-18

## 2022-07-18 RX ADMIN — HEPARIN SODIUM 10 UNIT(S)/HR: 5000 INJECTION INTRAVENOUS; SUBCUTANEOUS at 17:33

## 2022-07-18 RX ADMIN — Medication 10 MILLIGRAM(S): at 06:19

## 2022-07-18 RX ADMIN — CHLORHEXIDINE GLUCONATE 1 APPLICATION(S): 213 SOLUTION TOPICAL at 06:23

## 2022-07-18 RX ADMIN — Medication 25 GRAM(S): at 22:52

## 2022-07-18 RX ADMIN — PANTOPRAZOLE SODIUM 40 MILLIGRAM(S): 20 TABLET, DELAYED RELEASE ORAL at 06:19

## 2022-07-18 RX ADMIN — Medication 20 MILLIEQUIVALENT(S): at 22:51

## 2022-07-18 RX ADMIN — ATORVASTATIN CALCIUM 80 MILLIGRAM(S): 80 TABLET, FILM COATED ORAL at 22:51

## 2022-07-18 RX ADMIN — LEVETIRACETAM 250 MILLIGRAM(S): 250 TABLET, FILM COATED ORAL at 22:51

## 2022-07-18 RX ADMIN — TAMSULOSIN HYDROCHLORIDE 0.4 MILLIGRAM(S): 0.4 CAPSULE ORAL at 22:51

## 2022-07-18 RX ADMIN — Medication 25 MILLIGRAM(S): at 19:05

## 2022-07-18 RX ADMIN — FAMOTIDINE 40 MILLIGRAM(S): 10 INJECTION INTRAVENOUS at 12:44

## 2022-07-18 RX ADMIN — Medication 12.5 MILLIGRAM(S): at 05:49

## 2022-07-18 RX ADMIN — LEVETIRACETAM 250 MILLIGRAM(S): 250 TABLET, FILM COATED ORAL at 09:06

## 2022-07-18 RX ADMIN — LOSARTAN POTASSIUM 100 MILLIGRAM(S): 100 TABLET, FILM COATED ORAL at 05:49

## 2022-07-18 NOTE — PROGRESS NOTE ADULT - SUBJECTIVE AND OBJECTIVE BOX
Vital signs:  T(C): 35.9 (07-17-22 @ 10:00), Max: 36.8 (07-16-22 @ 17:44)  HR: 88 (07-17-22 @ 12:17) (68 - 115)  BP: 155/78 (07-17-22 @ 12:17) (135/79 - 182/96)  RR: 17 (07-17-22 @ 12:17) (16 - 18)  SpO2: 98% (07-17-22 @ 12:17) (92% - 98%)    Physical Examination:  General: elderly male, in NAD  CV: S1, S2, no murmurs  Lungs: CTABL, no wheezing  Abd: soft, NT/ND, +BS. Rodriguez catheter in place  Ext: No edema  Neuro: AAOx3, dysarthria noted. Strength grossly intact. Sensation intact.    Labs:                        15.2   8.02  )-----------( 393      ( 17 Jul 2022 05:30 )             43.8   07-17    139  |  102  |  11  ----------------------------<  106<H>  3.5   |  24  |  0.94    Ca    9.5      17 Jul 2022 05:30  Phos  3.0     07-17  Mg     1.7     07-17    Imaging reviewed    Medications:  MEDICATIONS  (STANDING):  atorvastatin 80 milliGRAM(s) Oral at bedtime  chlorhexidine 2% Cloths 1 Application(s) Topical <User Schedule>  famotidine    Tablet 40 milliGRAM(s) Oral daily  losartan 100 milliGRAM(s) Oral daily  melatonin 5 milliGRAM(s) Oral at bedtime  metoprolol succinate ER 12.5 milliGRAM(s) Oral once  pantoprazole    Tablet 40 milliGRAM(s) Oral before breakfast  tamsulosin 0.4 milliGRAM(s) Oral at bedtime    MEDICATIONS  (PRN):  acetaminophen     Tablet .. 650 milliGRAM(s) Oral every 6 hours PRN Temp greater or equal to 38C (100.4F), Mild Pain (1 - 3)  ondansetron Injectable 4 milliGRAM(s) IV Push every 6 hours PRN Nausea and/or Vomiting  senna 2 Tablet(s) Oral at bedtime PRN Constipation still w word finding difficulty although improving,  wife requesting isolations precautions to be discontinued  no increased weakness    Physical Examination:  General: elderly male, in NAD  CV: S1, S2, no murmurs  Lungs: CTABL, no wheezing  Abd: soft, NT/ND, +BS. Rodriguez catheter in place  Ext: No edema  Neuro: AAOx3, dysarthria noted. Strength grossly intact. Sensation intact.

## 2022-07-18 NOTE — PROGRESS NOTE ADULT - ASSESSMENT
76y Male with PMHx of Afib (on Xarelto), CVD (mid LAD, mid RCA, OM1, OM2, OM3 stents 2898-7812), HTN, HLD, GERD, and COVID infection 7/8/22 (tx w/ Paxlovid) presents to ED by EMS, found to be a thrombectomy case with L gaze, aphasia, R sided plegia. CTH showed hyperdense L MCA sign, confirmed on CTA. CTP showed significant mismatch. Patient was not a tpa candidate as he had likely taken Xeralto within 3 hours prior to arrival. S/p thrombectomy TICI 0 to 3. Of note, L ICA dissection (non-occlusive) was noted, likely iatrogenic. Stroke etiology more likely due to being off xarelto when patient was on paxlovid. CTH with SAH vs. contrast extravasation on L sylvian fissure. MRI confirmed SAH and multifocal areas of ischemia along left MCA territory.    Neuro  #CVA workup  - holding home xarelto for now, likely to switch to eliquis when cleared to start   - repeat CTH today, if stable, can start lovenox for DVT prophylaxis  - started on keppra 250mg BID for sz ppx  - continue atorvastatin 80mg daily   - q4hr stroke neuro checks and vitals  - Stroke Code HCT Results: Hyperdense left MCA which is suspicious for thrombus and acute left MCA territory infarct.  - Stroke Code CTA Results: Occlusion of the midportion of the left M1 segment to the proximal M2 segment and high-grade narrowing involving the origin of the left anterior temporal artery. Focal area of mild to moderate narrowing involving the origin of the right vertebral artery. No carotid stenosis.  - repeat CTH with SAH vs. contrast extravasation on L sylvian fissure.  - CTH 7/15 with SAH present, continue to hold AC/antithrombotics.   - MRI confirms left SAH and multifocal areas of ischemia along left MCA territory  - PT/OT/SLP  - Stroke education    Cards  #HTN  - BP goal 120-160  - c/w losartan 100mg daily (home dose)  #bradycardia  Bradycardic down to the 30s 7/15. Asymptomatic  - ekg 7/15 with NSR with APC  - EKG 7/16 NSR  # Cardiology was consulted on 7/17 for AIVR of 11 seconds seen on telemetry. Patient remained asx.  - EKG 7/17: NSR, diffused t wave inversions present on admission ekg as well  - Tele: AIVR at 100-120 bpm run of 11 seconds, PVCs, NSR, occasionally sinus bradycardia  - echo:  Limited study obtained for evaluation of left ventricular function. Normal left and right ventricular size and systolic function. Mildly dilated left atrium. No pericardial effusion. The left ventricle is normal in size and systolic function with a calculated ejection fraction of 65%.  - C/w BB, ok with switching succinate to tartrate.       #afib  - holding AC  - C/w BB, ok with switching succinate to tartrate as per cards.       #HLD  - high dose statin as above in CVA  - LDL results: 54    Pulm  - call provider if SPO2 < 94%    GI  #Nutrition/Fluids/Electrolytes   - replete K<4 and Mg <2  - Diet: regular  - cont home PPI    Renal  - daily BMP    Infectious Disease  COVID + 7/8, started on paxlovid OSH  - afebrile  - COVID+, isolation precautions  - CXR: Heart size within normal limits, thoracic aortic calcification. Lungs and mediastinum are unremarkable. Thoracic spine degenerative changes, dextroscoliosis. Elevation of the right hemidiaphragm.    Endocrine  - A1C results: 5.6  - TSH results: 0.975    Urology  #urinary retention  Voiding, but continues to retain requiring straight cath x3  - initially declines saleh, but eventually agreed if another straight cath needed - saleh in place  - c/w tamsulosin 0.4mg     DVT Prophylaxis  - SCDs   - LE dopplers negative    Dispo: acute rehab. Since his initial COVID test was an at home rapid, 10 day isolation period required for transfer to acute rehab began on 7/12 from positive in-house PCR test. Will need to remain in isolation until at least 7/22.     PT/OT: AR     Discussed daily hospital plans and goals with patient and family at bedside.    Discussed with Neurology Attending Dr. Mondragon     76y Male with PMHx of Afib (on Xarelto), CVD (mid LAD, mid RCA, OM1, OM2, OM3 stents 3727-5284), HTN, HLD, GERD, and COVID infection 7/8/22 (tx w/ Paxlovid) presents to ED by EMS, found to be a thrombectomy case with L gaze, aphasia, R sided plegia. CTH showed hyperdense L MCA sign, confirmed on CTA. CTP showed significant mismatch. Patient was not a tpa candidate as he had likely taken Xeralto within 3 hours prior to arrival. S/p thrombectomy TICI 0 to 3. Of note, L ICA dissection (non-occlusive) was noted, likely iatrogenic. Stroke etiology more likely due to being off xarelto when patient was on paxlovid. CTH with SAH vs. contrast extravasation on L sylvian fissure. MRI confirmed SAH and multifocal areas of ischemia along left MCA territory.    Neuro  #CVA workup  - holding home xarelto for now, likely to switch to eliquis when cleared to start   - repeat CTH today, if stable, can start lovenox for DVT prophylaxis  - started on keppra 250mg BID for sz ppx  - continue atorvastatin 80mg daily   - q4hr stroke neuro checks and vitals  - Stroke Code HCT Results: Hyperdense left MCA which is suspicious for thrombus and acute left MCA territory infarct.  - Stroke Code CTA Results: Occlusion of the midportion of the left M1 segment to the proximal M2 segment and high-grade narrowing involving the origin of the left anterior temporal artery. Focal area of mild to moderate narrowing involving the origin of the right vertebral artery. No carotid stenosis.  - repeat CTH with SAH vs. contrast extravasation on L sylvian fissure.  - CTH 7/15 with SAH present, continue to hold AC/antithrombotics.   - MRI confirms left SAH and multifocal areas of ischemia along left MCA territory  - PT/OT/SLP  - Stroke education    Cards  #HTN  - BP goal 120-160  - c/w losartan 100mg daily (home dose)  #bradycardia  Bradycardic down to the 30s 7/15. Asymptomatic  - ekg 7/15 with NSR with APC  - EKG 7/16 NSR  # Cardiology was consulted on 7/17 for AIVR of 11 seconds seen on telemetry. Patient remained asx.  - EKG 7/17: NSR, diffused t wave inversions present on admission ekg as well  - Tele: AIVR at 100-120 bpm run of 11 seconds, PVCs, NSR, occasionally sinus bradycardia  - echo:  Limited study obtained for evaluation of left ventricular function. Normal left and right ventricular size and systolic function. Mildly dilated left atrium. No pericardial effusion. The left ventricle is normal in size and systolic function with a calculated ejection fraction of 65%.  - C/w BB, ok with switching succinate to tartrate.   - Had 3 beats of vtach on monitor - cards re-consulted      #afib  - holding AC  - C/w BB, ok with switching succinate to tartrate as per cards.       #HLD  - high dose statin as above in CVA  - LDL results: 54    Pulm  - call provider if SPO2 < 94%    GI  #Nutrition/Fluids/Electrolytes   - replete K<4 and Mg <2  - Diet: regular  - cont home PPI    Renal  - daily BMP    Infectious Disease  COVID + 7/8, started on paxlovid OSH  - afebrile  - COVID+, isolation precautions  - CXR: Heart size within normal limits, thoracic aortic calcification. Lungs and mediastinum are unremarkable. Thoracic spine degenerative changes, dextroscoliosis. Elevation of the right hemidiaphragm.    Endocrine  - A1C results: 5.6  - TSH results: 0.975    Urology  #urinary retention  Voiding, but continues to retain requiring straight cath x3  - initially declines saleh, but eventually agreed if another straight cath needed - saleh in place  - c/w tamsulosin 0.4mg     DVT Prophylaxis  - SCDs   - LE dopplers negative    Dispo: acute rehab. Since his initial COVID test was an at home rapid, 10 day isolation period required for transfer to acute rehab began on 7/12 from positive in-house PCR test. Will need to remain in isolation until at least 7/22.     PT/OT: AR     Discussed daily hospital plans and goals with patient and family at bedside.    Discussed with Neurology Attending Dr. Mondragon     76y Male with PMHx of Afib (on Xarelto), CVD (mid LAD, mid RCA, OM1, OM2, OM3 stents [id number]), HTN, HLD, GERD, and COVID infection 7/8/22 (tx w/ Paxlovid) presents to ED by EMS, found to be a thrombectomy case with L gaze, aphasia, R sided plegia. CTH showed hyperdense L MCA sign, confirmed on CTA. CTP showed significant mismatch. Patient was not a tpa candidate as he had likely taken Xeralto within 3 hours prior to arrival. S/p thrombectomy TICI 0 to 3. Of note, L ICA dissection (non-occlusive) was noted, likely iatrogenic. Stroke etiology more likely due to being off xarelto when patient was on paxlovid. CTH with SAH vs. contrast extravasation on L sylvian fissure. MRI confirmed SAH and multifocal areas of ischemia along left MCA territory.    Neuro  #CVA workup  - holding home xarelto for now, likely to switch to eliquis when cleared to start   - repeat CTH today, if stable, can start lovenox for DVT prophylaxis  - started on keppra 250mg BID for sz ppx  - continue atorvastatin 80mg daily   - q4hr stroke neuro checks and vitals  - Stroke Code HCT Results: Hyperdense left MCA which is suspicious for thrombus and acute left MCA territory infarct.  - Stroke Code CTA Results: Occlusion of the midportion of the left M1 segment to the proximal M2 segment and high-grade narrowing involving the origin of the left anterior temporal artery. Focal area of mild to moderate narrowing involving the origin of the right vertebral artery. No carotid stenosis.  - repeat CTH with SAH vs. contrast extravasation on L sylvian fissure.  - CTH 7/15 with SAH present, continue to hold AC/antithrombotics.   - MRI confirms left SAH and multifocal areas of ischemia along left MCA territory  - PT/OT/SLP  - Stroke education    Cards  #HTN  - BP goal 120-160  - c/w losartan 100mg daily (home dose)  #bradycardia  Bradycardic down to the 30s 7/15. Asymptomatic  - ekg 7/15 with NSR with APC  - EKG 7/16 NSR  # Cardiology was consulted on 7/17 for AIVR of 11 seconds seen on telemetry. Patient remained asx.  - EKG 7/17: NSR, diffused t wave inversions present on admission ekg as well  - Tele: AIVR at 100-120 bpm run of 11 seconds, PVCs, NSR, occasionally sinus bradycardia  - echo:  Limited study obtained for evaluation of left ventricular function. Normal left and right ventricular size and systolic function. Mildly dilated left atrium. No pericardial effusion. The left ventricle is normal in size and systolic function with a calculated ejection fraction of 65%.  - C/w BB, ok with switching succinate to tartrate.   - Had 3 beats of vtach on monitor - cards re-consulted on 7/18, f/u recs    - hospitalist Dr. Hernandez spoke to patient's PCP - TWI present as outpatient, no other ischemic workup after 2019, overall patent stents      #afib  - holding AC  - C/w BB, ok with switching succinate to tartrate as per cards.       #HLD  - high dose statin as above in CVA  - LDL results: 54    Pulm  - call provider if SPO2 < 94%    GI  #Nutrition/Fluids/Electrolytes   - replete K<4 and Mg <2  - Diet: regular  - cont home PPI    Renal  - daily BMP    Infectious Disease  COVID + 7/8, started on paxlovid OSH  - afebrile  - COVID+, isolation precautions  - CXR: Heart size within normal limits, thoracic aortic calcification. Lungs and mediastinum are unremarkable. Thoracic spine degenerative changes, dextroscoliosis. Elevation of the right hemidiaphragm.    Endocrine  - A1C results: 5.6  - TSH results: 0.975    Urology  #urinary retention  Voiding, but continues to retain requiring straight cath x3  - initially declines saleh, but eventually agreed if another straight cath needed - saleh in place  - c/w tamsulosin 0.4mg     DVT Prophylaxis  - SCDs   - LE dopplers negative    Dispo: acute rehab. Since his initial COVID test was an at home rapid, 10 day isolation period required for transfer to acute rehab began on 7/12 from positive in-house PCR test. Will need to remain in isolation until at least 7/22.     PT/OT: AR     Discussed daily hospital plans and goals with patient and family at bedside.    Discussed with Neurology Attending Dr. Mondragon     76y Male with PMHx of Afib (on Xarelto), CVD (mid LAD, mid RCA, OM1, OM2, OM3 stents 8519-9085), HTN, HLD, GERD, and COVID infection 7/8/22 (tx w/ Paxlovid) presents to ED by EMS, found to be a thrombectomy case with L gaze, aphasia, R sided plegia. CTH showed hyperdense L MCA sign, confirmed on CTA. CTP showed significant mismatch. Patient was not a tpa candidate as he had likely taken Xeralto within 3 hours prior to arrival. S/p thrombectomy TICI 0 to 3. Of note, L ICA dissection (non-occlusive) was noted, likely iatrogenic. Stroke etiology more likely due to being off xarelto when patient was on paxlovid. CTH with SAH vs. contrast extravasation on L sylvian fissure. MRI confirmed SAH and multifocal areas of ischemia along left MCA territory.    Neuro  #CVA workup  - holding home xarelto for now, likely to switch to eliquis when cleared to start   - Repeat CTH stable. Started heparin gtt (goal 40-60)  - started on keppra 250mg BID for sz ppx  - continue atorvastatin 80mg daily   - q4hr stroke neuro checks and vitals  - Stroke Code HCT Results: Hyperdense left MCA which is suspicious for thrombus and acute left MCA territory infarct.  - Stroke Code CTA Results: Occlusion of the midportion of the left M1 segment to the proximal M2 segment and high-grade narrowing involving the origin of the left anterior temporal artery. Focal area of mild to moderate narrowing involving the origin of the right vertebral artery. No carotid stenosis.  - repeat CTH with SAH vs. contrast extravasation on L sylvian fissure.  - CTH 7/15 with SAH present, continue to hold AC/antithrombotics.   - MRI confirms left SAH and multifocal areas of ischemia along left MCA territory  - PT/OT/SLP  - Stroke education    Cards  #HTN  - BP goal 120-160  - c/w losartan 100mg daily (home dose)  #bradycardia  Bradycardic down to the 30s 7/15. Asymptomatic  - ekg 7/15 with NSR with APC  - EKG 7/16 NSR  # Cardiology was consulted on 7/17 for AIVR of 11 seconds seen on telemetry. Patient remained asx.  - EKG 7/17: NSR, diffused t wave inversions present on admission ekg as well  - Tele: AIVR at 100-120 bpm run of 11 seconds, PVCs, NSR, occasionally sinus bradycardia  - echo:  Limited study obtained for evaluation of left ventricular function. Normal left and right ventricular size and systolic function. Mildly dilated left atrium. No pericardial effusion. The left ventricle is normal in size and systolic function with a calculated ejection fraction of 65%.  - C/w BB, ok with switching succinate to tartrate.   - Had 3 beats of vtach on monitor - cards re-consulted on 7/18, f/u recs    - hospitalist Dr. Hernandez spoke to patient's PCP - TWI present as outpatient, no other ischemic workup after 2019, overall patent stents      #afib  - holding AC  - C/w BB, ok with switching succinate to tartrate as per cards.       #HLD  - high dose statin as above in CVA  - LDL results: 54    Pulm  - call provider if SPO2 < 94%    GI  #Nutrition/Fluids/Electrolytes   - replete K<4 and Mg <2  - Diet: regular  - cont home PPI    Renal  - daily BMP    Infectious Disease  COVID + 7/8, started on paxlovid OSH  - afebrile  - COVID+, isolation precautions  - CXR: Heart size within normal limits, thoracic aortic calcification. Lungs and mediastinum are unremarkable. Thoracic spine degenerative changes, dextroscoliosis. Elevation of the right hemidiaphragm.    Endocrine  - A1C results: 5.6  - TSH results: 0.975    Urology  #urinary retention  Voiding, but continues to retain requiring straight cath x3  - initially declines saleh, but eventually agreed if another straight cath needed - saleh in place  - c/w tamsulosin 0.4mg     DVT Prophylaxis  - SCDs   - LE dopplers negative    Dispo: acute rehab. Since his initial COVID test was an at home rapid, 10 day isolation period required for transfer to acute rehab began on 7/12 from positive in-house PCR test. Will need to remain in isolation until at least 7/22.     PT/OT: AR     Discussed daily hospital plans and goals with patient and family at bedside.    Discussed with Neurology Attending Dr. Mondragon     76y Male with PMHx of Afib (on Xarelto), CVD (mid LAD, mid RCA, OM1, OM2, OM3 stents 6499-4140), HTN, HLD, GERD, and COVID infection 7/8/22 (tx w/ Paxlovid) presents to ED by EMS, found to be a thrombectomy case with L gaze, aphasia, R sided plegia. CTH showed hyperdense L MCA sign, confirmed on CTA. CTP showed significant mismatch. Patient was not a tpa candidate as he had likely taken Xeralto within 3 hours prior to arrival. S/p thrombectomy TICI 0 to 3. Of note, L ICA dissection (non-occlusive) was noted, likely iatrogenic. Stroke etiology more likely due to being off xarelto when patient was on paxlovid. CTH with SAH vs. contrast extravasation on L sylvian fissure. MRI confirmed SAH and multifocal areas of ischemia along left MCA territory.    Neuro  #CVA workup  - holding home xarelto for now, likely to switch to eliquis when cleared to start   - Repeat CTH stable. Started heparin gtt (goal 40-60). f/u scan at 11pm tonight  - started on keppra 250mg BID for sz ppx  - continue atorvastatin 80mg daily   - q4hr stroke neuro checks and vitals  - Stroke Code HCT Results: Hyperdense left MCA which is suspicious for thrombus and acute left MCA territory infarct.  - Stroke Code CTA Results: Occlusion of the midportion of the left M1 segment to the proximal M2 segment and high-grade narrowing involving the origin of the left anterior temporal artery. Focal area of mild to moderate narrowing involving the origin of the right vertebral artery. No carotid stenosis.  - repeat CTH with SAH vs. contrast extravasation on L sylvian fissure.  - CTH 7/15 with SAH present, continue to hold AC/antithrombotics.   - MRI confirms left SAH and multifocal areas of ischemia along left MCA territory  - PT/OT/SLP  - Stroke education    Cards  #HTN  - BP goal 120-160  - c/w losartan 100mg daily (home dose)  #bradycardia  Bradycardic down to the 30s 7/15. Asymptomatic  - ekg 7/15 with NSR with APC  - EKG 7/16 NSR  # Cardiology was consulted on 7/17 for AIVR of 11 seconds seen on telemetry. Patient remained asx.  - EKG 7/17: NSR, diffused t wave inversions present on admission ekg as well  - Tele: AIVR at 100-120 bpm run of 11 seconds, PVCs, NSR, occasionally sinus bradycardia  - echo:  Limited study obtained for evaluation of left ventricular function. Normal left and right ventricular size and systolic function. Mildly dilated left atrium. No pericardial effusion. The left ventricle is normal in size and systolic function with a calculated ejection fraction of 65%.  - C/w BB, ok with switching succinate to tartrate.   - Had 3 beats of vtach on monitor - cards re-consulted on 7/18, f/u recs    - hospitalist Dr. Hernandez spoke to patient's PCP - TWI present as outpatient, no other ischemic workup after 2019, overall patent stents      #afib  - holding AC  - C/w BB, ok with switching succinate to tartrate as per cards.       #HLD  - high dose statin as above in CVA  - LDL results: 54    Pulm  - call provider if SPO2 < 94%    GI  #Nutrition/Fluids/Electrolytes   - replete K<4 and Mg <2  - Diet: regular  - cont home PPI    Renal  - daily BMP    Infectious Disease  COVID + 7/8, started on paxlovid OSH  - afebrile  - COVID+, isolation precautions  - CXR: Heart size within normal limits, thoracic aortic calcification. Lungs and mediastinum are unremarkable. Thoracic spine degenerative changes, dextroscoliosis. Elevation of the right hemidiaphragm.    Endocrine  - A1C results: 5.6  - TSH results: 0.975    Urology  #urinary retention  Voiding, but continues to retain requiring straight cath x3  - initially declines saleh, but eventually agreed if another straight cath needed - saleh in place  - c/w tamsulosin 0.4mg     DVT Prophylaxis  - SCDs   - LE dopplers negative    Dispo: acute rehab. Since his initial COVID test was an at home rapid, 10 day isolation period required for transfer to acute rehab began on 7/12 from positive in-house PCR test. Will need to remain in isolation until at least 7/22.     PT/OT: AR     Discussed daily hospital plans and goals with patient and family at bedside.    Discussed with Neurology Attending Dr. Mondragon     76y Male with PMHx of Afib (on Xarelto), CVD (mid LAD, mid RCA, OM1, OM2, OM3 stents [phone]), HTN, HLD, GERD, and COVID infection 7/8/22 (tx w/ Paxlovid) presents to ED by EMS, found to be a thrombectomy case with L gaze, aphasia, R sided plegia. CTH showed hyperdense L MCA sign, confirmed on CTA. CTP showed significant mismatch. Patient was not a tpa candidate as he had likely taken Xeralto within 3 hours prior to arrival. S/p thrombectomy TICI 0 to 3. Of note, L ICA dissection (non-occlusive) was noted, likely iatrogenic. Stroke etiology more likely due to being off xarelto when patient was on paxlovid. CTH with SAH vs. contrast extravasation on L sylvian fissure. MRI confirmed SAH and multifocal areas of ischemia along left MCA territory.    Neuro  #CVA workup  - holding home xarelto for now, likely to switch to eliquis when cleared to start   - Repeat CTH stable. Started heparin gtt (goal 40-60) as patient has underlying afib and repeat scan stable. f/u scan at 11pm tonight  - started on keppra 250mg BID for sz ppx  - continue atorvastatin 80mg daily   - q4hr stroke neuro checks and vitals  - Stroke Code HCT Results: Hyperdense left MCA which is suspicious for thrombus and acute left MCA territory infarct.  - Stroke Code CTA Results: Occlusion of the midportion of the left M1 segment to the proximal M2 segment and high-grade narrowing involving the origin of the left anterior temporal artery. Focal area of mild to moderate narrowing involving the origin of the right vertebral artery. No carotid stenosis.  - repeat CTH with SAH vs. contrast extravasation on L sylvian fissure.  - CTH 7/15 with SAH present, continue to hold AC/antithrombotics.   - MRI confirms left SAH and multifocal areas of ischemia along left MCA territory  - PT/OT/SLP  - Stroke education    Cards  #HTN  - BP goal 120-160  - c/w losartan 100mg daily (home dose)  #bradycardia  Bradycardic down to the 30s 7/15. Asymptomatic  - ekg 7/15 with NSR with APC  - EKG 7/16 NSR  # Cardiology was consulted on 7/17 for AIVR of 11 seconds seen on telemetry. Patient remained asx.  - EKG 7/17: NSR, diffused t wave inversions present on admission ekg as well  - Tele: AIVR at 100-120 bpm run of 11 seconds, PVCs, NSR, occasionally sinus bradycardia  - echo:  Limited study obtained for evaluation of left ventricular function. Normal left and right ventricular size and systolic function. Mildly dilated left atrium. No pericardial effusion. The left ventricle is normal in size and systolic function with a calculated ejection fraction of 65%.  - C/w BB, ok with switching succinate to tartrate.   - Had 3 beats of vtach on monitor in the morning then Also went into 28beats of vtach while working with PT in the evening, cards said likely AIVR which is benign and normal in reperfusion state    - hospitalist Dr. Hernandez spoke to patient's PCP - TWI present as outpatient, no other ischemic workup after 2019, overall patent stents      #afib  - holding AC  - C/w BB, ok with switching succinate to tartrate as per cards.       #HLD  - high dose statin as above in CVA  - LDL results: 54    Pulm  - call provider if SPO2 < 94%    GI  #Nutrition/Fluids/Electrolytes   - replete K<4 and Mg <2  - Diet: regular  - cont home PPI    Renal  - daily BMP    Infectious Disease  COVID + 7/8, started on paxlovid OSH  - afebrile  - COVID+, isolation precautions  - CXR: Heart size within normal limits, thoracic aortic calcification. Lungs and mediastinum are unremarkable. Thoracic spine degenerative changes, dextroscoliosis. Elevation of the right hemidiaphragm.    Endocrine  - A1C results: 5.6  - TSH results: 0.975    Urology  #urinary retention  Voiding, but continues to retain requiring straight cath x3  - initially declines saleh, but eventually agreed if another straight cath needed - saleh in place  - c/w tamsulosin 0.4mg     DVT Prophylaxis  - SCDs   - LE dopplers negative    Dispo: acute rehab. Since his initial COVID test was an at home rapid, 10 day isolation period required for transfer to acute rehab began on 7/12 from positive in-house PCR test. Will need to remain in isolation until at least 7/22.     PT/OT: AR     Discussed daily hospital plans and goals with patient and family at bedside.    Discussed with Neurology Attending Dr. Mondragon     76y Male with PMHx of Afib (on Xarelto), CVD (mid LAD, mid RCA, OM1, OM2, OM3 stents [id number]), HTN, HLD, GERD, and COVID infection 7/8/22 (tx w/ Paxlovid) presents to ED by EMS, found to be a thrombectomy case with L gaze, aphasia, R sided plegia. CTH showed hyperdense L MCA sign, confirmed on CTA. CTP showed significant mismatch. Patient was not a tpa candidate as he had likely taken Xeralto within 3 hours prior to arrival. S/p thrombectomy TICI 0 to 3. Of note, L ICA dissection (non-occlusive) was noted, likely iatrogenic. Stroke etiology more likely due to being off xarelto when patient was on paxlovid. CTH with SAH vs. contrast extravasation on L sylvian fissure. MRI confirmed SAH and multifocal areas of ischemia along left MCA territory.    Neuro  #CVA workup  - holding home xarelto for now, likely to switch to eliquis when cleared to start   - Repeat CTH stable. Started heparin gtt (goal 40-60) as patient has underlying afib and repeat scan stable. f/u scan at 11pm tonight  - started on keppra 250mg BID for sz ppx  - continue atorvastatin 80mg daily   - q4hr stroke neuro checks and vitals  - Stroke Code HCT Results: Hyperdense left MCA which is suspicious for thrombus and acute left MCA territory infarct.  - Stroke Code CTA Results: Occlusion of the midportion of the left M1 segment to the proximal M2 segment and high-grade narrowing involving the origin of the left anterior temporal artery. Focal area of mild to moderate narrowing involving the origin of the right vertebral artery. No carotid stenosis.  - repeat CTH with SAH vs. contrast extravasation on L sylvian fissure.  - CTH 7/15 with SAH present, continue to hold AC/antithrombotics.   - MRI confirms left SAH and multifocal areas of ischemia along left MCA territory  - PT/OT/SLP  - Stroke education    Cards  #HTN  - BP goal 120-160  - c/w losartan 100mg daily (home dose)  #bradycardia  Bradycardic down to the 30s 7/15. Asymptomatic  - ekg 7/15 with NSR with APC  - EKG 7/16 NSR  # Cardiology was consulted on 7/17 for AIVR of 11 seconds seen on telemetry. Patient remained asx.  - EKG 7/17: NSR, diffused t wave inversions present on admission ekg as well  - Tele: AIVR at 100-120 bpm run of 11 seconds, PVCs, NSR, occasionally sinus bradycardia  - echo:  Limited study obtained for evaluation of left ventricular function. Normal left and right ventricular size and systolic function. Mildly dilated left atrium. No pericardial effusion. The left ventricle is normal in size and systolic function with a calculated ejection fraction of 65%.  - C/w BB, ok with switching succinate to tartrate.   - Had 3 beats of vtach on monitor in the morning then also went into 28beats of vtach while working with PT in the evening, cards said confirmed sustained vtach. Increased lopressor to 25mg BID. Electrolytes ordered for 6pm. EP consulted.    - hospitalist Dr. Hernandez spoke to patient's PCP - TWI present as outpatient, no other ischemic workup after 2019, overall patent stents      #afib  - holding AC  - C/w BB, ok with switching succinate to tartrate as per cards.       #HLD  - high dose statin as above in CVA  - LDL results: 54    Pulm  - call provider if SPO2 < 94%    GI  #Nutrition/Fluids/Electrolytes   - replete K<4 and Mg <2  - Diet: regular  - cont home PPI    Renal  - daily BMP    Infectious Disease  COVID + 7/8, started on paxlovid OSH  - afebrile  - COVID+, isolation precautions  - CXR: Heart size within normal limits, thoracic aortic calcification. Lungs and mediastinum are unremarkable. Thoracic spine degenerative changes, dextroscoliosis. Elevation of the right hemidiaphragm.    Endocrine  - A1C results: 5.6  - TSH results: 0.975    Urology  #urinary retention  Voiding, but continues to retain requiring straight cath x3  - initially declines saleh, but eventually agreed if another straight cath needed - saleh in place  - c/w tamsulosin 0.4mg     DVT Prophylaxis  - SCDs   - LE dopplers negative    Dispo: acute rehab. Since his initial COVID test was an at home rapid, 10 day isolation period required for transfer to acute rehab began on 7/12 from positive in-house PCR test. Will need to remain in isolation until at least 7/22.     PT/OT: AR     Discussed daily hospital plans and goals with patient and family at bedside.    Discussed with Neurology Attending Dr. Mondragon     76y Male with PMHx of Afib (on Xarelto), CVD (mid LAD, mid RCA, OM1, OM2, OM3 stents [id number]), HTN, HLD, GERD, and COVID infection 7/8/22 (tx w/ Paxlovid) presents to ED by EMS, found to be a thrombectomy case with L gaze, aphasia, R sided plegia. CTH showed hyperdense L MCA sign, confirmed on CTA. CTP showed significant mismatch. Patient was not a tpa candidate as he had likely taken Xeralto within 3 hours prior to arrival. S/p thrombectomy TICI 0 to 3. Of note, L ICA dissection (non-occlusive) was noted, likely iatrogenic. Stroke etiology more likely due to being off xarelto when patient was on paxlovid. CTH with SAH vs. contrast extravasation on L sylvian fissure. MRI confirmed SAH and multifocal areas of ischemia along left MCA territory.    Neuro  #CVA workup  - holding home xarelto for now, likely to switch to eliquis when cleared to start   - Repeat CTH stable. Started heparin gtt (goal 40-60) as patient has underlying afib and repeat scan stable. f/u scan at 11pm tonight  - started on keppra 250mg BID for sz ppx  - continue atorvastatin 80mg daily   - q4hr stroke neuro checks and vitals  - Stroke Code HCT Results: Hyperdense left MCA which is suspicious for thrombus and acute left MCA territory infarct.  - Stroke Code CTA Results: Occlusion of the midportion of the left M1 segment to the proximal M2 segment and high-grade narrowing involving the origin of the left anterior temporal artery. Focal area of mild to moderate narrowing involving the origin of the right vertebral artery. No carotid stenosis.  - repeat CTH with SAH vs. contrast extravasation on L sylvian fissure.  - CTH 7/15 with SAH present, continue to hold AC/antithrombotics.   - MRI confirms left SAH and multifocal areas of ischemia along left MCA territory  - PT/OT/SLP  - Stroke education    Cards  #HTN  - BP goal 120-160  - c/w losartan 100mg daily (home dose)  #bradycardia  Bradycardic down to the 30s 7/15. Asymptomatic  - ekg 7/15 with NSR with APC  - EKG 7/16 NSR  # Cardiology was consulted on 7/17 for AIVR of 11 seconds seen on telemetry. Patient remained asx.  - EKG 7/17: NSR, diffused t wave inversions present on admission ekg as well  - Tele: AIVR at 100-120 bpm run of 11 seconds, PVCs, NSR, occasionally sinus bradycardia  - echo:  Limited study obtained for evaluation of left ventricular function. Normal left and right ventricular size and systolic function. Mildly dilated left atrium. No pericardial effusion. The left ventricle is normal in size and systolic function with a calculated ejection fraction of 65%.  - C/w BB, ok with switching succinate to tartrate.   - Had 3 beats of vtach on monitor in the morning then also went into 28beats of vtach while working with PT in the evening, cards said confirmed sustained vtach. Increased lopressor to 25mg BID. Electrolytes ordered for 6pm. EP consult in AM.    - hospitalist Dr. Hernandez spoke to patient's PCP - TWI present as outpatient, no other ischemic workup after 2019, overall patent stents      #afib  - holding AC  - C/w BB, ok with switching succinate to tartrate as per cards.       #HLD  - high dose statin as above in CVA  - LDL results: 54    Pulm  - call provider if SPO2 < 94%    GI  #Nutrition/Fluids/Electrolytes   - replete K<4 and Mg <2  - Diet: regular  - cont home PPI    Renal  - daily BMP    Infectious Disease  COVID + 7/8, started on paxlovid OSH  - afebrile  - COVID+, isolation precautions  - CXR: Heart size within normal limits, thoracic aortic calcification. Lungs and mediastinum are unremarkable. Thoracic spine degenerative changes, dextroscoliosis. Elevation of the right hemidiaphragm.    Endocrine  - A1C results: 5.6  - TSH results: 0.975    Urology  #urinary retention  Voiding, but continues to retain requiring straight cath x3  - initially declines saleh, but eventually agreed if another straight cath needed - saleh in place  - c/w tamsulosin 0.4mg     DVT Prophylaxis  - SCDs   - LE dopplers negative    Dispo: acute rehab. Since his initial COVID test was an at home rapid, 10 day isolation period required for transfer to acute rehab began on 7/12 from positive in-house PCR test. Will need to remain in isolation until at least 7/22.     PT/OT: AR     Discussed daily hospital plans and goals with patient and family at bedside.    Discussed with Neurology Attending Dr. Mondragon

## 2022-07-18 NOTE — PROGRESS NOTE ADULT - ASSESSMENT
76M with Afib (on Xarelto), CVD (mid LAD, mid RCA, OM1, OM2, OM3 stents [id number]), HTN, HLD, GERD, and COVID infection 7/8/22 (tx w/ Paxlovid) presents to ED by EMS, found to be a thrombectomy case with L gaze, aphasia, R sided plegia. CTH showed hyperdense L MCA sign, confirmed on CTA. CTP showed significant mismatch. Patient was not a tpa candidate as he had likely taken Xeralto within 3 hours prior to arrival. S/p thrombectomy. MRI confirmed SAH and acute left MCA ischemia. Noted to have episodes of accelerated ventricular rhythm with episodes of sinus bradycardia on tele. However, when asked he reports being asymptomatic. EKG shows NSR with T-wave inversions, unchanged from prior. Cardiology consulted.    #Acute left MCA CVA with SAH  - continue holding home xarelto for now   - continue atorvastatin 80mg daily   - CTH: Hyperdense left MCA which is suspicious for thrombus and acute left MCA territory infarct.  - CTA: Occlusion of the midportion of the left M1 segment to the proximal M2 segment and high-grade narrowing involving the origin of the left anterior temporal artery. Focal area of mild to moderate narrowing involving the origin of the right vertebral artery. No carotid stenosis.  - Repeat CTH with SAH vs. contrast extravasation on L sylvian fissure.  - CTH 7/15 with SAH present, continue to hold AC/antithrombotics.   - MRI confirms SAH + acute left MCA ischemia  - Physical therapy/Occupational therapy, acute rehab  - SLP    #Hypertension  - Remains with elevated BP readings  - Metoprolol succinate 12.5mg BID (home dose)  - Increase losartan to 100mg daily   - TTE: Limited study, no PFO EF 65%    #Bradycardia  - Bradycardic down to the 30s 7/15 again noted 7/17. Asymptomatic  - EKG 7/17 with NSR with T-wave inversions, unchanged compared to prior EKGs  - Cardiology consulted, recommendations appreciated    #Atrial fibrillation  - holding AC  - Continue home dose metoprolol  - Cardiology consulted, recommendations appreciated    #Hyperlipidemia   - continue atorvastatin 80mg daily     #COVID-19  - COVID + 7/8, started on paxlovid OSH  - Afebrile  - Isolation precautions  - CXR: Heart size within normal limits, thoracic aortic calcification. Lungs and mediastinum are unremarkable. Thoracic spine degenerative changes, dextroscoliosis. Elevation of the right hemidiaphragm.    #Urinary retention  - Voiding, but continues to retain requiring straight cath x3. Rodriguez now in place  - start tamsulosin 0.4mg qhs       76M with Afib (on Xarelto), CVD (mid LAD, mid RCA, OM1, OM2, OM3 stents [id number]), HTN, HLD, GERD, and COVID infection 7/8/22 (tx w/ Paxlovid) presents to ED by EMS, found to be a thrombectomy case with L gaze, aphasia, R sided plegia. CTH showed hyperdense L MCA sign, confirmed on CTA. CTP showed significant mismatch. Patient was not a tpa candidate as he had likely taken Xeralto within 3 hours prior to arrival. S/p thrombectomy. MRI confirmed SAH and acute left MCA ischemia. Noted to have episodes of accelerated ventricular rhythm with episodes of sinus bradycardia on tele. However, when asked he reports being asymptomatic. EKG shows NSR with T-wave inversions, unchanged from prior. Cardiology consulted.    #Acute left MCA CVA with SAH, with continued evolution  - reviewed repeat CTH with interval evolution  - continue holding home xarelto for now   - continue atorvastatin 80mg daily   - CTH: Hyperdense left MCA which is suspicious for thrombus and acute left MCA territory infarct.  - CTA: Occlusion of the midportion of the left M1 segment to the proximal M2 segment and high-grade narrowing involving the origin of the left anterior temporal artery. Focal area of mild to moderate narrowing involving the origin of the right vertebral artery. No carotid stenosis.  - Repeat CTH with SAH vs. contrast extravasation on L sylvian fissure.  - CTH 7/15 with SAH present, continue to hold AC/antithrombotics.   - MRI confirms SAH + acute left MCA ischemia  - Physical therapy/Occupational therapy, acute rehab  - SLP    #Hypertension  - Remains with elevated BP readings  - Metoprolol succinate 12.5mg BID (home dose)  - Increase losartan to 100mg daily   - TTE: Limited study, no PFO EF 65%    #Bradycardia  - EKG 7/17 with NSR with T-wave inversions, unchanged compared to prior EKGs  - Cardiology consulted, recommendations appreciated    #Atrial fibrillation  - holding AC  - Continue home dose metoprolol  - Cardiology consulted, recommendations appreciated    #Hyperlipidemia   - continue atorvastatin 80mg daily     #COVID-19  - COVID + 7/8, started on paxlovid OSH  - Afebrile  - Isolation precautions  - CXR: Heart size within normal limits, thoracic aortic calcification. Lungs and mediastinum are unremarkable. Thoracic spine degenerative changes, dextroscoliosis. Elevation of the right hemidiaphragm.    #Urinary retention  - Voiding, but continues to retain requiring straight cath x3. Rodriguez now in place  - start tamsulosin 0.4mg qhs    discussed above with patient's PCP   discussed with neurology

## 2022-07-18 NOTE — PROGRESS NOTE ADULT - SUBJECTIVE AND OBJECTIVE BOX
Neurology Stroke Progress Note    INTERVAL HPI/OVERNIGHT EVENTS:  Early this AM received 10mg IV hydral and given his 100mg losartan early for SBP 190s with good effect.   Patient seen and examined. Expressive aphasia a bit worse than yesterday, pt frustrated. Started on Keppra 250mg BID for sz ppx. Plan for repeat CTH this morning.    MEDICATIONS  (STANDING):  atorvastatin 80 milliGRAM(s) Oral at bedtime  chlorhexidine 2% Cloths 1 Application(s) Topical <User Schedule>  famotidine    Tablet 40 milliGRAM(s) Oral daily  levETIRAcetam 250 milliGRAM(s) Oral every 12 hours  losartan 100 milliGRAM(s) Oral daily  melatonin 5 milliGRAM(s) Oral at bedtime  metoprolol tartrate 12.5 milliGRAM(s) Oral two times a day  pantoprazole    Tablet 40 milliGRAM(s) Oral before breakfast  tamsulosin 0.4 milliGRAM(s) Oral at bedtime    MEDICATIONS  (PRN):  acetaminophen     Tablet .. 650 milliGRAM(s) Oral every 6 hours PRN Temp greater or equal to 38C (100.4F), Mild Pain (1 - 3)  ondansetron Injectable 4 milliGRAM(s) IV Push every 6 hours PRN Nausea and/or Vomiting  senna 2 Tablet(s) Oral at bedtime PRN Constipation      Allergies    penicillin (Unknown)    Intolerances        Vital Signs Last 24 Hrs  T(C): 36.4 (18 Jul 2022 09:01), Max: 36.6 (18 Jul 2022 02:13)  T(F): 97.6 (18 Jul 2022 09:01), Max: 97.8 (18 Jul 2022 02:13)  HR: 70 (18 Jul 2022 08:30) (70 - 96)  BP: 130/66 (18 Jul 2022 08:30) (125/71 - 193/91)  BP(mean): 91 (18 Jul 2022 08:30) (88 - 135)  RR: 16 (18 Jul 2022 08:30) (16 - 17)  SpO2: 97% (18 Jul 2022 08:30) (95% - 99%)    Parameters below as of 18 Jul 2022 08:30  Patient On (Oxygen Delivery Method): room air        Physical exam:  Neurologic:  -Mental status: Awake, alert, oriented to person, place, and time. Speech with paraphrasic errors, slow (improved fluency when distracted and not focused on language) and some word finding. Able to complete cross commands. No dysarthria.   -Cranial nerves:   II: Visual fields are full to confrontation.  III, IV, VI: Extraocular movements are intact without nystagmus. Pupils round and reactive to light, L ~3mm, R~4mm  V:  Facial sensation V1-V3 equal and intact   VII: Face is symmetric with normal eye closure and smile  VIII: Hearing is grossly intact bilaterally.   Motor: Normal bulk and tone. No pronator drift. Mild finger curl with subtle pronation in the RUE 4+/5, LUE 5/5. Bilateral lower extremities 5/5.  Sensation: Intact to light touch bilaterally. No neglect or extinction on double simultaneous testing.  Coordination: Mild dysmetria right arm on FTN.       LABS:                        14.0   8.19  )-----------( 376      ( 18 Jul 2022 05:30 )             41.2     07-18    138  |  104  |  15  ----------------------------<  107<H>  4.0   |  25  |  0.94    Ca    9.2      18 Jul 2022 05:30  Phos  3.0     07-17  Mg     2.0     07-18            RADIOLOGY & ADDITIONAL TESTS:       Neurology Stroke Progress Note    INTERVAL HPI/OVERNIGHT EVENTS:  Early this AM received 10mg IV hydral and given his 100mg losartan early for SBP 190s with good effect.   Patient seen and examined. Expressive aphasia a bit worse than yesterday, pt frustrated. Started on Keppra 250mg BID for sz ppx. Plan for repeat CTH this morning.    MEDICATIONS  (STANDING):  atorvastatin 80 milliGRAM(s) Oral at bedtime  chlorhexidine 2% Cloths 1 Application(s) Topical <User Schedule>  famotidine    Tablet 40 milliGRAM(s) Oral daily  levETIRAcetam 250 milliGRAM(s) Oral every 12 hours  losartan 100 milliGRAM(s) Oral daily  melatonin 5 milliGRAM(s) Oral at bedtime  metoprolol tartrate 12.5 milliGRAM(s) Oral two times a day  pantoprazole    Tablet 40 milliGRAM(s) Oral before breakfast  tamsulosin 0.4 milliGRAM(s) Oral at bedtime    MEDICATIONS  (PRN):  acetaminophen     Tablet .. 650 milliGRAM(s) Oral every 6 hours PRN Temp greater or equal to 38C (100.4F), Mild Pain (1 - 3)  ondansetron Injectable 4 milliGRAM(s) IV Push every 6 hours PRN Nausea and/or Vomiting  senna 2 Tablet(s) Oral at bedtime PRN Constipation      Allergies    penicillin (Unknown)    Intolerances        Vital Signs Last 24 Hrs  T(C): 36.4 (18 Jul 2022 09:01), Max: 36.6 (18 Jul 2022 02:13)  T(F): 97.6 (18 Jul 2022 09:01), Max: 97.8 (18 Jul 2022 02:13)  HR: 70 (18 Jul 2022 08:30) (70 - 96)  BP: 130/66 (18 Jul 2022 08:30) (125/71 - 193/91)  BP(mean): 91 (18 Jul 2022 08:30) (88 - 135)  RR: 16 (18 Jul 2022 08:30) (16 - 17)  SpO2: 97% (18 Jul 2022 08:30) (95% - 99%)    Parameters below as of 18 Jul 2022 08:30  Patient On (Oxygen Delivery Method): room air        Physical exam:  Neurologic:  -Mental status: Awake, alert, oriented to person, place, and time. Speech with paraphrasic errors, decreased fluency compared to yesterday, and some word finding. Able to complete cross commands. No dysarthria.   -Cranial nerves:   II: Visual fields are full to confrontation.  III, IV, VI: Extraocular movements are intact without nystagmus. Pupils round and reactive to light, L ~3mm, R~4mm  V:  Facial sensation V1-V3 equal and intact   VII: Face is symmetric with normal eye closure and smile  VIII: Hearing is grossly intact bilaterally.   Motor: Normal bulk and tone. No pronator drift. Mild finger curl with subtle pronation in the RUE 4+/5, LUE 5/5. Bilateral lower extremities 5/5.  Sensation: Intact to light touch bilaterally. No neglect or extinction on double simultaneous testing.  Coordination: Mild dysmetria right arm on FTN.       LABS:                        14.0   8.19  )-----------( 376      ( 18 Jul 2022 05:30 )             41.2     07-18    138  |  104  |  15  ----------------------------<  107<H>  4.0   |  25  |  0.94    Ca    9.2      18 Jul 2022 05:30  Phos  3.0     07-17  Mg     2.0     07-18            RADIOLOGY & ADDITIONAL TESTS:       Neurology Stroke Progress Note    INTERVAL HPI/OVERNIGHT EVENTS:  Early this AM received 10mg IV hydral and given his 100mg losartan early for SBP 190s with good effect.   Patient seen and examined. Expressive aphasia a bit worse than yesterday, pt frustrated. Started on Keppra 250mg BID for sz ppx. Plan for repeat CTH this morning.  Had 3 beats of vtach on monitor - cards consulted    MEDICATIONS  (STANDING):  atorvastatin 80 milliGRAM(s) Oral at bedtime  chlorhexidine 2% Cloths 1 Application(s) Topical <User Schedule>  famotidine    Tablet 40 milliGRAM(s) Oral daily  levETIRAcetam 250 milliGRAM(s) Oral every 12 hours  losartan 100 milliGRAM(s) Oral daily  melatonin 5 milliGRAM(s) Oral at bedtime  metoprolol tartrate 12.5 milliGRAM(s) Oral two times a day  pantoprazole    Tablet 40 milliGRAM(s) Oral before breakfast  tamsulosin 0.4 milliGRAM(s) Oral at bedtime    MEDICATIONS  (PRN):  acetaminophen     Tablet .. 650 milliGRAM(s) Oral every 6 hours PRN Temp greater or equal to 38C (100.4F), Mild Pain (1 - 3)  ondansetron Injectable 4 milliGRAM(s) IV Push every 6 hours PRN Nausea and/or Vomiting  senna 2 Tablet(s) Oral at bedtime PRN Constipation      Allergies    penicillin (Unknown)    Intolerances        Vital Signs Last 24 Hrs  T(C): 36.4 (18 Jul 2022 09:01), Max: 36.6 (18 Jul 2022 02:13)  T(F): 97.6 (18 Jul 2022 09:01), Max: 97.8 (18 Jul 2022 02:13)  HR: 70 (18 Jul 2022 08:30) (70 - 96)  BP: 130/66 (18 Jul 2022 08:30) (125/71 - 193/91)  BP(mean): 91 (18 Jul 2022 08:30) (88 - 135)  RR: 16 (18 Jul 2022 08:30) (16 - 17)  SpO2: 97% (18 Jul 2022 08:30) (95% - 99%)    Parameters below as of 18 Jul 2022 08:30  Patient On (Oxygen Delivery Method): room air        Physical exam:  Neurologic:  -Mental status: Awake, alert, oriented to person, place, and time. Speech with paraphrasic errors, decreased fluency compared to yesterday, and some word finding. Able to complete cross commands. No dysarthria.   -Cranial nerves:   II: Visual fields are full to confrontation.  III, IV, VI: Extraocular movements are intact without nystagmus. Pupils round and reactive to light, L ~3mm, R~4mm  V:  Facial sensation V1-V3 equal and intact   VII: Face is symmetric with normal eye closure and smile  VIII: Hearing is grossly intact bilaterally.   Motor: Normal bulk and tone. No pronator drift. Mild finger curl with subtle pronation in the RUE 4+/5, LUE 5/5. Bilateral lower extremities 5/5.  Sensation: Intact to light touch bilaterally. No neglect or extinction on double simultaneous testing.  Coordination: Mild dysmetria right arm on FTN.       LABS:                        14.0   8.19  )-----------( 376      ( 18 Jul 2022 05:30 )             41.2     07-18    138  |  104  |  15  ----------------------------<  107<H>  4.0   |  25  |  0.94    Ca    9.2      18 Jul 2022 05:30  Phos  3.0     07-17  Mg     2.0     07-18            RADIOLOGY & ADDITIONAL TESTS:       Neurology Stroke Progress Note    INTERVAL HPI/OVERNIGHT EVENTS:  Early this AM received 10mg IV hydral and given his 100mg losartan early for SBP 190s with good effect.   Patient seen and examined. Expressive aphasia a bit worse than yesterday, pt frustrated. Started on Keppra 250mg BID for sz ppx. Plan for repeat CTH this morning.  Had 3 beats of vtach on monitor - cards re-consulted    MEDICATIONS  (STANDING):  atorvastatin 80 milliGRAM(s) Oral at bedtime  chlorhexidine 2% Cloths 1 Application(s) Topical <User Schedule>  famotidine    Tablet 40 milliGRAM(s) Oral daily  levETIRAcetam 250 milliGRAM(s) Oral every 12 hours  losartan 100 milliGRAM(s) Oral daily  melatonin 5 milliGRAM(s) Oral at bedtime  metoprolol tartrate 12.5 milliGRAM(s) Oral two times a day  pantoprazole    Tablet 40 milliGRAM(s) Oral before breakfast  tamsulosin 0.4 milliGRAM(s) Oral at bedtime    MEDICATIONS  (PRN):  acetaminophen     Tablet .. 650 milliGRAM(s) Oral every 6 hours PRN Temp greater or equal to 38C (100.4F), Mild Pain (1 - 3)  ondansetron Injectable 4 milliGRAM(s) IV Push every 6 hours PRN Nausea and/or Vomiting  senna 2 Tablet(s) Oral at bedtime PRN Constipation      Allergies    penicillin (Unknown)    Intolerances        Vital Signs Last 24 Hrs  T(C): 36.4 (18 Jul 2022 09:01), Max: 36.6 (18 Jul 2022 02:13)  T(F): 97.6 (18 Jul 2022 09:01), Max: 97.8 (18 Jul 2022 02:13)  HR: 70 (18 Jul 2022 08:30) (70 - 96)  BP: 130/66 (18 Jul 2022 08:30) (125/71 - 193/91)  BP(mean): 91 (18 Jul 2022 08:30) (88 - 135)  RR: 16 (18 Jul 2022 08:30) (16 - 17)  SpO2: 97% (18 Jul 2022 08:30) (95% - 99%)    Parameters below as of 18 Jul 2022 08:30  Patient On (Oxygen Delivery Method): room air        Physical exam:  Neurologic:  -Mental status: Awake, alert, oriented to person, place, and time. Speech with paraphrasic errors, decreased fluency compared to yesterday, and some word finding. Able to complete cross commands. No dysarthria.   -Cranial nerves:   II: Visual fields are full to confrontation.  III, IV, VI: Extraocular movements are intact without nystagmus. Pupils round and reactive to light, L ~3mm, R~4mm  V:  Facial sensation V1-V3 equal and intact   VII: Face is symmetric with normal eye closure and smile  VIII: Hearing is grossly intact bilaterally.   Motor: Normal bulk and tone. No pronator drift. Mild finger curl with subtle pronation in the RUE 4+/5, LUE 5/5. Bilateral lower extremities 5/5.  Sensation: Intact to light touch bilaterally. No neglect or extinction on double simultaneous testing.  Coordination: Mild dysmetria right arm on FTN.       LABS:                        14.0   8.19  )-----------( 376      ( 18 Jul 2022 05:30 )             41.2     07-18    138  |  104  |  15  ----------------------------<  107<H>  4.0   |  25  |  0.94    Ca    9.2      18 Jul 2022 05:30  Phos  3.0     07-17  Mg     2.0     07-18            RADIOLOGY & ADDITIONAL TESTS:       Neurology Stroke Progress Note    INTERVAL HPI/OVERNIGHT EVENTS:  Early this AM received 10mg IV hydral and given his 100mg losartan early for SBP 190s with good effect.   Patient seen and examined. Expressive aphasia a bit worse than yesterday, pt frustrated. Started on Keppra 250mg BID for sz ppx.   Repeat CTH stable. Started heparin gtt (goal 40-60)  Had 3 beats of vtach on monitor - cards re-consulted    MEDICATIONS  (STANDING):  atorvastatin 80 milliGRAM(s) Oral at bedtime  chlorhexidine 2% Cloths 1 Application(s) Topical <User Schedule>  famotidine    Tablet 40 milliGRAM(s) Oral daily  levETIRAcetam 250 milliGRAM(s) Oral every 12 hours  losartan 100 milliGRAM(s) Oral daily  melatonin 5 milliGRAM(s) Oral at bedtime  metoprolol tartrate 12.5 milliGRAM(s) Oral two times a day  pantoprazole    Tablet 40 milliGRAM(s) Oral before breakfast  tamsulosin 0.4 milliGRAM(s) Oral at bedtime    MEDICATIONS  (PRN):  acetaminophen     Tablet .. 650 milliGRAM(s) Oral every 6 hours PRN Temp greater or equal to 38C (100.4F), Mild Pain (1 - 3)  ondansetron Injectable 4 milliGRAM(s) IV Push every 6 hours PRN Nausea and/or Vomiting  senna 2 Tablet(s) Oral at bedtime PRN Constipation      Allergies    penicillin (Unknown)    Intolerances        Vital Signs Last 24 Hrs  T(C): 36.4 (18 Jul 2022 09:01), Max: 36.6 (18 Jul 2022 02:13)  T(F): 97.6 (18 Jul 2022 09:01), Max: 97.8 (18 Jul 2022 02:13)  HR: 70 (18 Jul 2022 08:30) (70 - 96)  BP: 130/66 (18 Jul 2022 08:30) (125/71 - 193/91)  BP(mean): 91 (18 Jul 2022 08:30) (88 - 135)  RR: 16 (18 Jul 2022 08:30) (16 - 17)  SpO2: 97% (18 Jul 2022 08:30) (95% - 99%)    Parameters below as of 18 Jul 2022 08:30  Patient On (Oxygen Delivery Method): room air        Physical exam:  Neurologic:  -Mental status: Awake, alert, oriented to person, place, and time. Speech with paraphrasic errors, decreased fluency compared to yesterday, and some word finding. Able to complete cross commands. No dysarthria.   -Cranial nerves:   II: Visual fields are full to confrontation.  III, IV, VI: Extraocular movements are intact without nystagmus. Pupils round and reactive to light, L ~3mm, R~4mm  V:  Facial sensation V1-V3 equal and intact   VII: Face is symmetric with normal eye closure and smile  VIII: Hearing is grossly intact bilaterally.   Motor: Normal bulk and tone. No pronator drift. Mild finger curl with subtle pronation in the RUE 4+/5, LUE 5/5. Bilateral lower extremities 5/5.  Sensation: Intact to light touch bilaterally. No neglect or extinction on double simultaneous testing.  Coordination: Mild dysmetria right arm on FTN.       LABS:                        14.0   8.19  )-----------( 376      ( 18 Jul 2022 05:30 )             41.2     07-18    138  |  104  |  15  ----------------------------<  107<H>  4.0   |  25  |  0.94    Ca    9.2      18 Jul 2022 05:30  Phos  3.0     07-17  Mg     2.0     07-18            RADIOLOGY & ADDITIONAL TESTS:       Neurology Stroke Progress Note    INTERVAL HPI/OVERNIGHT EVENTS:  Early this AM received 10mg IV hydral and given his 100mg losartan early for SBP 190s with good effect.   Patient seen and examined. Expressive aphasia a bit worse than yesterday, pt frustrated. Started on Keppra 250mg BID for sz ppx.   Repeat CTH stable. Started heparin gtt (goal 40-60)  Had 3 beats of vtach on monitor in the morning then Also went into 28beats of vtach while working with PT in the evening, cards said likely AIVR which is benign and normal in reperfusion state    MEDICATIONS  (STANDING):  atorvastatin 80 milliGRAM(s) Oral at bedtime  chlorhexidine 2% Cloths 1 Application(s) Topical <User Schedule>  famotidine    Tablet 40 milliGRAM(s) Oral daily  levETIRAcetam 250 milliGRAM(s) Oral every 12 hours  losartan 100 milliGRAM(s) Oral daily  melatonin 5 milliGRAM(s) Oral at bedtime  metoprolol tartrate 12.5 milliGRAM(s) Oral two times a day  pantoprazole    Tablet 40 milliGRAM(s) Oral before breakfast  tamsulosin 0.4 milliGRAM(s) Oral at bedtime    MEDICATIONS  (PRN):  acetaminophen     Tablet .. 650 milliGRAM(s) Oral every 6 hours PRN Temp greater or equal to 38C (100.4F), Mild Pain (1 - 3)  ondansetron Injectable 4 milliGRAM(s) IV Push every 6 hours PRN Nausea and/or Vomiting  senna 2 Tablet(s) Oral at bedtime PRN Constipation      Allergies    penicillin (Unknown)    Intolerances        Vital Signs Last 24 Hrs  T(C): 36.4 (18 Jul 2022 09:01), Max: 36.6 (18 Jul 2022 02:13)  T(F): 97.6 (18 Jul 2022 09:01), Max: 97.8 (18 Jul 2022 02:13)  HR: 70 (18 Jul 2022 08:30) (70 - 96)  BP: 130/66 (18 Jul 2022 08:30) (125/71 - 193/91)  BP(mean): 91 (18 Jul 2022 08:30) (88 - 135)  RR: 16 (18 Jul 2022 08:30) (16 - 17)  SpO2: 97% (18 Jul 2022 08:30) (95% - 99%)    Parameters below as of 18 Jul 2022 08:30  Patient On (Oxygen Delivery Method): room air        Physical exam:  Neurologic:  -Mental status: Awake, alert, oriented to person, place, and time. Speech with paraphrasic errors, decreased fluency compared to yesterday, and some word finding. Able to complete cross commands. No dysarthria.   -Cranial nerves:   II: Visual fields are full to confrontation.  III, IV, VI: Extraocular movements are intact without nystagmus. Pupils round and reactive to light, L ~3mm, R~4mm  V:  Facial sensation V1-V3 equal and intact   VII: Face is symmetric with normal eye closure and smile  VIII: Hearing is grossly intact bilaterally.   Motor: Normal bulk and tone. No pronator drift. Mild finger curl with subtle pronation in the RUE 4+/5, LUE 5/5. Bilateral lower extremities 5/5.  Sensation: Intact to light touch bilaterally. No neglect or extinction on double simultaneous testing.  Coordination: Mild dysmetria right arm on FTN.       LABS:                        14.0   8.19  )-----------( 376      ( 18 Jul 2022 05:30 )             41.2     07-18    138  |  104  |  15  ----------------------------<  107<H>  4.0   |  25  |  0.94    Ca    9.2      18 Jul 2022 05:30  Phos  3.0     07-17  Mg     2.0     07-18            RADIOLOGY & ADDITIONAL TESTS:  < from: CT Head No Cont (07.18.22 @ 13:23) >  Impression:    Since prior CT head 7/15/2022, continued evolution of left MCA infarction   and subarachnoid hemorrhage. No new demarcated territorial infarction or   new intracranial hemorrhage..    < end of copied text >       Neurology Stroke Progress Note    INTERVAL HPI/OVERNIGHT EVENTS:  Early this AM received 10mg IV hydral and given his 100mg losartan early for SBP 190s with good effect.   Patient seen and examined. Expressive aphasia a bit worse than yesterday, pt frustrated. Started on Keppra 250mg BID for sz ppx.   Repeat CTH stable. Started heparin gtt (goal 40-60)  Had 3 beats of vtach on monitor in the morning then also went into 28beats of vtach while working with PT in the evening, cards said confirmed sustained vtach. Increased lopressor to 25mg BID. Electrolytes ordered for 6pm. EP consulted.    MEDICATIONS  (STANDING):  atorvastatin 80 milliGRAM(s) Oral at bedtime  chlorhexidine 2% Cloths 1 Application(s) Topical <User Schedule>  famotidine    Tablet 40 milliGRAM(s) Oral daily  levETIRAcetam 250 milliGRAM(s) Oral every 12 hours  losartan 100 milliGRAM(s) Oral daily  melatonin 5 milliGRAM(s) Oral at bedtime  metoprolol tartrate 12.5 milliGRAM(s) Oral two times a day  pantoprazole    Tablet 40 milliGRAM(s) Oral before breakfast  tamsulosin 0.4 milliGRAM(s) Oral at bedtime    MEDICATIONS  (PRN):  acetaminophen     Tablet .. 650 milliGRAM(s) Oral every 6 hours PRN Temp greater or equal to 38C (100.4F), Mild Pain (1 - 3)  ondansetron Injectable 4 milliGRAM(s) IV Push every 6 hours PRN Nausea and/or Vomiting  senna 2 Tablet(s) Oral at bedtime PRN Constipation      Allergies    penicillin (Unknown)    Intolerances        Vital Signs Last 24 Hrs  T(C): 36.4 (18 Jul 2022 09:01), Max: 36.6 (18 Jul 2022 02:13)  T(F): 97.6 (18 Jul 2022 09:01), Max: 97.8 (18 Jul 2022 02:13)  HR: 70 (18 Jul 2022 08:30) (70 - 96)  BP: 130/66 (18 Jul 2022 08:30) (125/71 - 193/91)  BP(mean): 91 (18 Jul 2022 08:30) (88 - 135)  RR: 16 (18 Jul 2022 08:30) (16 - 17)  SpO2: 97% (18 Jul 2022 08:30) (95% - 99%)    Parameters below as of 18 Jul 2022 08:30  Patient On (Oxygen Delivery Method): room air        Physical exam:  Neurologic:  -Mental status: Awake, alert, oriented to person, place, and time. Speech with paraphrasic errors, decreased fluency compared to yesterday, and some word finding. Able to complete cross commands. No dysarthria.   -Cranial nerves:   II: Visual fields are full to confrontation.  III, IV, VI: Extraocular movements are intact without nystagmus. Pupils round and reactive to light, L ~3mm, R~4mm  V:  Facial sensation V1-V3 equal and intact   VII: Face is symmetric with normal eye closure and smile  VIII: Hearing is grossly intact bilaterally.   Motor: Normal bulk and tone. No pronator drift. Mild finger curl with subtle pronation in the RUE 4+/5, LUE 5/5. Bilateral lower extremities 5/5.  Sensation: Intact to light touch bilaterally. No neglect or extinction on double simultaneous testing.  Coordination: Mild dysmetria right arm on FTN.       LABS:                        14.0   8.19  )-----------( 376      ( 18 Jul 2022 05:30 )             41.2     07-18    138  |  104  |  15  ----------------------------<  107<H>  4.0   |  25  |  0.94    Ca    9.2      18 Jul 2022 05:30  Phos  3.0     07-17  Mg     2.0     07-18            RADIOLOGY & ADDITIONAL TESTS:  < from: CT Head No Cont (07.18.22 @ 13:23) >  Impression:    Since prior CT head 7/15/2022, continued evolution of left MCA infarction   and subarachnoid hemorrhage. No new demarcated territorial infarction or   new intracranial hemorrhage..    < end of copied text >       Neurology Stroke Progress Note    INTERVAL HPI/OVERNIGHT EVENTS:  Early this AM received 10mg IV hydral and given his 100mg losartan early for SBP 190s with good effect.   Patient seen and examined. Expressive aphasia a bit worse than yesterday, pt frustrated. Started on Keppra 250mg BID for sz ppx.   Repeat CTH stable. Started heparin gtt (goal 40-60)  Had 3 beats of vtach on monitor in the morning then also went into 28beats of vtach while working with PT in the evening, cards said confirmed sustained vtach. Increased lopressor to 25mg BID. Electrolytes ordered for 6pm. EP consult in AM.    MEDICATIONS  (STANDING):  atorvastatin 80 milliGRAM(s) Oral at bedtime  chlorhexidine 2% Cloths 1 Application(s) Topical <User Schedule>  famotidine    Tablet 40 milliGRAM(s) Oral daily  levETIRAcetam 250 milliGRAM(s) Oral every 12 hours  losartan 100 milliGRAM(s) Oral daily  melatonin 5 milliGRAM(s) Oral at bedtime  metoprolol tartrate 12.5 milliGRAM(s) Oral two times a day  pantoprazole    Tablet 40 milliGRAM(s) Oral before breakfast  tamsulosin 0.4 milliGRAM(s) Oral at bedtime    MEDICATIONS  (PRN):  acetaminophen     Tablet .. 650 milliGRAM(s) Oral every 6 hours PRN Temp greater or equal to 38C (100.4F), Mild Pain (1 - 3)  ondansetron Injectable 4 milliGRAM(s) IV Push every 6 hours PRN Nausea and/or Vomiting  senna 2 Tablet(s) Oral at bedtime PRN Constipation      Allergies    penicillin (Unknown)    Intolerances        Vital Signs Last 24 Hrs  T(C): 36.4 (18 Jul 2022 09:01), Max: 36.6 (18 Jul 2022 02:13)  T(F): 97.6 (18 Jul 2022 09:01), Max: 97.8 (18 Jul 2022 02:13)  HR: 70 (18 Jul 2022 08:30) (70 - 96)  BP: 130/66 (18 Jul 2022 08:30) (125/71 - 193/91)  BP(mean): 91 (18 Jul 2022 08:30) (88 - 135)  RR: 16 (18 Jul 2022 08:30) (16 - 17)  SpO2: 97% (18 Jul 2022 08:30) (95% - 99%)    Parameters below as of 18 Jul 2022 08:30  Patient On (Oxygen Delivery Method): room air        Physical exam:  Neurologic:  -Mental status: Awake, alert, oriented to person, place, and time. Speech with paraphrasic errors, decreased fluency compared to yesterday, and some word finding. Able to complete cross commands. No dysarthria.   -Cranial nerves:   II: Visual fields are full to confrontation.  III, IV, VI: Extraocular movements are intact without nystagmus. Pupils round and reactive to light, L ~3mm, R~4mm  V:  Facial sensation V1-V3 equal and intact   VII: Face is symmetric with normal eye closure and smile  VIII: Hearing is grossly intact bilaterally.   Motor: Normal bulk and tone. No pronator drift. Mild finger curl with subtle pronation in the RUE 4+/5, LUE 5/5. Bilateral lower extremities 5/5.  Sensation: Intact to light touch bilaterally. No neglect or extinction on double simultaneous testing.  Coordination: Mild dysmetria right arm on FTN.       LABS:                        14.0   8.19  )-----------( 376      ( 18 Jul 2022 05:30 )             41.2     07-18    138  |  104  |  15  ----------------------------<  107<H>  4.0   |  25  |  0.94    Ca    9.2      18 Jul 2022 05:30  Phos  3.0     07-17  Mg     2.0     07-18            RADIOLOGY & ADDITIONAL TESTS:  < from: CT Head No Cont (07.18.22 @ 13:23) >  Impression:    Since prior CT head 7/15/2022, continued evolution of left MCA infarction   and subarachnoid hemorrhage. No new demarcated territorial infarction or   new intracranial hemorrhage..    < end of copied text >

## 2022-07-18 NOTE — PROGRESS NOTE ADULT - NSPROGADDITIONALINFOA_GEN_ALL_CORE
Fellow Attestation:      76y Male with PMHx of Afib (on Xarelto), CVD (mid LAD, mid RCA, OM1, OM2, OM3 stents 2643-8193), HTN, HLD, GERD, and COVID infection 7/8/22 (tx w/ Paxlovid) presents to ED by EMS, found to be a thrombectomy case with L gaze, aphasia, R sided plegia. CTH showed hyperdense L MCA sign, confirmed on CTA. CTP showed significant mismatch. Patient was not a tpa candidate as he had likely taken Xeralto within 3 hours prior to arrival. S/p thrombectomy TICI 0 to 3. Of note, L ICA dissection (non-occlusive) was noted, likely iatrogenic. Stroke etiology more likely due to being off xarelto when patient was on paxlovid. CTH with SAH vs. contrast extravasation on L sylvian fissure. MRI confirmed SAH and multifocal areas of ischemia along left MCA territory.      Impression: Acute onset L gaze deviation, R Hemiplegia, Global Aphasia due to L cortical dysfunction arising from L MCA territory ischemia precipitated by cardioembolism in the setting of known atrial fibrillation. S/P TICI III recanalization      Plan:    - holding home xarelto for now, likely to switch to Eliquis when cleared to start   - repeat CTH today, if stable, starting Heparin gtt with goal ptt of 40-60 on 7/18  - started on keppra 250mg BID for sz ppx in the setting of SAH, to be discontinued upon discharge if no epileptic events noted  - continue atorvastatin 80mg daily   - q4hr stroke neuro checks and vitals  - Stroke Code HCT Results: Hyperdense left MCA which is suspicious for thrombus and acute left MCA territory infarct.  - Stroke Code CTA Results: Occlusion of the midportion of the left M1 segment to the proximal M2 segment and high-grade narrowing involving the origin of the left anterior temporal artery. Focal area of mild to moderate narrowing involving the origin of the right vertebral artery. No carotid stenosis.  - repeat CTH with SAH v L sylvian fissure.  - CTH 7/15 with SAH present, continue to hold AC/antithrombotics.   - MRI confirms left SAH and multifocal areas of ischemia along left MCA territory  - Had 3 beats of vtach on monitor - cards re-consulted on 7/18, f/u recs  - PT/OT/SLP - AR  - Stroke education

## 2022-07-19 LAB
ANION GAP SERPL CALC-SCNC: 13 MMOL/L — SIGNIFICANT CHANGE UP (ref 5–17)
APTT BLD: 46.9 SEC — HIGH (ref 27.5–35.5)
APTT BLD: 65 SEC — HIGH (ref 27.5–35.5)
BUN SERPL-MCNC: 16 MG/DL — SIGNIFICANT CHANGE UP (ref 7–23)
CALCIUM SERPL-MCNC: 8.8 MG/DL — SIGNIFICANT CHANGE UP (ref 8.4–10.5)
CHLORIDE SERPL-SCNC: 101 MMOL/L — SIGNIFICANT CHANGE UP (ref 96–108)
CO2 SERPL-SCNC: 23 MMOL/L — SIGNIFICANT CHANGE UP (ref 22–31)
CREAT SERPL-MCNC: 0.96 MG/DL — SIGNIFICANT CHANGE UP (ref 0.5–1.3)
EGFR: 81 ML/MIN/1.73M2 — SIGNIFICANT CHANGE UP
GLUCOSE SERPL-MCNC: 153 MG/DL — HIGH (ref 70–99)
HCT VFR BLD CALC: 41.4 % — SIGNIFICANT CHANGE UP (ref 39–50)
HGB BLD-MCNC: 14.1 G/DL — SIGNIFICANT CHANGE UP (ref 13–17)
MAGNESIUM SERPL-MCNC: 1.9 MG/DL — SIGNIFICANT CHANGE UP (ref 1.6–2.6)
MCHC RBC-ENTMCNC: 31.3 PG — SIGNIFICANT CHANGE UP (ref 27–34)
MCHC RBC-ENTMCNC: 34.1 GM/DL — SIGNIFICANT CHANGE UP (ref 32–36)
MCV RBC AUTO: 91.8 FL — SIGNIFICANT CHANGE UP (ref 80–100)
NRBC # BLD: 0 /100 WBCS — SIGNIFICANT CHANGE UP (ref 0–0)
PLATELET # BLD AUTO: 316 K/UL — SIGNIFICANT CHANGE UP (ref 150–400)
POTASSIUM SERPL-MCNC: 4 MMOL/L — SIGNIFICANT CHANGE UP (ref 3.5–5.3)
POTASSIUM SERPL-SCNC: 4 MMOL/L — SIGNIFICANT CHANGE UP (ref 3.5–5.3)
RBC # BLD: 4.51 M/UL — SIGNIFICANT CHANGE UP (ref 4.2–5.8)
RBC # FLD: 12.3 % — SIGNIFICANT CHANGE UP (ref 10.3–14.5)
SARS-COV-2 RNA SPEC QL NAA+PROBE: DETECTED
SODIUM SERPL-SCNC: 137 MMOL/L — SIGNIFICANT CHANGE UP (ref 135–145)
TROPONIN T SERPL-MCNC: <0.01 NG/ML — SIGNIFICANT CHANGE UP (ref 0–0.01)
WBC # BLD: 7.63 K/UL — SIGNIFICANT CHANGE UP (ref 3.8–10.5)
WBC # FLD AUTO: 7.63 K/UL — SIGNIFICANT CHANGE UP (ref 3.8–10.5)

## 2022-07-19 PROCEDURE — 70450 CT HEAD/BRAIN W/O DYE: CPT | Mod: 26

## 2022-07-19 PROCEDURE — 99232 SBSQ HOSP IP/OBS MODERATE 35: CPT

## 2022-07-19 PROCEDURE — 99233 SBSQ HOSP IP/OBS HIGH 50: CPT

## 2022-07-19 RX ORDER — HEPARIN SODIUM 5000 [USP'U]/ML
950 INJECTION INTRAVENOUS; SUBCUTANEOUS
Qty: 25000 | Refills: 0 | Status: DISCONTINUED | OUTPATIENT
Start: 2022-07-19 | End: 2022-07-20

## 2022-07-19 RX ORDER — METOPROLOL TARTRATE 50 MG
25 TABLET ORAL EVERY 12 HOURS
Refills: 0 | Status: DISCONTINUED | OUTPATIENT
Start: 2022-07-19 | End: 2022-07-20

## 2022-07-19 RX ORDER — HYDRALAZINE HCL 50 MG
5 TABLET ORAL ONCE
Refills: 0 | Status: DISCONTINUED | OUTPATIENT
Start: 2022-07-19 | End: 2022-07-19

## 2022-07-19 RX ORDER — MAGNESIUM SULFATE 500 MG/ML
1 VIAL (ML) INJECTION ONCE
Refills: 0 | Status: COMPLETED | OUTPATIENT
Start: 2022-07-19 | End: 2022-07-19

## 2022-07-19 RX ADMIN — HEPARIN SODIUM 10 UNIT(S)/HR: 5000 INJECTION INTRAVENOUS; SUBCUTANEOUS at 17:58

## 2022-07-19 RX ADMIN — LEVETIRACETAM 250 MILLIGRAM(S): 250 TABLET, FILM COATED ORAL at 22:31

## 2022-07-19 RX ADMIN — LOSARTAN POTASSIUM 100 MILLIGRAM(S): 100 TABLET, FILM COATED ORAL at 06:45

## 2022-07-19 RX ADMIN — PANTOPRAZOLE SODIUM 40 MILLIGRAM(S): 20 TABLET, DELAYED RELEASE ORAL at 06:45

## 2022-07-19 RX ADMIN — LEVETIRACETAM 250 MILLIGRAM(S): 250 TABLET, FILM COATED ORAL at 10:17

## 2022-07-19 RX ADMIN — Medication 100 GRAM(S): at 11:30

## 2022-07-19 RX ADMIN — ATORVASTATIN CALCIUM 80 MILLIGRAM(S): 80 TABLET, FILM COATED ORAL at 22:31

## 2022-07-19 RX ADMIN — FAMOTIDINE 40 MILLIGRAM(S): 10 INJECTION INTRAVENOUS at 11:30

## 2022-07-19 RX ADMIN — Medication 25 MILLIGRAM(S): at 17:50

## 2022-07-19 RX ADMIN — TAMSULOSIN HYDROCHLORIDE 0.4 MILLIGRAM(S): 0.4 CAPSULE ORAL at 22:31

## 2022-07-19 RX ADMIN — Medication 25 MILLIGRAM(S): at 06:44

## 2022-07-19 NOTE — PROGRESS NOTE ADULT - ASSESSMENT
76y Male with PMHx of Afib s/p ablation in 2021 (on Xarelto), CVD (mid LAD, mid RCA, OM1, OM2, OM3 stents 3584-7297), HTN, HLD, GERD, and COVID infection 7/8/22 (tx w/ Paxlovid) presents to ED by EMS, found to be a thrombectomy case with L gaze, aphasia, R sided plegia. CTH showed hyperdense L MCA sign, confirmed on CTA. CTP showed significant mismatch. Patient was not a tpa candidate as he had likely taken Xeralto within 3 hours prior to arrival. S/p thrombectomy. MRI confirmed SAH and acute left MCA ischemia. Cardiology was consulted 2/2 AIVR of 11 seconds seen on telemetry.     #AIVR  EKG: NSR, diffused t wave inversions present on admission ekg as well  Tele: AIVR vs NSTV at 100-120 bpm run of 11 seconds, PVCs, NSR, occasionally sinus bradycardia  Patient reported he was asymptomatic during the event, but was aware of it since he noted it on his apple watch.   Patient denies CP, SOB, or any exertional sxs, said he's ET was very good prior to the stroke.  Follows up with a cardiologist at [facility].  Please obtain records from patient's cardiologist regarding prior ekgs, ischemic work up etc.   ECHO limited: normal LV and RV function, mildly dilated LA, no effusion   Recommendations:  - Benign rhythm, reflects reperfusion state, should resolve over time      #NSVT  Patient predominantly NSR on tele with PVC as well as NSVT (14 seconds 7/18). History of CAD (mid LAD, mid RCA, OM1, OM2, OM3 stents 9519-9545).   Recommendations:  - Continue lopressor 25mg BID (uptitrated from 12.5 BID)  - Keep K>4, Mag>2      To be d/w attending 76y Male with PMHx of Afib s/p ablation in 2021 (on Xarelto), CVD (mid LAD, mid RCA, OM1, OM2, OM3 stents 0371-7608), HTN, HLD, GERD, and COVID infection 7/8/22 (tx w/ Paxlovid) presents to ED by EMS, found to be a thrombectomy case with L gaze, aphasia, R sided plegia. CTH showed hyperdense L MCA sign, confirmed on CTA. CTP showed significant mismatch. Patient was not a tpa candidate as he had likely taken Xeralto within 3 hours prior to arrival. S/p thrombectomy. MRI confirmed SAH and acute left MCA ischemia. Cardiology was consulted 2/2 AIVR of 11 seconds seen on telemetry.     #AIVR  EKG: NSR, diffused t wave inversions present on admission ekg as well  Tele: AIVR vs NSTV at 100-120 bpm run of 11 seconds, PVCs, NSR, occasionally sinus bradycardia  Patient reported he was asymptomatic during the event, but was aware of it since he noted it on his apple watch.   Patient denies CP, SOB, or any exertional sxs, said he's ET was very good prior to the stroke.  Follows up with a cardiologist at [facility].  Please obtain records from patient's cardiologist regarding prior ekgs, ischemic work up etc.   ECHO limited: normal LV and RV function, mildly dilated LA, no effusion   Recommendations:  - Benign rhythm, reflects reperfusion state, should resolve over time      #NSVT  Patient predominantly NSR on tele with PVC as well as NSVT (14 seconds 7/18). History of CAD (mid LAD, mid RCA, OM1, OM2, OM3 stents 4354-3833).   Recommendations:  - Continue lopressor 25mg BID (uptitrated from 12.5 BID)  - Keep K>4, Mag>2    #CAD  Hx of CAD (mid LAD, mid RCA, OM1, OM2, OM3 stents 5329-7394)  - Please restart home ASA daily if okay from stroke standpoint    Case d/w attending

## 2022-07-19 NOTE — CONSULT NOTE ADULT - SUBJECTIVE AND OBJECTIVE BOX
HPI:  77 y/o male with CAD s/p stent, HTN, HLD, atrial fibrillation s/p ablation at [facility], who had COVID and was placed on Paxlovid and therefore stopped xarelo for 5 days given interaction, found to have a new CVA.  EP called for 23 beats wide complex tachycardia in a structurally normal heart.    He had an ablation at [facility] many months ago and states he has had no further palpitations since then and feels like he has been afib free.  He was diagnosed with COVID and placed on Paxlovid and given the interaction stopped xarelto for 5 days.  Found to have syncope and bnon verbal.  CT scan with hyperdense left MCA which was suspicious for thrombus and acute left MCA territory infarct. CTA with occlusion of the midportion of the left M1 segment to the proximal M2 segment. CTP with abnormal perfusion left MCA mismatch.   s/p thrombectomy.   He had been on a 12 hour flight a week prior.      While doing OT yesterday he had 24 beats of asymptomatic wide complex tachycardia.  Echo with a normla EF.  Seen by cardiology.  On metoprolol.  No recurrent syncope.  No palpitations, chest pain, SOB.      At bedside with wife.  He has some expressive aphasia but improving.           PAST MEDICAL & SURGICAL HISTORY:  Afib  HTN (hypertension)  GERD (gastroesophageal reflux disease)  HLD (hyperlipidemia)  CVD (cardiovascular disease)  S/P ablation of atrial fibrillation  Presence of stent in LAD coronary artery  S/P right coronary artery (RCA) stent placement  S/P arterial stent              Social History:   no drugs        Inpatient Medications:   acetaminophen     Tablet .. 650 milliGRAM(s) Oral every 6 hours PRN  atorvastatin 80 milliGRAM(s) Oral at bedtime  chlorhexidine 2% Cloths 1 Application(s) Topical <User Schedule>  famotidine    Tablet 40 milliGRAM(s) Oral daily  heparin  Infusion 1000 Unit(s)/Hr IV Continuous <Continuous>  levETIRAcetam 250 milliGRAM(s) Oral every 12 hours  losartan 100 milliGRAM(s) Oral daily  metoprolol tartrate 25 milliGRAM(s) Oral every 12 hours  ondansetron Injectable 4 milliGRAM(s) IV Push every 6 hours PRN  pantoprazole    Tablet 40 milliGRAM(s) Oral before breakfast  senna 2 Tablet(s) Oral at bedtime PRN  tamsulosin 0.4 milliGRAM(s) Oral at bedtime      Allergies: penicillin (Unknown)      ROS:   CONSTITUTIONAL: No fever, weight loss   EYES: Pt denies  RESPIRATORY: No cough, wheezing, chills or hemoptysis; No Shortness of Breath  CARDIOVASCULAR: see HPI  GASTROINTESTINAL: Pt denies  NEUROLOGICAL: Pt denies  SKIN: Pt denies   PSYCHIATRIC: Pt denies  HEME/LYMPH: Pt denies    PHYSICAL:  T(C): 35.9 (07-19-22 @ 13:35), Max: 36.6 (07-19-22 @ 09:16)  HR: 82 (07-19-22 @ 12:05) (67 - 88)  BP: 135/69 (07-19-22 @ 12:05) (104/57 - 185/97)  RR: 16 (07-19-22 @ 12:05) (16 - 18)  SpO2: 96% (07-19-22 @ 12:05) (92% - 97%)     Appearance: No acute distress, well developed  Eyes: normal appearing conjunctiva, pupils and eyelids  Cardiovascular: Normal S1 S2, No JVD, No murmurs, No edema  Respiratory: Lungs clear to auscultation	bilaterally.  No wheeze, rhonchi, rales noted  Gastrointestinal:  Soft, NT/ND 	  Neurologic:  some expressive aphasia  Psych: A&Ox3, normal mood/affect  Musculoskeletal: no deformities noted  Skin: no rash noted, normal color and pigmentation.        LABS:                        14.1   7.63  )-----------( 316      ( 19 Jul 2022 09:59 )             41.4     07-19    137  |  101  |  16  ----------------------------<  153<H>  4.0   |  23  |  0.96    Ca    8.8       Phos  3.2       Mg     1.9    PT: 12.0 sec;   INR: 1.01    PTT:46.9 sec  TSH 0.975     EKG: EKGs in chart show NSR with some APC / blocked APCs    Telemetry: since on 7lach - lost of noise.  NSR rates .  One strip in paper chart shows sinus jayjay - in setting of acute stroke.  Another shows 24 beats wide complex tachycardia likely AT with aberrancy     ECHO: e< from: TTE Echo Complete w/o Contrast w/ Doppler (07.13.22 @ 13:52) >   1. Limited study obtained for evaluation of left ventricular function.   2. Normal left and right ventricular size and systolic function.   3. Mildly dilated left atrium.   4. No pericardial effusion.            Assessment Plan:  77 y/o male with CAD s/p stent, HTN, HLD, atrial fibrillation s/p ablation at [facility], who had COVID and was placed on Paxlovid and therefore stopped xarelo for 5 days given interaction, found to have a new CVA.  EP called for 23 beats wide complex tachycardia in a structurally normal heart.  Strip consistent with AT with aberrancy  He was asymptomatic and on Beta blocker.  Agree with cardiology - no further intervention from an EP perspective regarding this strip.  He was off xarelto for 5 days prior to stroke (given antiviral medication interaction).  Unclear if he was having any atrial fibrillation prior to this event and recommend starting xarelto as soon as cleared from a neuro standpoint.  He was tolerating oral anticoagulation prior to this and is fine resuming this.  No further EP interventions at this time

## 2022-07-19 NOTE — CONSULT NOTE ADULT - CONSULT REASON
AIVR seen on tele
stroke code
wide complex tachycardia
Medicine co-management
Acute altered mentation
PM&R evaluation

## 2022-07-19 NOTE — PROGRESS NOTE ADULT - SUBJECTIVE AND OBJECTIVE BOX
still w word finding difficulty although improving,  wife requesting isolations precautions to be discontinued  no increased weakness    Physical Examination:  General: elderly male, in NAD  CV: S1, S2, no murmurs  Lungs: CTABL, no wheezing  Abd: soft, NT/ND, +BS. Rodriguez catheter in place  Ext: No edema  Neuro: AAOx3, dysarthria noted. Strength grossly intact. Sensation intact.

## 2022-07-19 NOTE — PROGRESS NOTE ADULT - ASSESSMENT
76M with Afib (on Xarelto), CVD (mid LAD, mid RCA, OM1, OM2, OM3 stents 4062-6443), HTN, HLD, GERD, and COVID infection 7/8/22 (tx w/ Paxlovid) presents to ED by EMS, found to be a thrombectomy case with L gaze, aphasia, R sided plegia. CTH showed hyperdense L MCA sign, confirmed on CTA. CTP showed significant mismatch. Patient was not a tpa candidate as he had likely taken Xeralto within 3 hours prior to arrival. S/p thrombectomy. MRI confirmed SAH and acute left MCA ischemia. Noted to have episodes of accelerated ventricular rhythm with episodes of sinus bradycardia on tele. However, when asked he reports being asymptomatic. EKG shows NSR with T-wave inversions, unchanged from prior. Cardiology consulted.    #Acute left MCA CVA with SAH, with continued evolution  - reviewed repeat CTH today 7/19 with stable SAH; heparin drip was restarted last night per neuro.  - plan to switch AC to eliquis upon discharge  - continue atorvastatin 80mg daily   - CTA: Occlusion of the midportion of the left M1 segment to the proximal M2 segment and high-grade narrowing involving the origin of the left anterior temporal artery. Focal area of mild to moderate narrowing involving the origin of the right vertebral artery. No carotid stenosis.  - Physical therapy/Occupational therapy, acute rehab  - SLP    #Hypertension  - Remains with elevated BP readings  - Lopressor 25mg BID  - Increase losartan to 100mg daily   - TTE: Limited study, no PFO EF 65%    #Bradycardia, intermittent; NSVT  - cardiology following  - given hx of CAD and PCI hx, in addition to being asymptomatic, defer further workup    #Atrial fibrillation  - heparin drip  - lopressor 25mg BID  - Cardiology consulted, recommendations appreciated    #Hyperlipidemia   - continue atorvastatin 80mg daily     #COVID-19, asymptomatic  - discussed with infection control; repeated COVID19 PCR today (day 7) and positive  - discontinue isolation on 7/22 (10 days after pos COVID19 PCR)    #Urinary retention  - Voiding, but continues to retain requiring straight cath x3. Rodriguez now in place  - start tamsulosin 0.4mg qhs    discussed above with patient's PCP   discussed with neurology

## 2022-07-19 NOTE — PROGRESS NOTE ADULT - SUBJECTIVE AND OBJECTIVE BOX
INTERVAL EVENTS:    PAST MEDICAL & SURGICAL HISTORY:  Afib    HTN (hypertension)    GERD (gastroesophageal reflux disease)    HLD (hyperlipidemia)    CVD (cardiovascular disease)    S/P ablation of atrial fibrillation    Presence of stent in LAD coronary artery    S/P right coronary artery (RCA) stent placement    S/P arterial stent        MEDICATIONS  (STANDING):  atorvastatin 80 milliGRAM(s) Oral at bedtime  chlorhexidine 2% Cloths 1 Application(s) Topical <User Schedule>  famotidine    Tablet 40 milliGRAM(s) Oral daily  heparin  Infusion 1000 Unit(s)/Hr (10 mL/Hr) IV Continuous <Continuous>  levETIRAcetam 250 milliGRAM(s) Oral every 12 hours  losartan 100 milliGRAM(s) Oral daily  metoprolol tartrate 25 milliGRAM(s) Oral every 12 hours  pantoprazole    Tablet 40 milliGRAM(s) Oral before breakfast  tamsulosin 0.4 milliGRAM(s) Oral at bedtime    MEDICATIONS  (PRN):  acetaminophen     Tablet .. 650 milliGRAM(s) Oral every 6 hours PRN Temp greater or equal to 38C (100.4F), Mild Pain (1 - 3)  ondansetron Injectable 4 milliGRAM(s) IV Push every 6 hours PRN Nausea and/or Vomiting  senna 2 Tablet(s) Oral at bedtime PRN Constipation    T(F): 97.3 (07-19-22 @ 06:41), Max: 97.9 (07-18-22 @ 13:26)  HR: 80 (07-19-22 @ 04:24) (67 - 88)  BP: 104/57 (07-19-22 @ 04:24) (104/57 - 185/97)  BP(mean): 75 (07-19-22 @ 04:24) (75 - 134)  ABP: --  ABP(mean): --  RR: 18 (07-19-22 @ 04:24) (16 - 18)  SpO2: 95% (07-19-22 @ 04:24) (92% - 97%)    I/O Detail 24H 18 Jul 2022 07:01  -  19 Jul 2022 07:00  --------------------------------------------------------  IN:  Total IN: 0 mL    OUT:    Indwelling Catheter - Urethral (mL): 1650 mL    Voided (mL): 500 mL  Total OUT: 2150 mL    Total NET: -2150 mL          PHYSICAL EXAM:  GEN: NAD  HEENT: EOMI   RESP: CTA b/l  CV: RRR. Normal S1/S2. No m/r/g.  ABD: soft, non-distended  EXT: No edema   NEURO: alert and attentive    LABS:  CBC 07-18-22 @ 05:30                        14.0   8.19  )-----------( 376                   41.2       Hgb trend: 14.0 <-- , 15.2 <--   WBC trend: 8.19 <-- , 8.02 <--       CMP 07-18-22 @ 21:03    136  |  103  |  18  ----------------------------<  102<H>  3.8   |  21<L>  |  0.83    Ca    8.8      07-18-22 @ 21:03  Phos  3.2     07-18  Mg     1.7     07-18        Serum Cr trend: 0.83 <-- , 0.94 <-- , 0.94 <--     PT/INR - ( 18 Jul 2022 15:31 )   PT: 12.0 sec;   INR: 1.01          PTT - ( 19 Jul 2022 01:28 ):46.9 sec    Cardiac Markers           STUDIES:           INTERVAL EVENTS: NSVT overnight, up titrated beta blocker, asymptomatic    PAST MEDICAL & SURGICAL HISTORY:  Afib    HTN (hypertension)    GERD (gastroesophageal reflux disease)    HLD (hyperlipidemia)    CVD (cardiovascular disease)    S/P ablation of atrial fibrillation    Presence of stent in LAD coronary artery    S/P right coronary artery (RCA) stent placement    S/P arterial stent        MEDICATIONS  (STANDING):  atorvastatin 80 milliGRAM(s) Oral at bedtime  chlorhexidine 2% Cloths 1 Application(s) Topical <User Schedule>  famotidine    Tablet 40 milliGRAM(s) Oral daily  heparin  Infusion 1000 Unit(s)/Hr (10 mL/Hr) IV Continuous <Continuous>  levETIRAcetam 250 milliGRAM(s) Oral every 12 hours  losartan 100 milliGRAM(s) Oral daily  metoprolol tartrate 25 milliGRAM(s) Oral every 12 hours  pantoprazole    Tablet 40 milliGRAM(s) Oral before breakfast  tamsulosin 0.4 milliGRAM(s) Oral at bedtime    MEDICATIONS  (PRN):  acetaminophen     Tablet .. 650 milliGRAM(s) Oral every 6 hours PRN Temp greater or equal to 38C (100.4F), Mild Pain (1 - 3)  ondansetron Injectable 4 milliGRAM(s) IV Push every 6 hours PRN Nausea and/or Vomiting  senna 2 Tablet(s) Oral at bedtime PRN Constipation    T(F): 97.3 (07-19-22 @ 06:41), Max: 97.9 (07-18-22 @ 13:26)  HR: 80 (07-19-22 @ 04:24) (67 - 88)  BP: 104/57 (07-19-22 @ 04:24) (104/57 - 185/97)  BP(mean): 75 (07-19-22 @ 04:24) (75 - 134)  ABP: --  ABP(mean): --  RR: 18 (07-19-22 @ 04:24) (16 - 18)  SpO2: 95% (07-19-22 @ 04:24) (92% - 97%)    I/O Detail 24H 18 Jul 2022 07:01  -  19 Jul 2022 07:00  --------------------------------------------------------  IN:  Total IN: 0 mL    OUT:    Indwelling Catheter - Urethral (mL): 1650 mL    Voided (mL): 500 mL  Total OUT: 2150 mL    Total NET: -2150 mL          PHYSICAL EXAM:  GEN: NAD  HEENT: EOMI   RESP: CTA b/l  CV: RRR. Normal S1/S2. No m/r/g.  ABD: soft, non-distended  EXT: No edema   NEURO: alert and attentive    LABS:  CBC 07-18-22 @ 05:30                        14.0   8.19  )-----------( 376                   41.2       Hgb trend: 14.0 <-- , 15.2 <--   WBC trend: 8.19 <-- , 8.02 <--       CMP 07-18-22 @ 21:03    136  |  103  |  18  ----------------------------<  102<H>  3.8   |  21<L>  |  0.83    Ca    8.8      07-18-22 @ 21:03  Phos  3.2     07-18  Mg     1.7     07-18        Serum Cr trend: 0.83 <-- , 0.94 <-- , 0.94 <--     PT/INR - ( 18 Jul 2022 15:31 )   PT: 12.0 sec;   INR: 1.01          PTT - ( 19 Jul 2022 01:28 ):46.9 sec    Cardiac Markers           STUDIES:

## 2022-07-19 NOTE — PROGRESS NOTE ADULT - ASSESSMENT
per Neurology    76 y o Male with PMHx of Afib (on Xarelto), CVD (mid LAD, mid RCA, OM1, OM2, OM3 stents 6072-1725), HTN, HLD, GERD, and COVID infection 7/8/22 (tx w/ Paxlovid) presents to ED by EMS, found to be a thrombectomy case with L gaze, aphasia, R sided plegia. CTH showed hyperdense L MCA sign, confirmed on CTA. CTP showed significant mismatch. Patient was not a tpa candidate as he had likely taken Xeralto within 3 hours prior to arrival. S/p thrombectomy TICI 0 to 3. Of note, L ICA dissection (non-occlusive) was noted, likely iatrogenic. Stroke etiology more likely due to being off xarelto when patient was on paxlovid. CTH with SAH vs. contrast extravasation on L sylvian fissure. MRI confirmed SAH and multifocal areas of ischemia along left MCA territory.    Neuro  #CVA workup  - holding home xarelto for now, likely to switch to eliquis when cleared to start   - Repeat CTH overnight stable. c/w heparin gtt (goal 40-60) as patient has underlying afib. Will repeat another scan tmmr and if stable will transition to oral AC  - c/w keppra 250mg BID for sz ppx  - continue atorvastatin 80mg daily   - q4hr stroke neuro checks and vitals  - Stroke Code HCT Results: Hyperdense left MCA which is suspicious for thrombus and acute left MCA territory infarct.  - Stroke Code CTA Results: Occlusion of the midportion of the left M1 segment to the proximal M2 segment and high-grade narrowing involving the origin of the left anterior temporal artery. Focal area of mild to moderate narrowing involving the origin of the right vertebral artery. No carotid stenosis.  - repeat CTH with SAH vs. contrast extravasation on L sylvian fissure.  - CTH 7/15 with SAH present, continue to hold AC/antithrombotics.   - MRI confirms left SAH and multifocal areas of ischemia along left MCA territory  - PT/OT/SLP  - Stroke education    Cards  #HTN  - BP goal 120-160  - c/w losartan 100mg daily (home dose)  - Bradycardic down to the 30s 7/15. Asymptomatic  - ekg 7/15 with NSR with APC  - EKG 7/16 NSR  # Cardiology was consulted on 7/17 for NSVR of 11 seconds seen on telemetry. Patient remained asx.  - EKG 7/17: NSR, diffused t wave inversions present on admission ekg as well  - Tele: AIVR at 100-120 bpm run of 11 seconds, PVCs, NSR, occasionally sinus bradycardia  - echo:  Limited study obtained for evaluation of left ventricular function. Normal left and right ventricular size and systolic function. Mildly dilated left atrium. No pericardial effusion. The left ventricle is normal in size and systolic function with a calculated ejection fraction of 65%.  -beats of vtach tele this am and overnight   -c/w lopressor to 25mg BID  -Cards following  - EP consulted, f/u recs    - hospitalist Dr. Hernandez spoke to patient's PCP - [facility] present as outpatient, no other ischemic workup after 2019, overall patent stents      #afib  -c/w heparin drip  - C/w BB, ok with switching succinate to tartrate as per cards.       #HLD  - high dose statin as above in CVA  - LDL results: 54    Pulm  - call provider if SPO2 < 94%    GI  #Nutrition/Fluids/Electrolytes   - replete K<4 and Mg <2  - Diet: regular  - cont home PPI    Renal  - daily BMP    Infectious Disease  COVID + 7/8, started on paxlovid OSH  - afebrile  - COVID+, isolation precautions  - CXR: Heart size within normal limits, thoracic aortic calcification. Lungs and mediastinum are unremarkable. Thoracic spine degenerative changes, dextroscoliosis. Elevation of the right hemidiaphragm.    Endocrine  - A1C results: 5.6  - TSH results: 0.975    Urology  #urinary retention  Voiding, but continues to retain requiring straight cath x3  - initially declines saleh, but eventually agreed if another straight cath needed - saleh in place  - c/w tamsulosin 0.4mg     DVT Prophylaxis  - SCDs   - LE dopplers negative    Dispo: acute rehab. Since his initial COVID test was an at home rapid, 10 day isolation period required for transfer to acute rehab began on 7/12 from positive in-house PCR test. Will need to remain in isolation until at least 7/22.

## 2022-07-19 NOTE — PROGRESS NOTE ADULT - SUBJECTIVE AND OBJECTIVE BOX
Neurology Stroke Progress Note    INTERVAL HPI/OVERNIGHT EVENTS:  Patient seen and examined. Overnight patient noted to have sustained vtach. This am patient frustrated by lack of ability to move to chair w/o permission. Reinforced risk of trauma and subsequent bleed. Patient agreeable to notify staff if and when he wants to move around    MEDICATIONS  (STANDING):  atorvastatin 80 milliGRAM(s) Oral at bedtime  chlorhexidine 2% Cloths 1 Application(s) Topical <User Schedule>  famotidine    Tablet 40 milliGRAM(s) Oral daily  heparin  Infusion 1000 Unit(s)/Hr (10 mL/Hr) IV Continuous <Continuous>  levETIRAcetam 250 milliGRAM(s) Oral every 12 hours  losartan 100 milliGRAM(s) Oral daily  metoprolol tartrate 25 milliGRAM(s) Oral every 12 hours  pantoprazole    Tablet 40 milliGRAM(s) Oral before breakfast  tamsulosin 0.4 milliGRAM(s) Oral at bedtime    MEDICATIONS  (PRN):  acetaminophen     Tablet .. 650 milliGRAM(s) Oral every 6 hours PRN Temp greater or equal to 38C (100.4F), Mild Pain (1 - 3)  ondansetron Injectable 4 milliGRAM(s) IV Push every 6 hours PRN Nausea and/or Vomiting  senna 2 Tablet(s) Oral at bedtime PRN Constipation    Allergies    penicillin (Unknown)    Intolerances      Vital Signs Last 24 Hrs  T(C): 36.6 (19 Jul 2022 09:16), Max: 36.6 (18 Jul 2022 13:26)  T(F): 97.8 (19 Jul 2022 09:16), Max: 97.9 (18 Jul 2022 13:26)  HR: 82 (19 Jul 2022 12:05) (67 - 88)  BP: 135/69 (19 Jul 2022 12:05) (104/57 - 185/97)  BP(mean): 94 (19 Jul 2022 12:05) (75 - 134)  RR: 16 (19 Jul 2022 12:05) (16 - 18)  SpO2: 96% (19 Jul 2022 12:05) (92% - 97%)    Parameters below as of 19 Jul 2022 12:05  Patient On (Oxygen Delivery Method): room air      Physical exam:  General: No acute distress, awake and alert  Eyes: Anicteric sclerae, moist conjunctivae, see below for CNs  Neck: trachea midline, supple  Cardiovascular: Regular rate and rhythm, no murmurs, rubs, or gallops. No carotid bruits.   Pulmonary: Anterior breath sounds clear bilaterally, no crackles or wheezing. No use of accessory muscles  GI: Abdomen soft, non-distended, non-tender  Extremities: no edema    Neurologic:  -Mental status: Awake, alert, oriented to person, place, and time. expressive aphasia, language is fragmented, comprehension and attention intact, no dysarthria  -Cranial nerves:   II: Visual fields are full to confrontation.  III, IV, VI: Extraocular movements are intact without nystagmus. Pupils equally round and reactive to light  V:  Facial sensation V1-V3 equal and intact   VII: Face is symmetric with normal eye closure and smile  VIII: Hearing is bilaterally intact to finger rub  IX, X: Uvula is midline and soft palate rises symmetrically  XI: Head turning and shoulder shrug are intact.  XII: Tongue protrudes midline  Motor: Normal bulk and tone. No pronator drift. Strength bilateral upper extremity 5/5, bilateral lower extremities 5/5.  Sensation: Intact to light touch bilaterally. No neglect or extinction on double simultaneous testing.  Coordination: No dysmetria on finger-to-nose and heel-to-shin bilaterally  Reflexes: Downgoing toes bilaterally       LABS:                        14.1   7.63  )-----------( 316      ( 19 Jul 2022 09:59 )             41.4     07-19    137  |  101  |  16  ----------------------------<  153<H>  4.0   |  23  |  0.96    Ca    8.8      19 Jul 2022 09:59  Phos  3.2     07-18  Mg     1.9     07-19      PT/INR - ( 18 Jul 2022 15:31 )   PT: 12.0 sec;   INR: 1.01          PTT - ( 19 Jul 2022 01:28 )  PTT:46.9 sec      RADIOLOGY & ADDITIONAL TESTS:

## 2022-07-19 NOTE — PROGRESS NOTE ADULT - ASSESSMENT
76y Male with PMHx of Afib (on Xarelto), CVD (mid LAD, mid RCA, OM1, OM2, OM3 stents 4111-4950), HTN, HLD, GERD, and COVID infection 7/8/22 (tx w/ Paxlovid) presents to ED by EMS, found to be a thrombectomy case with L gaze, aphasia, R sided plegia. CTH showed hyperdense L MCA sign, confirmed on CTA. CTP showed significant mismatch. Patient was not a tpa candidate as he had likely taken Xeralto within 3 hours prior to arrival. S/p thrombectomy TICI 0 to 3. Of note, L ICA dissection (non-occlusive) was noted, likely iatrogenic. Stroke etiology more likely due to being off xarelto when patient was on paxlovid. CTH with SAH vs. contrast extravasation on L sylvian fissure. MRI confirmed SAH and multifocal areas of ischemia along left MCA territory.    Neuro  #CVA workup  - holding home xarelto for now, likely to switch to eliquis when cleared to start   - Repeat CTH overnight stable. c/w heparin gtt (goal 40-60) as patient has underlying afib. Will repeat another scan tmmr and if stable will transition to oral AC  - c/w keppra 250mg BID for sz ppx  - continue atorvastatin 80mg daily   - q4hr stroke neuro checks and vitals  - Stroke Code HCT Results: Hyperdense left MCA which is suspicious for thrombus and acute left MCA territory infarct.  - Stroke Code CTA Results: Occlusion of the midportion of the left M1 segment to the proximal M2 segment and high-grade narrowing involving the origin of the left anterior temporal artery. Focal area of mild to moderate narrowing involving the origin of the right vertebral artery. No carotid stenosis.  - repeat CTH with SAH vs. contrast extravasation on L sylvian fissure.  - CTH 7/15 with SAH present, continue to hold AC/antithrombotics.   - MRI confirms left SAH and multifocal areas of ischemia along left MCA territory  - PT/OT/SLP  - Stroke education    Cards  #HTN  - BP goal 120-160  - c/w losartan 100mg daily (home dose)  - Bradycardic down to the 30s 7/15. Asymptomatic  - ekg 7/15 with NSR with APC  - EKG 7/16 NSR  # Cardiology was consulted on 7/17 for NSVR of 11 seconds seen on telemetry. Patient remained asx.  - EKG 7/17: NSR, diffused t wave inversions present on admission ekg as well  - Tele: AIVR at 100-120 bpm run of 11 seconds, PVCs, NSR, occasionally sinus bradycardia  - echo:  Limited study obtained for evaluation of left ventricular function. Normal left and right ventricular size and systolic function. Mildly dilated left atrium. No pericardial effusion. The left ventricle is normal in size and systolic function with a calculated ejection fraction of 65%.  -beats of vtach tele this am and overnight   -c/w lopressor to 25mg BID  -Cards following  - EP consulted, f/u recs    - hospitalist Dr. Hernandez spoke to patient's PCP - TWI present as outpatient, no other ischemic workup after 2019, overall patent stents      #afib  -c/w heparin drip  - C/w BB, ok with switching succinate to tartrate as per cards.       #HLD  - high dose statin as above in CVA  - LDL results: 54    Pulm  - call provider if SPO2 < 94%    GI  #Nutrition/Fluids/Electrolytes   - replete K<4 and Mg <2  - Diet: regular  - cont home PPI    Renal  - daily BMP    Infectious Disease  COVID + 7/8, started on paxlovid OSH  - afebrile  - COVID+, isolation precautions  - CXR: Heart size within normal limits, thoracic aortic calcification. Lungs and mediastinum are unremarkable. Thoracic spine degenerative changes, dextroscoliosis. Elevation of the right hemidiaphragm.    Endocrine  - A1C results: 5.6  - TSH results: 0.975    Urology  #urinary retention  Voiding, but continues to retain requiring straight cath x3  - initially declines saleh, but eventually agreed if another straight cath needed - saleh in place  - c/w tamsulosin 0.4mg     DVT Prophylaxis  - SCDs   - LE dopplers negative    Dispo: acute rehab. Since his initial COVID test was an at home rapid, 10 day isolation period required for transfer to acute rehab began on 7/12 from positive in-house PCR test. Will need to remain in isolation until at least 7/22.     PT/OT: AR    Discussed with Neurology Attending Dr. Mondragon    Pending: EP eval, will repeat covid PCR, monitor PTT, plan for repeat CTH tmmr and transition to oral AC if CTH stable

## 2022-07-19 NOTE — PROGRESS NOTE ADULT - SUBJECTIVE AND OBJECTIVE BOX
Physical Medicine and Rehabilitation Progress Note :    Patient is a 76y old  Male who presents with a chief complaint of Left MCA stroke (19 Jul 2022 13:12)      HPI:  75 y/o male non-smoker w/ pmHx of Afib (on Xarelto), CVD (mid LAD, mid RCA, OM1, OM2, OM3 stents 6096-7465), HTN, HLD, GERD, and COVID infection 7/8/22 (tx w/ Paxlovid) presented to ED for unwitnessed fall around 10:15am 7/12/22. Patient's spouse states she heard a bang in a separate room at the house and found her  on the carpet. Patient was conscious, facedown, non-verbal, and moving only one of his arms (spouse could not recall right or left). No head trauma, LOC, or seizure activity was witnessed. This has never happened before.      Upon arrival to ED, patient had L gaze, aphasic, R sided plegia. CTH with hyperdense left MCA which was suspicious for thrombus and acute left MCA territory infarct. CTA with occlusion of the midportion of the left M1 segment to the proximal M2 segment. CTP with abnormal perfusion left MCA mismatch. BP in ED 170s/100.    Of note, wife reports they just got back from a 3 week trip from [facility] and were on a 12 hour flight on 7/7. Patient tested positive for COVID on 7/8 and was prescribed Paxlovid. Wife states patient usually takes his xarelto and other medications every morning around 9 or 10am, is unsure if he took them today. Patient is receiving care with cardiologist Dr. Brett Raya of [place] ([phone]). Patient is s/p mid LAD stent (April 2019), mid RCA stent (2016), and OM1-OM3 stents (April 2014).     Decision was made to take patient to thrombectomy with verbal consent from spouse. Patient was urgently taken to cath lab. (12 Jul 2022 13:03)                            14.1   7.63  )-----------( 316      ( 19 Jul 2022 09:59 )             41.4       07-19    137  |  101  |  16  ----------------------------<  153<H>  4.0   |  23  |  0.96    Ca    8.8      19 Jul 2022 09:59  Phos  3.2     07-18  Mg     1.9     07-19      Vital Signs Last 24 Hrs  T(C): 35.9 (19 Jul 2022 13:35), Max: 36.6 (19 Jul 2022 09:16)  T(F): 96.6 (19 Jul 2022 13:35), Max: 97.8 (19 Jul 2022 09:16)  HR: 82 (19 Jul 2022 12:05) (67 - 88)  BP: 135/69 (19 Jul 2022 12:05) (104/57 - 185/97)  BP(mean): 94 (19 Jul 2022 12:05) (75 - 134)  RR: 16 (19 Jul 2022 12:05) (16 - 18)  SpO2: 96% (19 Jul 2022 12:05) (92% - 97%)    Parameters below as of 19 Jul 2022 12:05  Patient On (Oxygen Delivery Method): room air        MEDICATIONS  (STANDING):  atorvastatin 80 milliGRAM(s) Oral at bedtime  chlorhexidine 2% Cloths 1 Application(s) Topical <User Schedule>  famotidine    Tablet 40 milliGRAM(s) Oral daily  heparin  Infusion 1000 Unit(s)/Hr (10 mL/Hr) IV Continuous <Continuous>  levETIRAcetam 250 milliGRAM(s) Oral every 12 hours  losartan 100 milliGRAM(s) Oral daily  metoprolol tartrate 25 milliGRAM(s) Oral every 12 hours  pantoprazole    Tablet 40 milliGRAM(s) Oral before breakfast  tamsulosin 0.4 milliGRAM(s) Oral at bedtime    MEDICATIONS  (PRN):  acetaminophen     Tablet .. 650 milliGRAM(s) Oral every 6 hours PRN Temp greater or equal to 38C (100.4F), Mild Pain (1 - 3)  ondansetron Injectable 4 milliGRAM(s) IV Push every 6 hours PRN Nausea and/or Vomiting  senna 2 Tablet(s) Oral at bedtime PRN Constipation    Currently Undergoing Physical/ Occupational Therapy at bedside    OT Functional Status Assessment :   7/18/2022        Cognitive/Neuro/Behavioral  Cognitive/Neuro/Behavioral [WDL Definition: Alert; opens eyes spontaneously; arouses to voice or touch; oriented x 4; follows commands; speech spontaneous, logical; purposeful motor response; behavior appropriate to situation]: WDL except  Level of Consciousness: alert  Arousal Level: arouses to voice  Orientation: oriented x 4;  with choices 2/2 aphagia   Speech: slurred;  expressive aphagia   Mood/Behavior: calm;  cooperative    Language Assistance  Preferred Language to Address Healthcare Preferred Language to Address Healthcare: English    Therapeutic Interventions      Bed Mobility  Bed Mobility Training Sit-to-Supine: supervision;  verbal cues  Bed Mobility Training Supine-to-Sit: supervision;  verbal cues  Bed Mobility Training Limitations: impaired balance;  decreased strength    Neuromuscular Re-education  Neuromuscular Re-education Detail: While sitting EOB; re-assessed opposition, F-N-F; intact. Pt performed reaching B UE/LE to object x5           PM&R Impression : as above    Current Disposition Plan Recommendations :    acute rehab placement

## 2022-07-20 LAB
ALBUMIN SERPL ELPH-MCNC: 3.9 G/DL — SIGNIFICANT CHANGE UP (ref 3.3–5)
ALP SERPL-CCNC: 109 U/L — SIGNIFICANT CHANGE UP (ref 40–120)
ALT FLD-CCNC: 75 U/L — HIGH (ref 10–45)
ANION GAP SERPL CALC-SCNC: 12 MMOL/L — SIGNIFICANT CHANGE UP (ref 5–17)
APTT BLD: 45.3 SEC — HIGH (ref 27.5–35.5)
APTT BLD: 64.9 SEC — HIGH (ref 27.5–35.5)
APTT BLD: 80.4 SEC — HIGH (ref 27.5–35.5)
AST SERPL-CCNC: 65 U/L — HIGH (ref 10–40)
BILIRUB SERPL-MCNC: 0.5 MG/DL — SIGNIFICANT CHANGE UP (ref 0.2–1.2)
BUN SERPL-MCNC: 17 MG/DL — SIGNIFICANT CHANGE UP (ref 7–23)
CALCIUM SERPL-MCNC: 9.6 MG/DL — SIGNIFICANT CHANGE UP (ref 8.4–10.5)
CHLORIDE SERPL-SCNC: 103 MMOL/L — SIGNIFICANT CHANGE UP (ref 96–108)
CO2 SERPL-SCNC: 23 MMOL/L — SIGNIFICANT CHANGE UP (ref 22–31)
CREAT SERPL-MCNC: 0.9 MG/DL — SIGNIFICANT CHANGE UP (ref 0.5–1.3)
EGFR: 88 ML/MIN/1.73M2 — SIGNIFICANT CHANGE UP
GLUCOSE SERPL-MCNC: 100 MG/DL — HIGH (ref 70–99)
HCT VFR BLD CALC: 44.6 % — SIGNIFICANT CHANGE UP (ref 39–50)
HGB BLD-MCNC: 15 G/DL — SIGNIFICANT CHANGE UP (ref 13–17)
MAGNESIUM SERPL-MCNC: 2 MG/DL — SIGNIFICANT CHANGE UP (ref 1.6–2.6)
MCHC RBC-ENTMCNC: 30.8 PG — SIGNIFICANT CHANGE UP (ref 27–34)
MCHC RBC-ENTMCNC: 33.6 GM/DL — SIGNIFICANT CHANGE UP (ref 32–36)
MCV RBC AUTO: 91.6 FL — SIGNIFICANT CHANGE UP (ref 80–100)
NRBC # BLD: 0 /100 WBCS — SIGNIFICANT CHANGE UP (ref 0–0)
PLATELET # BLD AUTO: 306 K/UL — SIGNIFICANT CHANGE UP (ref 150–400)
POTASSIUM SERPL-MCNC: 4.3 MMOL/L — SIGNIFICANT CHANGE UP (ref 3.5–5.3)
POTASSIUM SERPL-SCNC: 4.3 MMOL/L — SIGNIFICANT CHANGE UP (ref 3.5–5.3)
PROT SERPL-MCNC: 7 G/DL — SIGNIFICANT CHANGE UP (ref 6–8.3)
RBC # BLD: 4.87 M/UL — SIGNIFICANT CHANGE UP (ref 4.2–5.8)
RBC # FLD: 12.4 % — SIGNIFICANT CHANGE UP (ref 10.3–14.5)
SODIUM SERPL-SCNC: 138 MMOL/L — SIGNIFICANT CHANGE UP (ref 135–145)
WBC # BLD: 10.07 K/UL — SIGNIFICANT CHANGE UP (ref 3.8–10.5)
WBC # FLD AUTO: 10.07 K/UL — SIGNIFICANT CHANGE UP (ref 3.8–10.5)

## 2022-07-20 PROCEDURE — 70450 CT HEAD/BRAIN W/O DYE: CPT | Mod: 26

## 2022-07-20 PROCEDURE — 99233 SBSQ HOSP IP/OBS HIGH 50: CPT

## 2022-07-20 PROCEDURE — 99232 SBSQ HOSP IP/OBS MODERATE 35: CPT

## 2022-07-20 RX ORDER — METOPROLOL TARTRATE 50 MG
50 TABLET ORAL DAILY
Refills: 0 | Status: DISCONTINUED | OUTPATIENT
Start: 2022-07-21 | End: 2022-07-23

## 2022-07-20 RX ORDER — APIXABAN 2.5 MG/1
5 TABLET, FILM COATED ORAL EVERY 12 HOURS
Refills: 0 | Status: DISCONTINUED | OUTPATIENT
Start: 2022-07-20 | End: 2022-07-23

## 2022-07-20 RX ORDER — HEPARIN SODIUM 5000 [USP'U]/ML
800 INJECTION INTRAVENOUS; SUBCUTANEOUS
Qty: 25000 | Refills: 0 | Status: DISCONTINUED | OUTPATIENT
Start: 2022-07-20 | End: 2022-07-20

## 2022-07-20 RX ORDER — METOPROLOL TARTRATE 50 MG
25 TABLET ORAL ONCE
Refills: 0 | Status: COMPLETED | OUTPATIENT
Start: 2022-07-20 | End: 2022-07-20

## 2022-07-20 RX ORDER — HEPARIN SODIUM 5000 [USP'U]/ML
900 INJECTION INTRAVENOUS; SUBCUTANEOUS
Qty: 25000 | Refills: 0 | Status: DISCONTINUED | OUTPATIENT
Start: 2022-07-20 | End: 2022-07-20

## 2022-07-20 RX ADMIN — Medication 25 MILLIGRAM(S): at 06:45

## 2022-07-20 RX ADMIN — LEVETIRACETAM 250 MILLIGRAM(S): 250 TABLET, FILM COATED ORAL at 22:40

## 2022-07-20 RX ADMIN — LOSARTAN POTASSIUM 100 MILLIGRAM(S): 100 TABLET, FILM COATED ORAL at 06:41

## 2022-07-20 RX ADMIN — APIXABAN 5 MILLIGRAM(S): 2.5 TABLET, FILM COATED ORAL at 18:11

## 2022-07-20 RX ADMIN — ATORVASTATIN CALCIUM 80 MILLIGRAM(S): 80 TABLET, FILM COATED ORAL at 22:40

## 2022-07-20 RX ADMIN — LEVETIRACETAM 250 MILLIGRAM(S): 250 TABLET, FILM COATED ORAL at 08:49

## 2022-07-20 RX ADMIN — Medication 25 MILLIGRAM(S): at 18:20

## 2022-07-20 RX ADMIN — PANTOPRAZOLE SODIUM 40 MILLIGRAM(S): 20 TABLET, DELAYED RELEASE ORAL at 06:41

## 2022-07-20 RX ADMIN — HEPARIN SODIUM 8 UNIT(S)/HR: 5000 INJECTION INTRAVENOUS; SUBCUTANEOUS at 09:00

## 2022-07-20 RX ADMIN — FAMOTIDINE 40 MILLIGRAM(S): 10 INJECTION INTRAVENOUS at 12:31

## 2022-07-20 RX ADMIN — TAMSULOSIN HYDROCHLORIDE 0.4 MILLIGRAM(S): 0.4 CAPSULE ORAL at 22:39

## 2022-07-20 NOTE — PROGRESS NOTE ADULT - ASSESSMENT
76M with Afib (on Xarelto), CVD (mid LAD, mid RCA, OM1, OM2, OM3 stents 8973-5733), HTN, HLD, GERD, and COVID infection 7/8/22 (tx w/ Paxlovid) presents to ED by EMS, found to be a thrombectomy case with L gaze, aphasia, R sided plegia. CTH showed hyperdense L MCA sign, confirmed on CTA. CTP showed significant mismatch. Patient was not a tpa candidate as he had likely taken Xeralto within 3 hours prior to arrival. S/p thrombectomy. MRI confirmed SAH and acute left MCA ischemia. Noted to have episodes of accelerated ventricular rhythm with episodes of sinus bradycardia on tele. However, when asked he reports being asymptomatic. EKG shows NSR with T-wave inversions, unchanged from prior. Cardiology consulted.    #Acute left MCA CVA with SAH, with continued evolution  - CTH 7/20 today independent review SAH looks stable; pending official read  - labs unremarkable today  -  reviewed repeat CTH 7/19 with stable SAH;  - plan to switch AC to eliquis upon discharge  - continue atorvastatin 80mg daily   - CTA: Occlusion of the midportion of the left M1 segment to the proximal M2 segment and high-grade narrowing involving the origin of the left anterior temporal artery. Focal area of mild to moderate narrowing involving the origin of the right vertebral artery. No carotid stenosis.  - Physical therapy/Occupational therapy, acute rehab  - SLP    #Hypertension  - Remains with elevated BP readings  - Lopressor 25mg BID  - Increase losartan to 100mg daily   - TTE: Limited study, no PFO EF 65%    #Bradycardia, intermittent; NSVT  - cardiology following  - given hx of CAD and PCI hx, in addition to being asymptomatic, defer further workup    #Atrial fibrillation  - heparin drip  - lopressor 25mg BID  - Cardiology consulted, recommendations appreciated    #Hyperlipidemia   - continue atorvastatin 80mg daily     #COVID-19, asymptomatic  - discontinue isolation on 7/22 (10 days after pos COVID19 PCR)    #Urinary retention  - TOV today    plan for dc to [facility] this Saturday 7/23

## 2022-07-20 NOTE — PROGRESS NOTE ADULT - ATTENDING COMMENTS
Initial attending contact date7/18/22      . See fellow note written above for details. I reviewed the fellow documentation. I have personally seen and examined this patient. I reviewed vitals, labs, medications, cardiac studies, and additional imaging. I agree with the above fellow's findings and plans as written above with the following additions/statements.    76y Male with PMHx of Afib s/p ablation in 2021 (on Xarelto), CAD (mid LAD, mid RCA, OM1, OM2, OM3 stents 4419-8812), HTN, HLD, GERD, and COVID infection 7/8/22 (tx w/ Paxlovid) admitted with L MCA CVA, now s/p thrombectomy. MRI and CT head with evolving MCA ischemia/hemorrahge. Cardiology was consulted 2/2 AIVR of 11 seconds seen on telemetry.   -Tele reviewed: predominantly NSR with few PVCS,  11 sec of accelerated idioventricular rhythm noted   -AIVR is a benign rhythm commonly seen in reperfusion states (usually post PCI or post thrombectomy in this case). Usually resolved with time   -Yesterday pm on tele, noted to have 23 beat WCT - A tach with aberrancy, trop negative   -ECHO with normal LVEF, LAE  -Pt asymptomatic from cardiac perspective and stable BP  -Increase  metoprolol tartrate 25 mg bid, cont statin  -ASA, AC (eliquis 5 mg bid) when cleared by neuro team, in the interim pt on IV heparin
Initial attending contact date7/18/22      . See fellow note written above for details. I reviewed the fellow documentation. I have personally seen and examined this patient. I reviewed vitals, labs, medications, cardiac studies, and additional imaging. I agree with the above fellow's findings and plans as written above with the following additions/statements.    76y Male with PMHx of Afib s/p ablation in 2021 (on Xarelto), CAD (mid LAD, mid RCA, OM1, OM2, OM3 stents 7763-4089), HTN, HLD, GERD, and COVID infection 7/8/22 (tx w/ Paxlovid) admitted with L MCA CVA, now s/p thrombectomy. MRI and CT head with evolving MCA ischemia/hemorrahge. Cardiology was consulted 2/2 AIVR of 11 seconds seen on telemetry.   -Tele reviewed: predominantly NSR with few PVCS,  11 sec of accelerated idioventricular rhythm noted   -AIVR is a benign rhythm commonly seen in reperfusion states (usually post PCI or post thrombectomy in this case). Usually resolved with time   -Yesterday pm on tele, noted to have 23 beat NSVT, trop negative   -ECHO with normal LVEF, LAE  -Pt asymptomatic from cardiac perspective and stable BP  -Cont  metoprolol tartrate 25 mg bid, can transition to toprol 50 mg qd. cont statin  -ASA, AC (eliquis 5 mg bid) when cleared by neuro team, in the interim pt on IV heparin  -To fu with his outpatient cardiologist upon dc   -Pls reconsult as needed
Agree with plan of care per fellow Mar    Vitals/ labs/ imaging/ medication reviewed  Agree with exam. Improving Ox3, expressive aphasia, mild RUE pronator.     75 y/o male with: L MCA stroke s/p mechanical cerebral thrombectomy of L distal M1 occlusion TICI 0 to 3 (7/12).  Afib (on xarelto)- taking a reduced dose while on Paxlovid  HTN  HLD  CAD  Recent COVID infection (diagnosed 7/8/22, on Paxlovid)      Plan:   CT head stable.  Q4 neurochecks  Will need full anticoagulation as high risk CHADSVASc 5.  MRI pending  Stroke neurology recs appreciated  -160. Resume metoprolol per home regimen. Needs medication reconciliation (PCP  [phone] Dr Mathews)  Hypotensive episode probably related to volume depletion and sedation (precedex). RESOLVED. Lactate wnl.   PT/OT/speech      ICU stepdown Checklist:    Completed: 07-13 @ 14:06    [X] hemodynamically stable – VS WNL and stable x 24hours, UO adequate  [n/a ] if  previously on HDA - off pressors x 24h with stable neuro exam    [X] no new symptoms x 24h (i.e. new fever, new-onset nausea/vomiting)  [X] stable labs: (i.e. WBC not rising, sodium not dropping)  [X] patient not at high risk for aspiration, if high risk then:                  [ ] should have definitive plans for trach/PEG (alternative option is to discharge from ICU to facilty)                  [ ] stepdown to bed close to nurse’s station  [n/a] low suctioning requirements (i.e. q4h or less)  [X] sign-off from primary RN*  [X] drains do not require ICU level of care  [X] if patient previously agitated or with behavioral issues – controlled   [X] pain controlled

## 2022-07-20 NOTE — PROGRESS NOTE ADULT - SUBJECTIVE AND OBJECTIVE BOX
INTERVAL EVENTS:    PAST MEDICAL & SURGICAL HISTORY:  Afib    HTN (hypertension)    GERD (gastroesophageal reflux disease)    HLD (hyperlipidemia)    CVD (cardiovascular disease)    S/P ablation of atrial fibrillation    Presence of stent in LAD coronary artery    S/P right coronary artery (RCA) stent placement    S/P arterial stent        MEDICATIONS  (STANDING):  atorvastatin 80 milliGRAM(s) Oral at bedtime  chlorhexidine 2% Cloths 1 Application(s) Topical <User Schedule>  famotidine    Tablet 40 milliGRAM(s) Oral daily  heparin  Infusion 800 Unit(s)/Hr (8 mL/Hr) IV Continuous <Continuous>  levETIRAcetam 250 milliGRAM(s) Oral every 12 hours  losartan 100 milliGRAM(s) Oral daily  metoprolol tartrate 25 milliGRAM(s) Oral every 12 hours  pantoprazole    Tablet 40 milliGRAM(s) Oral before breakfast  tamsulosin 0.4 milliGRAM(s) Oral at bedtime    MEDICATIONS  (PRN):  acetaminophen     Tablet .. 650 milliGRAM(s) Oral every 6 hours PRN Temp greater or equal to 38C (100.4F), Mild Pain (1 - 3)  ondansetron Injectable 4 milliGRAM(s) IV Push every 6 hours PRN Nausea and/or Vomiting  senna 2 Tablet(s) Oral at bedtime PRN Constipation    T(F): 96.5 (07-20-22 @ 03:52), Max: 98 (07-19-22 @ 21:30)  HR: 78 (07-20-22 @ 03:52) (72 - 100)  BP: 140/84 (07-20-22 @ 03:52) (135/69 - 167/85)  BP(mean): 107 (07-20-22 @ 03:52) (94 - 117)  ABP: --  ABP(mean): --  RR: 17 (07-20-22 @ 03:52) (16 - 18)  SpO2: 97% (07-20-22 @ 03:52) (93% - 97%)    I/O Detail 24H    19 Jul 2022 07:01  -  20 Jul 2022 07:00  --------------------------------------------------------  IN:    Heparin: 80 mL    Heparin: 45 mL    Heparin: 47 mL  Total IN: 172 mL    OUT:    Indwelling Catheter - Urethral (mL): 1850 mL  Total OUT: 1850 mL    Total NET: -1678 mL          PHYSICAL EXAM:  GEN: NAD  HEENT: EOMI   RESP: CTA b/l  CV: RRR. Normal S1/S2. No m/r/g.  ABD: soft, non-distended  EXT: No edema   NEURO: alert and attentive    LABS:  CBC 07-19-22 @ 09:59                        14.1   7.63  )-----------( 316                   41.4       Hgb trend: 14.1 <-- , 14.0 <--   WBC trend: 7.63 <-- , 8.19 <--       CMP 07-19-22 @ 09:59    137  |  101  |  16  ----------------------------<  153<H>  4.0   |  23  |  0.96    Ca    8.8      07-19-22 @ 09:59  Phos  3.2     07-18  Mg     1.9     07-19        Serum Cr trend: 0.96 <-- , 0.83 <-- , 0.94 <--     PT/INR - ( 18 Jul 2022 15:31 )   PT: 12.0 sec;   INR: 1.01          PTT - ( 20 Jul 2022 05:48 ):80.4 sec    Cardiac Markers           STUDIES:           INTERVAL EVENTS: no acute events    PAST MEDICAL & SURGICAL HISTORY:  Afib    HTN (hypertension)    GERD (gastroesophageal reflux disease)    HLD (hyperlipidemia)    CVD (cardiovascular disease)    S/P ablation of atrial fibrillation    Presence of stent in LAD coronary artery    S/P right coronary artery (RCA) stent placement    S/P arterial stent        MEDICATIONS  (STANDING):  atorvastatin 80 milliGRAM(s) Oral at bedtime  chlorhexidine 2% Cloths 1 Application(s) Topical <User Schedule>  famotidine    Tablet 40 milliGRAM(s) Oral daily  heparin  Infusion 800 Unit(s)/Hr (8 mL/Hr) IV Continuous <Continuous>  levETIRAcetam 250 milliGRAM(s) Oral every 12 hours  losartan 100 milliGRAM(s) Oral daily  metoprolol tartrate 25 milliGRAM(s) Oral every 12 hours  pantoprazole    Tablet 40 milliGRAM(s) Oral before breakfast  tamsulosin 0.4 milliGRAM(s) Oral at bedtime    MEDICATIONS  (PRN):  acetaminophen     Tablet .. 650 milliGRAM(s) Oral every 6 hours PRN Temp greater or equal to 38C (100.4F), Mild Pain (1 - 3)  ondansetron Injectable 4 milliGRAM(s) IV Push every 6 hours PRN Nausea and/or Vomiting  senna 2 Tablet(s) Oral at bedtime PRN Constipation    T(F): 96.5 (07-20-22 @ 03:52), Max: 98 (07-19-22 @ 21:30)  HR: 78 (07-20-22 @ 03:52) (72 - 100)  BP: 140/84 (07-20-22 @ 03:52) (135/69 - 167/85)  BP(mean): 107 (07-20-22 @ 03:52) (94 - 117)  ABP: --  ABP(mean): --  RR: 17 (07-20-22 @ 03:52) (16 - 18)  SpO2: 97% (07-20-22 @ 03:52) (93% - 97%)    I/O Detail [facility]    19 Jul 2022 07:01  -  20 Jul 2022 07:00  --------------------------------------------------------  IN:    Heparin: 80 mL    Heparin: 45 mL    Heparin: 47 mL  Total IN: 172 mL    OUT:    Indwelling Catheter - Urethral (mL): 1850 mL  Total OUT: 1850 mL    Total NET: -1678 mL          PHYSICAL EXAM:  GEN: NAD  HEENT: EOMI   RESP: CTA b/l  CV: RRR. Normal S1/S2. No m/r/g.  ABD: soft, non-distended  EXT: No edema   NEURO: alert and attentive    LABS:  CBC 07-19-22 @ 09:59                        14.1   7.63  )-----------( 316                   41.4       Hgb trend: 14.1 <-- , 14.0 <--   WBC trend: 7.63 <-- , 8.19 <--       CMP 07-19-22 @ 09:59    137  |  101  |  16  ----------------------------<  153<H>  4.0   |  23  |  0.96    Ca    8.8      07-19-22 @ 09:59  Phos  3.2     07-18  Mg     1.9     07-19        Serum Cr trend: 0.96 <-- , 0.83 <-- , 0.94 <--     PT/INR - ( 18 Jul 2022 15:31 )   PT: 12.0 sec;   INR: 1.01          PTT - ( 20 Jul 2022 05:48 ):80.4 sec    Cardiac Markers           STUDIES:

## 2022-07-20 NOTE — PROGRESS NOTE ADULT - SUBJECTIVE AND OBJECTIVE BOX
Neurology Stroke Progress Note    INTERVAL HPI/OVERNIGHT EVENTS:  Patient seen and examined.  number ______ used.    MEDICATIONS  (STANDING):  atorvastatin 80 milliGRAM(s) Oral at bedtime  chlorhexidine 2% Cloths 1 Application(s) Topical <User Schedule>  famotidine    Tablet 40 milliGRAM(s) Oral daily  heparin  Infusion 800 Unit(s)/Hr (8 mL/Hr) IV Continuous <Continuous>  levETIRAcetam 250 milliGRAM(s) Oral every 12 hours  losartan 100 milliGRAM(s) Oral daily  metoprolol tartrate 25 milliGRAM(s) Oral every 12 hours  pantoprazole    Tablet 40 milliGRAM(s) Oral before breakfast  tamsulosin 0.4 milliGRAM(s) Oral at bedtime    MEDICATIONS  (PRN):  acetaminophen     Tablet .. 650 milliGRAM(s) Oral every 6 hours PRN Temp greater or equal to 38C (100.4F), Mild Pain (1 - 3)  ondansetron Injectable 4 milliGRAM(s) IV Push every 6 hours PRN Nausea and/or Vomiting  senna 2 Tablet(s) Oral at bedtime PRN Constipation    Allergies    penicillin (Unknown)    Intolerances      Vital Signs Last 24 Hrs  T(C): 35.8 (20 Jul 2022 03:52), Max: 36.7 (19 Jul 2022 21:30)  T(F): 96.5 (20 Jul 2022 03:52), Max: 98 (19 Jul 2022 21:30)  HR: 78 (20 Jul 2022 03:52) (72 - 100)  BP: 140/84 (20 Jul 2022 03:52) (135/69 - 167/85)  BP(mean): 107 (20 Jul 2022 03:52) (94 - 117)  RR: 17 (20 Jul 2022 03:52) (16 - 18)  SpO2: 97% (20 Jul 2022 03:52) (93% - 97%)    Parameters below as of 20 Jul 2022 03:52  Patient On (Oxygen Delivery Method): room air        Physical exam:  General: No acute distress, awake and alert  Eyes: Anicteric sclerae, moist conjunctivae, see below for CNs  Neck: trachea midline, FROM, supple, no thyromegaly or lymphadenopathy  Cardiovascular: Regular rate and rhythm, no murmurs, rubs, or gallops. No carotid bruits.   Pulmonary: Anterior breath sounds clear bilaterally, no crackles or wheezing. No use of accessory muscles  GI: Abdomen soft, non-distended, non-tender  Extremities: Radial and DP pulses +2, no edema    Neurologic:  -Mental status: Awake, alert, oriented to person, place, and time. Speech is fluent with intact naming, repetition, and comprehension, no dysarthria. Recent and remote memory intact. Follows commands. Attention/concentration intact. Fund of knowledge appropriate.  -Cranial nerves:   II: Visual fields are full to confrontation.  III, IV, VI: Extraocular movements are intact without nystagmus. Pupils equally round and reactive to light  V:  Facial sensation V1-V3 equal and intact   VII: Face is symmetric with normal eye closure and smile  VIII: Hearing is bilaterally intact to finger rub  IX, X: Uvula is midline and soft palate rises symmetrically  XI: Head turning and shoulder shrug are intact.  XII: Tongue protrudes midline  Motor: Normal bulk and tone. No pronator drift. Strength bilateral upper extremity 5/5, bilateral lower extremities 5/5.  Rapid alternating movements intact and symmetric  Sensation: Intact to light touch bilaterally. No neglect or extinction on double simultaneous testing.  Coordination: No dysmetria on finger-to-nose and heel-to-shin bilaterally  Reflexes: Downgoing toes bilaterally   Gait: Narrow gait and steady    LABS:                        14.1   7.63  )-----------( 316      ( 19 Jul 2022 09:59 )             41.4     07-19    137  |  101  |  16  ----------------------------<  153<H>  4.0   |  23  |  0.96    Ca    8.8      19 Jul 2022 09:59  Phos  3.2     07-18  Mg     1.9     07-19      PT/INR - ( 18 Jul 2022 15:31 )   PT: 12.0 sec;   INR: 1.01          PTT - ( 20 Jul 2022 05:48 )  PTT:80.4 sec      RADIOLOGY & ADDITIONAL TESTS:     Neurology Stroke Progress Note    INTERVAL HPI/OVERNIGHT EVENTS:  Patient seen and examined.  number ______ used.    MEDICATIONS  (STANDING):  atorvastatin 80 milliGRAM(s) Oral at bedtime  chlorhexidine 2% Cloths 1 Application(s) Topical <User Schedule>  famotidine    Tablet 40 milliGRAM(s) Oral daily  heparin  Infusion 800 Unit(s)/Hr (8 mL/Hr) IV Continuous <Continuous>  levETIRAcetam 250 milliGRAM(s) Oral every 12 hours  losartan 100 milliGRAM(s) Oral daily  metoprolol tartrate 25 milliGRAM(s) Oral every 12 hours  pantoprazole    Tablet 40 milliGRAM(s) Oral before breakfast  tamsulosin 0.4 milliGRAM(s) Oral at bedtime    MEDICATIONS  (PRN):  acetaminophen     Tablet .. 650 milliGRAM(s) Oral every 6 hours PRN Temp greater or equal to 38C (100.4F), Mild Pain (1 - 3)  ondansetron Injectable 4 milliGRAM(s) IV Push every 6 hours PRN Nausea and/or Vomiting  senna 2 Tablet(s) Oral at bedtime PRN Constipation    Allergies    penicillin (Unknown)    Intolerances      Vital Signs Last 24 Hrs  T(C): 35.8 (20 Jul 2022 03:52), Max: 36.7 (19 Jul 2022 21:30)  T(F): 96.5 (20 Jul 2022 03:52), Max: 98 (19 Jul 2022 21:30)  HR: 78 (20 Jul 2022 03:52) (72 - 100)  BP: 140/84 (20 Jul 2022 03:52) (135/69 - 167/85)  BP(mean): 107 (20 Jul 2022 03:52) (94 - 117)  RR: 17 (20 Jul 2022 03:52) (16 - 18)  SpO2: 97% (20 Jul 2022 03:52) (93% - 97%)    Parameters below as of 20 Jul 2022 03:52  Patient On (Oxygen Delivery Method): room air        Physical exam:  General: No acute distress, awake and alert  Eyes: Anicteric sclerae, moist conjunctivae, see below for CNs  Neck: trachea midline, supple  Cardiovascular: Regular rate and rhythm, no murmurs, rubs, or gallops. No carotid bruits.   Pulmonary: Anterior breath sounds clear bilaterally, no crackles or wheezing. No use of accessory muscles  GI: Abdomen soft, non-distended, non-tender  Extremities: no edema    Neurologic:  -Mental status: Awake, alert, oriented to person, place, and time. expressive aphasia, language is fragmented, comprehension and attention intact, no dysarthria  -Cranial nerves:   II: Visual fields are full to confrontation.  III, IV, VI: Extraocular movements are intact without nystagmus. Pupils equally round and reactive to light  V:  Facial sensation V1-V3 equal and intact   VII: Face is symmetric with normal eye closure and smile  VIII: Hearing is bilaterally intact to finger rub  IX, X: Uvula is midline and soft palate rises symmetrically  XI: Head turning and shoulder shrug are intact.  XII: Tongue protrudes midline  Motor: Normal bulk and tone. No pronator drift. Strength bilateral upper extremity 5/5, bilateral lower extremities 5/5.  Sensation: Intact to light touch bilaterally. No neglect or extinction on double simultaneous testing.  Coordination: BL dysmetria on finger-to-nose       LABS:                        14.1   7.63  )-----------( 316      ( 19 Jul 2022 09:59 )             41.4     07-19    137  |  101  |  16  ----------------------------<  153<H>  4.0   |  23  |  0.96    Ca    8.8      19 Jul 2022 09:59  Phos  3.2     07-18  Mg     1.9     07-19      PT/INR - ( 18 Jul 2022 15:31 )   PT: 12.0 sec;   INR: 1.01          PTT - ( 20 Jul 2022 05:48 )  PTT:80.4 sec      RADIOLOGY & ADDITIONAL TESTS:       Neurology Stroke Progress Note    INTERVAL HPI/OVERNIGHT EVENTS:  Patient seen and examined.  No acute events overnight. Patient denies any complaints. Just wants saleh removed.    MEDICATIONS  (STANDING):  atorvastatin 80 milliGRAM(s) Oral at bedtime  chlorhexidine 2% Cloths 1 Application(s) Topical <User Schedule>  famotidine    Tablet 40 milliGRAM(s) Oral daily  heparin  Infusion 800 Unit(s)/Hr (8 mL/Hr) IV Continuous <Continuous>  levETIRAcetam 250 milliGRAM(s) Oral every 12 hours  losartan 100 milliGRAM(s) Oral daily  metoprolol tartrate 25 milliGRAM(s) Oral every 12 hours  pantoprazole    Tablet 40 milliGRAM(s) Oral before breakfast  tamsulosin 0.4 milliGRAM(s) Oral at bedtime    MEDICATIONS  (PRN):  acetaminophen     Tablet .. 650 milliGRAM(s) Oral every 6 hours PRN Temp greater or equal to 38C (100.4F), Mild Pain (1 - 3)  ondansetron Injectable 4 milliGRAM(s) IV Push every 6 hours PRN Nausea and/or Vomiting  senna 2 Tablet(s) Oral at bedtime PRN Constipation    Allergies    penicillin (Unknown)    Intolerances      Vital Signs Last 24 Hrs  T(C): 35.8 (20 Jul 2022 03:52), Max: 36.7 (19 Jul 2022 21:30)  T(F): 96.5 (20 Jul 2022 03:52), Max: 98 (19 Jul 2022 21:30)  HR: 78 (20 Jul 2022 03:52) (72 - 100)  BP: 140/84 (20 Jul 2022 03:52) (135/69 - 167/85)  BP(mean): 107 (20 Jul 2022 03:52) (94 - 117)  RR: 17 (20 Jul 2022 03:52) (16 - 18)  SpO2: 97% (20 Jul 2022 03:52) (93% - 97%)    Parameters below as of 20 Jul 2022 03:52  Patient On (Oxygen Delivery Method): room air        Physical exam:  General: No acute distress, awake and alert  Eyes: Anicteric sclerae, moist conjunctivae, see below for CNs  Neck: trachea midline, supple  Cardiovascular: Regular rate and rhythm, no murmurs, rubs, or gallops. No carotid bruits.   Pulmonary: Anterior breath sounds clear bilaterally, no crackles or wheezing. No use of accessory muscles  GI: Abdomen soft, non-distended, non-tender  Extremities: no edema    Neurologic:  -Mental status: Awake, alert, oriented to person, place, and time. expressive aphasia, language is fragmented, comprehension and attention intact, no dysarthria  -Cranial nerves:   II: Visual fields are full to confrontation.  III, IV, VI: Extraocular movements are intact without nystagmus. Pupils equally round and reactive to light  V:  Facial sensation V1-V3 equal and intact   VII: Face is symmetric with normal eye closure and smile  VIII: Hearing is bilaterally intact to finger rub  IX, X: Uvula is midline and soft palate rises symmetrically  XI: Head turning and shoulder shrug are intact.  XII: Tongue protrudes midline  Motor: Normal bulk and tone. No pronator drift. Strength bilateral upper extremity 5/5, bilateral lower extremities 5/5.  Sensation: Intact to light touch bilaterally. No neglect or extinction on double simultaneous testing.  Coordination: BL dysmetria on finger-to-nose       LABS:                        14.1   7.63  )-----------( 316      ( 19 Jul 2022 09:59 )             41.4     07-19    137  |  101  |  16  ----------------------------<  153<H>  4.0   |  23  |  0.96    Ca    8.8      19 Jul 2022 09:59  Phos  3.2     07-18  Mg     1.9     07-19      PT/INR - ( 18 Jul 2022 15:31 )   PT: 12.0 sec;   INR: 1.01          PTT - ( 20 Jul 2022 05:48 )  PTT:80.4 sec      RADIOLOGY & ADDITIONAL TESTS:

## 2022-07-20 NOTE — PROGRESS NOTE ADULT - SUBJECTIVE AND OBJECTIVE BOX
still w word finding difficulty and dysarthria  denies new onset weakness or slurred speech    Physical Examination:  General: elderly male, in NAD  CV: S1, S2, no murmurs  Lungs: CTABL, no wheezing  Abd: soft, NT/ND, +BS. Rodriguez catheter in place  Ext: No edema  Neuro: AAOx3, dysarthria noted. Strength grossly intact. Sensation intact.

## 2022-07-20 NOTE — CHART NOTE - NSCHARTNOTEFT_GEN_A_CORE
Admitting Diagnosis:   Patient is a 76y old  Male who presents with a chief complaint of Left MCA stroke (20 Jul 2022 15:43)      PAST MEDICAL & SURGICAL HISTORY:  Afib      HTN (hypertension)      GERD (gastroesophageal reflux disease)      HLD (hyperlipidemia)      CVD (cardiovascular disease)      S/P ablation of atrial fibrillation      Presence of stent in LAD coronary artery      S/P right coronary artery (RCA) stent placement      S/P arterial stent          Current Nutrition Order: Regular Diet    PO Intake: Good (%) [ X ]  Fair (50-75%) [   ] Poor (<25%) [   ]    GI Issues: No N/V/D/C, BMs q1-2 days (bowel reg in place)    Pain: No pain reported    Skin Integrity: Hilario 20, Surgical incision- R groin, no pressure ulcers    Labs:   07-20    138  |  103  |  17  ----------------------------<  100<H>  4.3   |  23  |  0.90    Ca    9.6      20 Jul 2022 15:23  Phos  3.2     07-18  Mg     2.0     07-20    TPro  7.0  /  Alb  3.9  /  TBili  0.5  /  DBili  x   /  AST  65<H>  /  ALT  75<H>  /  AlkPhos  109  07-20    CAPILLARY BLOOD GLUCOSE          Medications:  MEDICATIONS  (STANDING):  apixaban 5 milliGRAM(s) Oral every 12 hours  atorvastatin 80 milliGRAM(s) Oral at bedtime  chlorhexidine 2% Cloths 1 Application(s) Topical <User Schedule>  famotidine    Tablet 40 milliGRAM(s) Oral daily  levETIRAcetam 250 milliGRAM(s) Oral every 12 hours  losartan 100 milliGRAM(s) Oral daily  pantoprazole    Tablet 40 milliGRAM(s) Oral before breakfast  tamsulosin 0.4 milliGRAM(s) Oral at bedtime    MEDICATIONS  (PRN):  acetaminophen     Tablet .. 650 milliGRAM(s) Oral every 6 hours PRN Temp greater or equal to 38C (100.4F), Mild Pain (1 - 3)  ondansetron Injectable 4 milliGRAM(s) IV Push every 6 hours PRN Nausea and/or Vomiting  senna 2 Tablet(s) Oral at bedtime PRN Constipation    Admission Anthropometrics:  Height for BMI (CENTIMETERS)	172.7 Centimeter(s)  Weight for BMI (lbs)	170.2 lb  Weight for BMI (kg)	77.2 kg  Body Mass Index	25.8    Weight Change: No new weights recorded    Estimated energy needs:   Calorie needs: 25-30kcal/kg (1930-2316kcal)  Protein needs: 1.2-1.3gm/kg (93-100gm)  Fluid needs: 25-30ml/kg (1930-2316ml)  IBW 70kg (110% IBW); estimated needs based on ABW (% IBW); calorie/protein needs adjusted for increased needs for covid, s/p stroke    Subjective:   76M with Afib, CVD (mid LAD, mid RCA, OM1, OM2, OM3 stents 3894-9403), HTN, HLD, GERD, and COVID infection 7/8/22 (tx w/ Paxlovid) presents to ED by EMS, found to be a thrombectomy case with L gaze, aphasia, R sided plegia. CTH showed hyperdense L MCA sign, confirmed on CTA. CTP showed significant mismatch. Patient was not a tpa candidate. S/p thrombectomy. MRI confirmed SAH and acute left MCA ischemia. Intermittent bradycardia, cardiology following. Asymptomatic covid-19.    Pt assessed at bedside. Continues on regular diet. Reports fair-good PO intake eating ~75% meals. Tolerating diet well. Reviewed adequate intake parameters, diet preferences. Pending dc to [place] 7/23. RD to follow, nutrition recommendations below.    Previous Nutrition Diagnosis:  Increased Nutrient Needs R/T s/p stroke AEB increased metabolic demand for calories/protein    Active [ X ]  Resolved [   ]    Goal: Consistently meet >75% EER    Recommendations:  -Continue regular diet  -Follow PO intake closely, consider supplements as needed  -Pain and bowel regimen per team  -Follow up nutrition education    Education: Reviewed adequate intake    Risk Level: High [   ] Moderate [ X ] Low [   ]
Documentation of MONTSERRAT as emergency procedure    ACP Template : Alteplase/ MONTSERRAT emergency procedure   Case discussed with Dr. Lunsford prior to urgent intervention. Risk and benefits of alteplase were discussed with patient/family member including risk of symptomatic ICH/death up to 6.4%. Decision was made that risks outweigh the benefits and alteplase was not administered (on active anticoagulation).     Thrombectomy was discussed between Neurology and Neuro IR Attendings and decision was made to proceed with thrombectomy. Mercedes Monge, patient's spouse, consented for emergency thrombectomy after discussion of risk and benefits of procedure.
Around 19:00 patient became hypotensive BP 78/46. Patient had received 10mg IV hydralazine prior at 17:00 and 2.5 IV lopressor and started on precedex gtt at 18:00. Precedex gtt stopped and patient remained lethargic and unarousable. On exam, PERRL, did not open eyes, did not follow commands, moving left arm spontaneously and withdrawing in all other extremities, distal pulses 2+, abdomen soft, and groin puncture site with no hematoma. Patient received 2L bolus NS, placed in trendelenberg and levophed gtt started. EICU called and SICU team at bedside performed POCUS. Cardiac function on US grossly WNL and no B-lines visualized. EKG completed with LBBB. Labs sent. Troponin negative. Lactate normal. Hb 13 from 15.9. Patient was taken for urgent CT head without contrast which showed area of hyperdensity in left hemisphere likely from contrast extravasation. Upon return to ICU, patient with improved exam. Opening eyes spontaneously, mixed aphasia, oriented to self, moving all extremities antigravity, and R upper extremity pronator drift. Plan to repeat CT head in the morning.
Neurosurgical Indications for Screening Dopplers on Admission:       1) Known hypercoagulation disorder (h/o VTE, thrombophilia, HIT, etc.)   2) Admitted from prolonged stay from another institution (straight forward ER transfers not included)  3) Presenting with significant leg immobility   4) Presenting with signs and symptoms of VTE?    5) With significant critical illness, Including "found down" for unknown period of time in HPI  6) With significant neurotrauma (TBI, SCI / TLS spine fractures)   7) Who are comatose   8) With known malignancy (e.g. glioblastoma multiforme, meningioma, etc.). Excludes IA chemo 23hr observation stays  9) On hemodialysis   10) Who have received platelet transfusion or antithrombotic reversal agents recently   11) Who have had recent major orthopedic surgery          Screening dopplers indicated?   Y x   N _    DVT Prophylaxis:  _ SCD's   _ chemoprophylaxis

## 2022-07-20 NOTE — PROGRESS NOTE ADULT - ASSESSMENT
76M with Afib (on Xarelto), CVD (mid LAD, mid RCA, OM1, OM2, OM3 stents 5922-0497), HTN, HLD, GERD, and COVID infection 7/8/22 (tx w/ Paxlovid) presents to ED by EMS, found to be a thrombectomy case with L gaze, aphasia, R sided plegia. CTH showed hyperdense L MCA sign, confirmed on CTA. CTP showed significant mismatch. Patient was not a tpa candidate as he had likely taken Xeralto within 3 hours prior to arrival. S/p thrombectomy. MRI confirmed SAH and acute left MCA ischemia. Noted to have episodes of accelerated ventricular rhythm with episodes of sinus bradycardia on tele. However, when asked he reports being asymptomatic. EKG shows NSR with T-wave inversions, unchanged from prior. Cardiology consulted.    #Acute left MCA CVA with SAH, with continued evolution  - reviewed repeat CTH today 7/19 with stable SAH; heparin drip was restarted 7/18 evening  - plan to switch AC to eliquis upon discharge  - continue atorvastatin 80mg daily   - CTA: Occlusion of the midportion of the left M1 segment to the proximal M2 segment and high-grade narrowing involving the origin of the left anterior temporal artery. Focal area of mild to moderate narrowing involving the origin of the right vertebral artery. No carotid stenosis.  - Physical therapy/Occupational therapy, acute rehab  - SLP    #Hypertension  - Remains with elevated BP readings  - Lopressor 25mg BID  - Increase losartan to 100mg daily   - TTE: Limited study, no PFO EF 65%    #Bradycardia, intermittent; NSVT  - cardiology following  - given hx of CAD and PCI hx, in addition to being asymptomatic, defer further workup    #Atrial fibrillation  - heparin drip  - lopressor 25mg BID  - Cardiology consulted, recommendations appreciated    #Hyperlipidemia   - continue atorvastatin 80mg daily     #COVID-19, asymptomatic  - discussed with infection control; repeated COVID19 PCR today (day 7) and positive  - discontinue isolation on 7/22 (10 days after pos COVID19 PCR)    #Urinary retention  - saleh in place, TOV today.  - start tamsulosin 0.4mg qhs

## 2022-07-20 NOTE — CHART NOTE - NSCHARTNOTESELECT_GEN_ALL_CORE
VTE/Event Note
change in exam and hypotension/Event Note
Emergency Procedure/Event Note
Follow Up/Nutrition Services

## 2022-07-20 NOTE — ADVANCED PRACTICE NURSE CONSULT - ASSESSMENT
Discussed with NAT Knutson and Neuro team that patient does not have any wounds and it is unclear why consult was placed. Removing consult.

## 2022-07-20 NOTE — PROGRESS NOTE ADULT - ASSESSMENT
76y Male with PMHx of Afib s/p ablation in 2021 (on Xarelto), CVD (mid LAD, mid RCA, OM1, OM2, OM3 stents 5652-3990), HTN, HLD, GERD, and COVID infection 7/8/22 (tx w/ Paxlovid) presents to ED by EMS, found to be a thrombectomy case with L gaze, aphasia, R sided plegia. CTH showed hyperdense L MCA sign, confirmed on CTA. CTP showed significant mismatch. Patient was not a tpa candidate as he had likely taken Xeralto within 3 hours prior to arrival. S/p thrombectomy. MRI confirmed SAH and acute left MCA ischemia. Cardiology was consulted 2/2 AIVR of 11 seconds seen on telemetry.     #AIVR  EKG: NSR, diffused t wave inversions present on admission ekg as well  Tele: AIVR vs NSTV at 100-120 bpm run of 11 seconds, PVCs, NSR, occasionally sinus bradycardia  Patient reported he was asymptomatic during the event, but was aware of it since he noted it on his apple watch.   Patient denies CP, SOB, or any exertional sxs, said he's ET was very good prior to the stroke.  Follows up with a cardiologist at [facility].  Please obtain records from patient's cardiologist regarding prior ekgs, ischemic work up etc.   ECHO limited: normal LV and RV function, mildly dilated LA, no effusion   Recommendations:  - Benign rhythm, reflects reperfusion state, should resolve over time      #Atrial tachycardia with aberrancy   Patient predominantly NSR on tele with PVC, had episode of AT w aberrancy (14 seconds 7/18). History of CAD (mid LAD, mid RCA, OM1, OM2, OM3 stents 8300-3791).   Recommendations:  - Continue lopressor 25mg BID (uptitrated from 12.5 BID)  - Keep K>4, Mag>2    #CAD  Hx of CAD (mid LAD, mid RCA, OM1, OM2, OM3 stents 0612-1655)  - Please restart home ASA daily if okay from stroke standpoint      To be d/w attending 76y Male with PMHx of Afib s/p ablation in 2021 (on Xarelto), CVD (mid LAD, mid RCA, OM1, OM2, OM3 stents 5793-4388), HTN, HLD, GERD, and COVID infection 7/8/22 (tx w/ Paxlovid) presents to ED by EMS, found to be a thrombectomy case with L gaze, aphasia, R sided plegia. CTH showed hyperdense L MCA sign, confirmed on CTA. CTP showed significant mismatch. Patient was not a tpa candidate as he had likely taken Xeralto within 3 hours prior to arrival. S/p thrombectomy. MRI confirmed SAH and acute left MCA ischemia. Cardiology was consulted 2/2 AIVR of 11 seconds seen on telemetry.     #AIVR  EKG: NSR, diffused t wave inversions present on admission ekg as well  Tele: AIVR vs NSTV at 100-120 bpm run of 11 seconds, PVCs, NSR, occasionally sinus bradycardia  Patient reported he was asymptomatic during the event, but was aware of it since he noted it on his apple watch.   Patient denies CP, SOB, or any exertional sxs, said he's ET was very good prior to the stroke.  Follows up with a cardiologist at [facility].  Please obtain records from patient's cardiologist regarding prior ekgs, ischemic work up etc.   ECHO limited: normal LV and RV function, mildly dilated LA, no effusion   Recommendations:  - Benign rhythm, reflects reperfusion state, should resolve over time      #NSVT  Patient predominantly NSR on tele with PVC, had episode of NSVT (14 seconds 7/18). History of CAD (mid LAD, mid RCA, OM1, OM2, OM3 stents 7043-4948).   Recommendations:  - Continue lopressor 25mg BID (uptitrated from 12.5 BID)  - Keep K>4, Mag>2    #CAD  Hx of CAD (mid LAD, mid RCA, OM1, OM2, OM3 stents 2173-5677)  - Please restart home ASA daily if okay from stroke standpoint      To be d/w attending 76y Male with PMHx of Afib s/p ablation in 2021 (on Xarelto), CVD (mid LAD, mid RCA, OM1, OM2, OM3 stents 5289-9173), HTN, HLD, GERD, and COVID infection 7/8/22 (tx w/ Paxlovid) presents to ED by EMS, found to be a thrombectomy case with L gaze, aphasia, R sided plegia. CTH showed hyperdense L MCA sign, confirmed on CTA. CTP showed significant mismatch. Patient was not a tpa candidate as he had likely taken Xeralto within 3 hours prior to arrival. S/p thrombectomy. MRI confirmed SAH and acute left MCA ischemia. Cardiology was consulted 2/2 AIVR of 11 seconds seen on telemetry.     #AIVR  EKG: NSR, diffused t wave inversions present on admission ekg as well  Tele: AIVR vs NSTV at 100-120 bpm run of 11 seconds, PVCs, NSR, occasionally sinus bradycardia  Patient reported he was asymptomatic during the event, but was aware of it since he noted it on his apple watch.   Patient denies CP, SOB, or any exertional sxs, said he's ET was very good prior to the stroke.  Follows up with a cardiologist at [facility].  Please obtain records from patient's cardiologist regarding prior ekgs, ischemic work up etc.   ECHO limited: normal LV and RV function, mildly dilated LA, no effusion   Recommendations:  - Benign rhythm, reflects reperfusion state, should resolve over time      #NSVT  Patient predominantly NSR on tele with PVC, had episode of NSVT (14 seconds 7/18). History of CAD (mid LAD, mid RCA, OM1, OM2, OM3 stents 5391-0869).   Recommendations:  - Transition metoprolol tartrate 25mg BID to metoprolol succinate 50mg daily  - Keep K>4, Mag>2    #CAD  Hx of CAD (mid LAD, mid RCA, OM1, OM2, OM3 stents 2785-3742)  - Please restart home ASA daily if okay from stroke standpoint      Case d/w attending  Please reconsult PRN

## 2022-07-20 NOTE — PROGRESS NOTE ADULT - TIME BILLING
review of patient information including recent vital signs, labs, imaging, and notes; assessing, examining patient; updating patient/family; discussion and coordination of care with multidisciplinary team.
as noted above
as noted above
review of patient information including recent vital signs, labs, imaging, and notes; assessing, examining patient; updating patient/family; discussion and coordination of care with multidisciplinary team.
review of patient information including recent vital signs, labs, imaging, and notes; assessing, examining patient; updating patient/family; discussion and coordination of care with multidisciplinary team.

## 2022-07-20 NOTE — PROGRESS NOTE ADULT - ASSESSMENT
76y Male with PMHx of Afib (on Xarelto), CVD (mid LAD, mid RCA, OM1, OM2, OM3 stents 2150-0045), HTN, HLD, GERD, and COVID infection 7/8/22 (tx w/ Paxlovid) presents to ED by EMS, found to be a thrombectomy case with L gaze, aphasia, R sided plegia. CTH showed hyperdense L MCA sign, confirmed on CTA. CTP showed significant mismatch. Patient was not a tpa candidate as he had likely taken Xeralto within 3 hours prior to arrival. S/p thrombectomy TICI 0 to 3. Of note, L ICA dissection (non-occlusive) was noted, likely iatrogenic. Stroke etiology more likely due to being off xarelto when patient was on paxlovid. CTH with SAH vs. contrast extravasation on L sylvian fissure. MRI confirmed SAH and multifocal areas of ischemia along left MCA territory.    Neuro  #CVA workup  - holding home xarelto for now, likely to switch to eliquis when cleared to start   - Repeat CTH overnight stable. c/w heparin gtt (goal 40-60) as patient has underlying afib. Will repeat another scan tmmr and if stable will transition to oral AC  - c/w keppra 250mg BID for sz ppx  - continue atorvastatin 80mg daily   - q4hr stroke neuro checks and vitals  - Stroke Code HCT Results: Hyperdense left MCA which is suspicious for thrombus and acute left MCA territory infarct.  - Stroke Code CTA Results: Occlusion of the midportion of the left M1 segment to the proximal M2 segment and high-grade narrowing involving the origin of the left anterior temporal artery. Focal area of mild to moderate narrowing involving the origin of the right vertebral artery. No carotid stenosis.  - repeat CTH with SAH vs. contrast extravasation on L sylvian fissure.  - CTH 7/15 with SAH present, continue to hold AC/antithrombotics.   - MRI confirms left SAH and multifocal areas of ischemia along left MCA territory  - PT/OT/SLP  - Stroke education    Cards  #HTN  - BP goal 120-160  - c/w losartan 100mg daily (home dose)  - Bradycardic down to the 30s 7/15. Asymptomatic  - ekg 7/15 with NSR with APC  - EKG 7/16 NSR  # Cardiology was consulted on 7/17 for NSVR of 11 seconds seen on telemetry. Patient remained asx.  - EKG 7/17: NSR, diffused t wave inversions present on admission ekg as well  - Tele: AIVR at 100-120 bpm run of 11 seconds, PVCs, NSR, occasionally sinus bradycardia  - echo:  Limited study obtained for evaluation of left ventricular function. Normal left and right ventricular size and systolic function. Mildly dilated left atrium. No pericardial effusion. The left ventricle is normal in size and systolic function with a calculated ejection fraction of 65%.  -beats of vtach tele this am and overnight   -c/w lopressor to 25mg BID  -Cards following  - EP consulted, f/u recs    - hospitalist Dr. Hernandez spoke to patient's PCP - TWI present as outpatient, no other ischemic workup after 2019, overall patent stents      #afib  -c/w heparin drip  - C/w BB, ok with switching succinate to tartrate as per cards.       #HLD  - high dose statin as above in CVA  - LDL results: 54    Pulm  - call provider if SPO2 < 94%    GI  #Nutrition/Fluids/Electrolytes   - replete K<4 and Mg <2  - Diet: regular  - cont home PPI    Renal  - daily BMP    Infectious Disease  COVID + 7/8, started on paxlovid OSH  - afebrile  - COVID+, isolation precautions  - CXR: Heart size within normal limits, thoracic aortic calcification. Lungs and mediastinum are unremarkable. Thoracic spine degenerative changes, dextroscoliosis. Elevation of the right hemidiaphragm.    Endocrine  - A1C results: 5.6  - TSH results: 0.975    Urology  #urinary retention  Voiding, but continues to retain requiring straight cath x3  - initially declines saleh, but eventually agreed if another straight cath needed - saleh in place  - c/w tamsulosin 0.4mg     DVT Prophylaxis  - SCDs   - LE dopplers negative    Dispo: acute rehab. Since his initial COVID test was an at home rapid, 10 day isolation period required for transfer to acute rehab began on 7/12 from positive in-house PCR test. Will need to remain in isolation until at least 7/22.     PT/OT: AR    Discussed with Neurology Attending Dr. Mondragon    Pending: EP eval, will repeat covid PCR, monitor PTT, plan for repeat CTH tmmr and transition to oral AC if CTH stable     This is a 76y Male with PMHx of Afib (on Xarelto), CVD (mid LAD, mid RCA, OM1, OM2, OM3 stents 0060-0091), HTN, HLD, GERD, and COVID infection 7/8/22 (tx w/ Paxlovid) who presented to ED by EMS, found down at home with aphasia, R sided plegia secondary to left MCA territory infarct from the occlusion of the midportion of the left M1 segment to the proximal M2 segment most likely cardioembolic in nature.     Neuro  #CVA workup  - holding home xarelto for now, likely to switch to eliquis when cleared to start   - c/w keppra 250mg BID for sz ppx  - continue atorvastatin 80mg daily   - q4hr stroke neuro checks and vitals  - Stroke Code HCT Results: Hyperdense left MCA which is suspicious for thrombus and acute left MCA territory infarct.  - Stroke Code CTA Results: Occlusion of the midportion of the left M1 segment to the proximal M2 segment and high-grade narrowing involving the origin of the left anterior temporal artery. Focal area of mild to moderate narrowing involving the origin of the right vertebral artery. No carotid stenosis.  - repeat CTH with SAH vs. contrast extravasation on L sylvian fissure.  - CTH 7/15 with SAH present, continue to hold AC/antithrombotics.   - MRI confirms left SAH and multifocal areas of ischemia along left MCA territory  - PT/OT/SLP  - Stroke education  - Plan for repeat CTH today, if stable then transition to oral AC    Cards  #HTN  - BP goal 120-160  - c/w losartan 100mg daily (home dose)  - Bradycardic down to the 30s 7/15. Asymptomatic  - ekg 7/15 with NSR with APC  - EKG 7/16 NSR  # Cardiology was consulted on 7/17 for NSVR of 11 seconds seen on telemetry. Patient remained asx.  - EKG 7/17: NSR, diffused t wave inversions present on admission ekg as well  - Tele: AIVR at 100-120 bpm run of 11 seconds, PVCs, NSR, occasionally sinus bradycardia  - echo:  Limited study obtained for evaluation of left ventricular function. Normal left and right ventricular size and systolic function. Mildly dilated left atrium. No pericardial effusion. The left ventricle is normal in size and systolic function with a calculated ejection fraction of 65%.  -Cards following  - will transition to metoprolol succ mg daily starting tmmr  - EP consulted recs appreciated    - hospitalist Dr. Hernandez spoke to patient's PCP - TWI present as outpatient, no other ischemic workup after 2019, overall patent stents      #afib  -c/w heparin drip  - C/w metoprolol, will switch to succ mg daily tmmr    #HLD  - high dose statin as above in CVA  - LDL results: 54    Pulm  - call provider if SPO2 < 94%    GI  #Nutrition/Fluids/Electrolytes   - replete K<4 and Mg <2  - Diet: regular  - cont home PPI    Renal  - daily BMP    Infectious Disease  COVID + 7/8, started on paxlovid OSH  - afebrile  - COVID+, isolation precautions  - CXR: Heart size within normal limits, thoracic aortic calcification. Lungs and mediastinum are unremarkable. Thoracic spine degenerative changes, dextroscoliosis. Elevation of the right hemidiaphragm.    Endocrine  - A1C results: 5.6  - TSH results: 0.975    Urology  #urinary retention  - perform TOV today  - c/w tamsulosin 0.4mg     DVT Prophylaxis  - SCDs   - LE dopplers negative    Dispo: acute rehab. Since his initial COVID test was an at home rapid, 10 day isolation period required for transfer to acute rehab began on 7/12 from positive in-house PCR test. Will need to remain in isolation until at least 7/22.     PT/OT: AR    Pending: repeat CTH today and transition to oral AC if CTH stable, dispo planning     This is a 76y Male with PMHx of Afib (on Xarelto), CVD (mid LAD, mid RCA, OM1, OM2, OM3 stents 0971-0033), HTN, HLD, GERD, and COVID infection 7/8/22 (tx w/ Paxlovid) who presented to ED by EMS, found down at home with aphasia, R sided plegia secondary to left MCA territory infarct from the occlusion of the midportion of the left M1 segment to the proximal M2 segment most likely cardioembolic in nature.     Neuro  #CVA workup  - holding home xarelto for now, likely to switch to eliquis when cleared to start   - c/w keppra 250mg BID for sz ppx  - continue atorvastatin 80mg daily   - q4hr stroke neuro checks and vitals  - Stroke Code HCT Results: Hyperdense left MCA which is suspicious for thrombus and acute left MCA territory infarct.  - Stroke Code CTA Results: Occlusion of the midportion of the left M1 segment to the proximal M2 segment and high-grade narrowing involving the origin of the left anterior temporal artery. Focal area of mild to moderate narrowing involving the origin of the right vertebral artery. No carotid stenosis.  - repeat CTH with SAH vs. contrast extravasation on L sylvian fissure.  - CTH 7/15 with SAH present, continue to hold AC/antithrombotics.   - MRI confirms left SAH and multifocal areas of ischemia along left MCA territory  - PT/OT/SLP  - Stroke education  - Plan for repeat CTH today, if stable then transition to oral AC    Cards  #HTN  - BP goal 120-160  - c/w losartan 100mg daily (home dose)  - Bradycardic down to the 30s 7/15. Asymptomatic  - ekg 7/15 with NSR with APC  - EKG 7/16 NSR  # Cardiology was consulted on 7/17 for NSVR of 11 seconds seen on telemetry. Patient remained asx.  - EKG 7/17: NSR, diffused t wave inversions present on admission ekg as well  - Tele: AIVR at 100-120 bpm run of 11 seconds, PVCs, NSR, occasionally sinus bradycardia  - echo:  Limited study obtained for evaluation of left ventricular function. Normal left and right ventricular size and systolic function. Mildly dilated left atrium. No pericardial effusion. The left ventricle is normal in size and systolic function with a calculated ejection fraction of 65%.  -Cards following  - will transition to metoprolol succ mg daily starting tmmr  - EP consulted recs appreciated    - hospitalist Dr. Hernandez spoke to patient's PCP - TWI present as outpatient, no other ischemic workup after 2019, overall patent stents      #afib  -c/w heparin drip  - C/w metoprolol, will switch to succ mg daily tmmr    #HLD  - high dose statin as above in CVA  - LDL results: 54    Pulm  - call provider if SPO2 < 94%    GI  #Nutrition/Fluids/Electrolytes   - replete K<4 and Mg <2  - Diet: regular  - cont home PPI    Renal  - daily BMP    Infectious Disease  COVID + 7/8, started on paxlovid OSH  - afebrile  - COVID+, isolation precautions  - CXR: Heart size within normal limits, thoracic aortic calcification. Lungs and mediastinum are unremarkable. Thoracic spine degenerative changes, dextroscoliosis. Elevation of the right hemidiaphragm.    Endocrine  - A1C results: 5.6  - TSH results: 0.975    Urology  #urinary retention  - perform TOV today  - c/w tamsulosin 0.4mg     DVT Prophylaxis  - SCDs   - LE dopplers negative    Dispo: acute rehab. Since his initial COVID test was an at home rapid, 10 day isolation period required for transfer to acute rehab began on 7/12 from positive in-house PCR test. Will need to remain in isolation until at least 7/22.     PT/OT: AR    Pending: repeat CTH today and transition to oral AC if CTH stable, TOV, dispo planning     This is a 76y Male with PMHx of Afib (on Xarelto), CVD (mid LAD, mid RCA, OM1, OM2, OM3 stents 9506-8310), HTN, HLD, GERD, and COVID infection 7/8/22 (tx w/ Paxlovid) who presented to ED by EMS, found down at home with aphasia, R sided plegia secondary to left MCA territory infarct from the occlusion of the midportion of the left M1 segment to the proximal M2 segment most likely cardioembolic in nature.     Neuro  #CVA workup  - holding home xarelto for now, likely to switch to eliquis when cleared to start   - c/w keppra 250mg BID for sz ppx  - continue atorvastatin 80mg daily   - q4hr stroke neuro checks and vitals  - Stroke Code HCT Results: Hyperdense left MCA which is suspicious for thrombus and acute left MCA territory infarct.  - Stroke Code CTA Results: Occlusion of the midportion of the left M1 segment to the proximal M2 segment and high-grade narrowing involving the origin of the left anterior temporal artery. Focal area of mild to moderate narrowing involving the origin of the right vertebral artery. No carotid stenosis.  - repeat CTH with SAH vs. contrast extravasation on L sylvian fissure.  - CTH 7/15 with SAH present, continue to hold AC/antithrombotics.   - MRI confirms left SAH and multifocal areas of ischemia along left MCA territory  - PT/OT/SLP  - Stroke education  - Plan for repeat CTH today, if stable then transition to oral AC    Cards  #HTN  - BP goal 120-160  - c/w losartan 100mg daily (home dose)  - Bradycardic down to the 30s 7/15. Asymptomatic  - ekg 7/15 with NSR with APC  - EKG 7/16 NSR  # Cardiology was consulted on 7/17 for NSVR of 11 seconds seen on telemetry. Patient remained asx.  - EKG 7/17: NSR, diffused t wave inversions present on admission ekg as well  - Tele: AIVR at 100-120 bpm run of 11 seconds, PVCs, NSR, occasionally sinus bradycardia  - echo:  Limited study obtained for evaluation of left ventricular function. Normal left and right ventricular size and systolic function. Mildly dilated left atrium. No pericardial effusion. The left ventricle is normal in size and systolic function with a calculated ejection fraction of 65%.  -Cards following  - will transition to metoprolol succ mg daily starting tmmr  - EP consulted recs appreciated    - hospitalist Dr. Hernandez spoke to patient's PCP - TWI present as outpatient, no other ischemic workup after 2019, overall patent stents      #afib  -c/w heparin drip  - C/w metoprolol, will switch to succ mg daily tmmr    #HLD  - high dose statin as above in CVA  - LDL results: 54    Pulm  - call provider if SPO2 < 94%    GI  #Nutrition/Fluids/Electrolytes   - replete K<4 and Mg <2  - Diet: regular  - cont home PPI    Renal  - daily BMP    Infectious Disease  COVID + 7/8, started on paxlovid OSH  - afebrile  - COVID+, isolation precautions  - CXR: Heart size within normal limits, thoracic aortic calcification. Lungs and mediastinum are unremarkable. Thoracic spine degenerative changes, dextroscoliosis. Elevation of the right hemidiaphragm.    Endocrine  - A1C results: 5.6  - TSH results: 0.975    Urology  #urinary retention  - perform TOV today  - c/w tamsulosin 0.4mg     DVT Prophylaxis  - SCDs   - LE dopplers negative    Dispo: acute rehab. Since his initial COVID test was an at home rapid, 10 day isolation period required for transfer to acute rehab began on 7/12 from positive in-house PCR test. Will need to remain in isolation until at least 7/22.     PT/OT: AR    Pending: repeat CTH today and transition to oral AC if CTH stable, TOV, dispo planning

## 2022-07-21 LAB
ALBUMIN SERPL ELPH-MCNC: 3 G/DL — LOW (ref 3.3–5)
ALP SERPL-CCNC: 90 U/L — SIGNIFICANT CHANGE UP (ref 40–120)
ALT FLD-CCNC: 60 U/L — HIGH (ref 10–45)
ANION GAP SERPL CALC-SCNC: 12 MMOL/L — SIGNIFICANT CHANGE UP (ref 5–17)
AST SERPL-CCNC: 59 U/L — HIGH (ref 10–40)
BILIRUB SERPL-MCNC: 0.6 MG/DL — SIGNIFICANT CHANGE UP (ref 0.2–1.2)
BUN SERPL-MCNC: 16 MG/DL — SIGNIFICANT CHANGE UP (ref 7–23)
CALCIUM SERPL-MCNC: 9.1 MG/DL — SIGNIFICANT CHANGE UP (ref 8.4–10.5)
CHLORIDE SERPL-SCNC: 108 MMOL/L — SIGNIFICANT CHANGE UP (ref 96–108)
CO2 SERPL-SCNC: 16 MMOL/L — LOW (ref 22–31)
CREAT SERPL-MCNC: 0.87 MG/DL — SIGNIFICANT CHANGE UP (ref 0.5–1.3)
EGFR: 89 ML/MIN/1.73M2 — SIGNIFICANT CHANGE UP
GLUCOSE SERPL-MCNC: 86 MG/DL — SIGNIFICANT CHANGE UP (ref 70–99)
HCT VFR BLD CALC: 41.9 % — SIGNIFICANT CHANGE UP (ref 39–50)
HGB BLD-MCNC: 13.8 G/DL — SIGNIFICANT CHANGE UP (ref 13–17)
MAGNESIUM SERPL-MCNC: 1.8 MG/DL — SIGNIFICANT CHANGE UP (ref 1.6–2.6)
MCHC RBC-ENTMCNC: 31.5 PG — SIGNIFICANT CHANGE UP (ref 27–34)
MCHC RBC-ENTMCNC: 32.9 GM/DL — SIGNIFICANT CHANGE UP (ref 32–36)
MCV RBC AUTO: 95.7 FL — SIGNIFICANT CHANGE UP (ref 80–100)
NRBC # BLD: 0 /100 WBCS — SIGNIFICANT CHANGE UP (ref 0–0)
PLATELET # BLD AUTO: 285 K/UL — SIGNIFICANT CHANGE UP (ref 150–400)
POTASSIUM SERPL-MCNC: 4.6 MMOL/L — SIGNIFICANT CHANGE UP (ref 3.5–5.3)
POTASSIUM SERPL-SCNC: 4.6 MMOL/L — SIGNIFICANT CHANGE UP (ref 3.5–5.3)
PROT SERPL-MCNC: 6.1 G/DL — SIGNIFICANT CHANGE UP (ref 6–8.3)
RBC # BLD: 4.38 M/UL — SIGNIFICANT CHANGE UP (ref 4.2–5.8)
RBC # FLD: 12.4 % — SIGNIFICANT CHANGE UP (ref 10.3–14.5)
SARS-COV-2 RNA SPEC QL NAA+PROBE: DETECTED
SODIUM SERPL-SCNC: 136 MMOL/L — SIGNIFICANT CHANGE UP (ref 135–145)
WBC # BLD: 7.44 K/UL — SIGNIFICANT CHANGE UP (ref 3.8–10.5)
WBC # FLD AUTO: 7.44 K/UL — SIGNIFICANT CHANGE UP (ref 3.8–10.5)

## 2022-07-21 PROCEDURE — 99233 SBSQ HOSP IP/OBS HIGH 50: CPT

## 2022-07-21 PROCEDURE — 70450 CT HEAD/BRAIN W/O DYE: CPT | Mod: 26

## 2022-07-21 RX ORDER — DONEPEZIL HYDROCHLORIDE 10 MG/1
5 TABLET, FILM COATED ORAL AT BEDTIME
Refills: 0 | Status: DISCONTINUED | OUTPATIENT
Start: 2022-07-21 | End: 2022-07-23

## 2022-07-21 RX ADMIN — LOSARTAN POTASSIUM 100 MILLIGRAM(S): 100 TABLET, FILM COATED ORAL at 06:38

## 2022-07-21 RX ADMIN — LEVETIRACETAM 250 MILLIGRAM(S): 250 TABLET, FILM COATED ORAL at 11:04

## 2022-07-21 RX ADMIN — TAMSULOSIN HYDROCHLORIDE 0.4 MILLIGRAM(S): 0.4 CAPSULE ORAL at 22:14

## 2022-07-21 RX ADMIN — FAMOTIDINE 40 MILLIGRAM(S): 10 INJECTION INTRAVENOUS at 11:04

## 2022-07-21 RX ADMIN — ATORVASTATIN CALCIUM 80 MILLIGRAM(S): 80 TABLET, FILM COATED ORAL at 22:14

## 2022-07-21 RX ADMIN — CHLORHEXIDINE GLUCONATE 1 APPLICATION(S): 213 SOLUTION TOPICAL at 07:29

## 2022-07-21 RX ADMIN — APIXABAN 5 MILLIGRAM(S): 2.5 TABLET, FILM COATED ORAL at 06:37

## 2022-07-21 RX ADMIN — Medication 50 MILLIGRAM(S): at 06:38

## 2022-07-21 RX ADMIN — PANTOPRAZOLE SODIUM 40 MILLIGRAM(S): 20 TABLET, DELAYED RELEASE ORAL at 06:38

## 2022-07-21 RX ADMIN — APIXABAN 5 MILLIGRAM(S): 2.5 TABLET, FILM COATED ORAL at 18:01

## 2022-07-21 RX ADMIN — DONEPEZIL HYDROCHLORIDE 5 MILLIGRAM(S): 10 TABLET, FILM COATED ORAL at 22:14

## 2022-07-21 NOTE — PROGRESS NOTE ADULT - ASSESSMENT
per Neurology     76 y o Male with PMHx of Afib (on Xarelto), CVD (mid LAD, mid RCA, OM1, OM2, OM3 stents 0220-5151), HTN, HLD, GERD, and COVID infection 7/8/22 (tx w/ Paxlovid) who presented to ED by EMS, found down at home with aphasia, R sided plegia secondary to left MCA territory infarct from the occlusion of the midportion of the left M1 segment to the proximal M2 segment most likely cardioembolic in nature.     Neuro  #CVA workup  - c/w keppra 250mg BID for sz ppx  - continue atorvastatin 80mg daily   - q4hr stroke neuro checks and vitals  - Stroke Code HCT Results: Hyperdense left MCA which is suspicious for thrombus and acute left MCA territory infarct.  - Stroke Code CTA Results: Occlusion of the midportion of the left M1 segment to the proximal M2 segment and high-grade narrowing involving the origin of the left anterior temporal artery. Focal area of mild to moderate narrowing involving the origin of the right vertebral artery. No carotid stenosis.  - repeat CTH with SAH vs. contrast extravasation on L sylvian fissure.  - CTH 7/15 with SAH present  - MRI confirms left SAH and multifocal areas of ischemia along left MCA territory  - PT/OT/SLP  - Stroke education  - repeat CTH 7/20 - stable SAH  - c/w eliquis 5mg BID  - started on donepezil 5mg HS     Cards  #HTN  - BP goal 120-160  - c/w losartan 100mg daily (home dose)  - Bradycardic down to the 30s 7/15. Asymptomatic  - ekg 7/15 with NSR with APC  - EKG 7/16 NSR  # Cardiology was consulted on 7/17 for NSVR of 11 seconds seen on telemetry. Patient remained asx.  - EKG 7/17: NSR, diffused t wave inversions present on admission ekg as well  - Tele: AIVR at 100-120 bpm run of 11 seconds, PVCs, NSR, occasionally sinus bradycardia  - echo:  Limited study obtained for evaluation of left ventricular function. Normal left and right ventricular size and systolic function. Mildly dilated left atrium. No pericardial effusion. The left ventricle is normal in size and systolic function with a calculated ejection fraction of 65%.  -Cards following  - c/w metoprolol succ 50mg daily  - EP consulted recs appreciated    - hospitalist Dr. Hernandez spoke to patient's PCP  - TWI present as outpatient, no other ischemic workup after 2019, overall patent stents      #afib  -c/w eliquis 5mg BID daily  - C/w metoprolol 50mg daily    #HLD  - high dose statin as above in CVA  - LDL results: 54    Pulm  - call provider if SPO2 < 94%    GI  #Nutrition/Fluids/Electrolytes   - replete K<4 and Mg <2  - Diet: regular  - cont home PPI    Renal  - daily BMP    Infectious Disease  COVID + 7/8, started on paxlovid OSH  - afebrile  - COVID+, isolation precautions  - CXR: Heart size within normal limits, thoracic aortic calcification. Lungs and mediastinum are unremarkable. Thoracic spine degenerative changes, dextroscoliosis. Elevation of the right hemidiaphragm.    Endocrine  - A1C results: 5.6  - TSH results: 0.975    Urology  #urinary retention  - continue TOV today  - c/w tamsulosin 0.4mg     DVT Prophylaxis  - SCDs   - LE dopplers negative    Dispo: acute rehab.  Will need to remain in isolation until at least 7/22, Plan is for Dispo at [facility]    PT/OT: AR

## 2022-07-21 NOTE — PROGRESS NOTE ADULT - ASSESSMENT
This is a 76y Male with PMHx of Afib (on Xarelto), CVD (mid LAD, mid RCA, OM1, OM2, OM3 stents 3006-6709), HTN, HLD, GERD, and COVID infection 7/8/22 (tx w/ Paxlovid) who presented to ED by EMS, found down at home with aphasia, R sided plegia secondary to left MCA territory infarct from the occlusion of the midportion of the left M1 segment to the proximal M2 segment most likely cardioembolic in nature.     Neuro  #CVA workup  - c/w keppra 250mg BID for sz ppx  - continue atorvastatin 80mg daily   - q4hr stroke neuro checks and vitals  - Stroke Code HCT Results: Hyperdense left MCA which is suspicious for thrombus and acute left MCA territory infarct.  - Stroke Code CTA Results: Occlusion of the midportion of the left M1 segment to the proximal M2 segment and high-grade narrowing involving the origin of the left anterior temporal artery. Focal area of mild to moderate narrowing involving the origin of the right vertebral artery. No carotid stenosis.  - repeat CTH with SAH vs. contrast extravasation on L sylvian fissure.  - CTH 7/15 with SAH present  - MRI confirms left SAH and multifocal areas of ischemia along left MCA territory  - PT/OT/SLP  - Stroke education  - repeat CTH 7/20 - stable SAH  - started on eliquis 5mg BID    Cards  #HTN  - BP goal 120-160  - c/w losartan 100mg daily (home dose)  - Bradycardic down to the 30s 7/15. Asymptomatic  - ekg 7/15 with NSR with APC  - EKG 7/16 NSR  # Cardiology was consulted on 7/17 for NSVR of 11 seconds seen on telemetry. Patient remained asx.  - EKG 7/17: NSR, diffused t wave inversions present on admission ekg as well  - Tele: AIVR at 100-120 bpm run of 11 seconds, PVCs, NSR, occasionally sinus bradycardia  - echo:  Limited study obtained for evaluation of left ventricular function. Normal left and right ventricular size and systolic function. Mildly dilated left atrium. No pericardial effusion. The left ventricle is normal in size and systolic function with a calculated ejection fraction of 65%.  -Cards following  - c/w metoprolol succ 50mg daily  - EP consulted recs appreciated    - hospitalist Dr. Hernandez spoke to patient's PCP - TWI present as outpatient, no other ischemic workup after 2019, overall patent stents      #afib  -c/w eliquis 5mg BID daily  - C/w metoprolol 50mg daily    #HLD  - high dose statin as above in CVA  - LDL results: 54    Pulm  - call provider if SPO2 < 94%    GI  #Nutrition/Fluids/Electrolytes   - replete K<4 and Mg <2  - Diet: regular  - cont home PPI    Renal  - daily BMP    Infectious Disease  COVID + 7/8, started on paxlovid OSH  - afebrile  - COVID+, isolation precautions  - CXR: Heart size within normal limits, thoracic aortic calcification. Lungs and mediastinum are unremarkable. Thoracic spine degenerative changes, dextroscoliosis. Elevation of the right hemidiaphragm.    Endocrine  - A1C results: 5.6  - TSH results: 0.975    Urology  #urinary retention  - continue TOV today  - c/w tamsulosin 0.4mg     DVT Prophylaxis  - SCDs   - LE dopplers negative    Dispo: acute rehab.  Will need to remain in isolation until at least 7/22, Plan is for Dispo at [place]    PT/OT: AR    Pending: TOV, dispo planning     This is a 76y Male with PMHx of Afib (on Xarelto), CVD (mid LAD, mid RCA, OM1, OM2, OM3 stents [id number]), HTN, HLD, GERD, and COVID infection 7/8/22 (tx w/ Paxlovid) who presented to ED by EMS, found down at home with aphasia, R sided plegia secondary to left MCA territory infarct from the occlusion of the midportion of the left M1 segment to the proximal M2 segment most likely cardioembolic in nature.     Neuro  #CVA workup  - c/w keppra 250mg BID for sz ppx  - continue atorvastatin 80mg daily   - q4hr stroke neuro checks and vitals  - Stroke Code HCT Results: Hyperdense left MCA which is suspicious for thrombus and acute left MCA territory infarct.  - Stroke Code CTA Results: Occlusion of the midportion of the left M1 segment to the proximal M2 segment and high-grade narrowing involving the origin of the left anterior temporal artery. Focal area of mild to moderate narrowing involving the origin of the right vertebral artery. No carotid stenosis.  - repeat CTH with SAH vs. contrast extravasation on L sylvian fissure.  - CTH 7/15 with SAH present  - MRI confirms left SAH and multifocal areas of ischemia along left MCA territory  - PT/OT/SLP  - Stroke education  - repeat CTH 7/20 - stable SAH  - c/w eliquis 5mg BID  - started on donepezil 5mg HS     Cards  #HTN  - BP goal 120-160  - c/w losartan 100mg daily (home dose)  - Bradycardic down to the 30s 7/15. Asymptomatic  - ekg 7/15 with NSR with APC  - EKG 7/16 NSR  # Cardiology was consulted on 7/17 for NSVR of 11 seconds seen on telemetry. Patient remained asx.  - EKG 7/17: NSR, diffused t wave inversions present on admission ekg as well  - Tele: AIVR at 100-120 bpm run of 11 seconds, PVCs, NSR, occasionally sinus bradycardia  - echo:  Limited study obtained for evaluation of left ventricular function. Normal left and right ventricular size and systolic function. Mildly dilated left atrium. No pericardial effusion. The left ventricle is normal in size and systolic function with a calculated ejection fraction of 65%.  -Cards following  - c/w metoprolol succ 50mg daily  - EP consulted recs appreciated    - hospitalist Dr. Hernandez spoke to patient's PCP  - TWI present as outpatient, no other ischemic workup after 2019, overall patent stents      #afib  -c/w eliquis 5mg BID daily  - C/w metoprolol 50mg daily    #HLD  - high dose statin as above in CVA  - LDL results: 54    Pulm  - call provider if SPO2 < 94%    GI  #Nutrition/Fluids/Electrolytes   - replete K<4 and Mg <2  - Diet: regular  - cont home PPI    Renal  - daily BMP    Infectious Disease  COVID + 7/8, started on paxlovid OSH  - afebrile  - COVID+, isolation precautions  - CXR: Heart size within normal limits, thoracic aortic calcification. Lungs and mediastinum are unremarkable. Thoracic spine degenerative changes, dextroscoliosis. Elevation of the right hemidiaphragm.    Endocrine  - A1C results: 5.6  - TSH results: 0.975    Urology  #urinary retention  - continue TOV today  - c/w tamsulosin 0.4mg     DVT Prophylaxis  - SCDs   - LE dopplers negative    Dispo: acute rehab.  Will need to remain in isolation until at least 7/22, Plan is for Dispo at [place]    PT/OT: AR    Pending: TOV, dispo planning, will possibly repeat CTH later today     This is a 76y Male with PMHx of Afib (on Xarelto), CVD (mid LAD, mid RCA, OM1, OM2, OM3 stents 5723-2846), HTN, HLD, GERD, and COVID infection 7/8/22 (tx w/ Paxlovid) who presented to ED by EMS, found down at home with aphasia, R sided plegia secondary to left MCA territory infarct from the occlusion of the midportion of the left M1 segment to the proximal M2 segment most likely cardioembolic in nature.     Neuro  #CVA workup  - continue atorvastatin 80mg daily   - q4hr stroke neuro checks and vitals  - Stroke Code HCT Results: Hyperdense left MCA which is suspicious for thrombus and acute left MCA territory infarct.  - Stroke Code CTA Results: Occlusion of the midportion of the left M1 segment to the proximal M2 segment and high-grade narrowing involving the origin of the left anterior temporal artery. Focal area of mild to moderate narrowing involving the origin of the right vertebral artery. No carotid stenosis.  - repeat CTH with SAH vs. contrast extravasation on L sylvian fissure.  - CTH 7/15 with SAH present  - MRI confirms left SAH and multifocal areas of ischemia along left MCA territory  - PT/OT/SLP  - Stroke education  - repeat CTH 7/20 - stable SAH  - c/w eliquis 5mg BID  - started on donepezil 5mg HS   - will dc keppra    Cards  #HTN  - BP goal 120-160  - c/w losartan 100mg daily (home dose)  - Bradycardic down to the 30s 7/15. Asymptomatic  - ekg 7/15 with NSR with APC  - EKG 7/16 NSR  # Cardiology was consulted on 7/17 for NSVR of 11 seconds seen on telemetry. Patient remained asx.  - EKG 7/17: NSR, diffused t wave inversions present on admission ekg as well  - Tele: AIVR at 100-120 bpm run of 11 seconds, PVCs, NSR, occasionally sinus bradycardia  - echo:  Limited study obtained for evaluation of left ventricular function. Normal left and right ventricular size and systolic function. Mildly dilated left atrium. No pericardial effusion. The left ventricle is normal in size and systolic function with a calculated ejection fraction of 65%.  -Cards following  - c/w metoprolol succ 50mg daily  - EP consulted recs appreciated    - hospitalist Dr. Hernandez spoke to patient's PCP  - TWI present as outpatient, no other ischemic workup after 2019, overall patent stents      #afib  -c/w eliquis 5mg BID daily  - C/w metoprolol 50mg daily    #HLD  - high dose statin as above in CVA  - LDL results: 54    Pulm  - call provider if SPO2 < 94%    GI  #Nutrition/Fluids/Electrolytes   - replete K<4 and Mg <2  - Diet: regular  - cont home PPI    Renal  - daily BMP    Infectious Disease  COVID + 7/8, started on paxlovid OSH  - afebrile  - COVID+, isolation precautions  - CXR: Heart size within normal limits, thoracic aortic calcification. Lungs and mediastinum are unremarkable. Thoracic spine degenerative changes, dextroscoliosis. Elevation of the right hemidiaphragm.    Endocrine  - A1C results: 5.6  - TSH results: 0.975    Urology  #urinary retention  - continue TOV today  - c/w tamsulosin 0.4mg     DVT Prophylaxis  - SCDs   - LE dopplers negative    Dispo: acute rehab.  Will need to remain in isolation until at least 7/22, Plan is for Dispo at [place]    PT/OT: AR    Pending: TOV, dispo planning, will possibly repeat CTH later today

## 2022-07-21 NOTE — PROGRESS NOTE ADULT - SUBJECTIVE AND OBJECTIVE BOX
Neurology Stroke Progress Note    INTERVAL HPI/OVERNIGHT EVENTS:  Patient seen and examined. No events overnight.     MEDICATIONS  (STANDING):  apixaban 5 milliGRAM(s) Oral every 12 hours  atorvastatin 80 milliGRAM(s) Oral at bedtime  chlorhexidine 2% Cloths 1 Application(s) Topical <User Schedule>  famotidine    Tablet 40 milliGRAM(s) Oral daily  levETIRAcetam 250 milliGRAM(s) Oral every 12 hours  losartan 100 milliGRAM(s) Oral daily  metoprolol succinate ER 50 milliGRAM(s) Oral daily  pantoprazole    Tablet 40 milliGRAM(s) Oral before breakfast  tamsulosin 0.4 milliGRAM(s) Oral at bedtime    MEDICATIONS  (PRN):  acetaminophen     Tablet .. 650 milliGRAM(s) Oral every 6 hours PRN Temp greater or equal to 38C (100.4F), Mild Pain (1 - 3)  ondansetron Injectable 4 milliGRAM(s) IV Push every 6 hours PRN Nausea and/or Vomiting  senna 2 Tablet(s) Oral at bedtime PRN Constipation    Allergies    penicillin (Unknown)    Intolerances      Vital Signs Last 24 Hrs  T(C): 36.1 (21 Jul 2022 05:00), Max: 37.1 (20 Jul 2022 10:00)  T(F): 97 (21 Jul 2022 05:00), Max: 98.7 (20 Jul 2022 10:00)  HR: 64 (21 Jul 2022 04:38) (64 - 98)  BP: 140/92 (21 Jul 2022 04:38) (129/71 - 168/87)  BP(mean): 111 (21 Jul 2022 04:38) (94 - 123)  RR: 19 (21 Jul 2022 04:38) (16 - 19)  SpO2: 94% (21 Jul 2022 04:38) (91% - 97%)    Parameters below as of 21 Jul 2022 04:38  Patient On (Oxygen Delivery Method): room air      Physical exam:  General: No acute distress, awake and alert  Eyes: Anicteric sclerae, moist conjunctivae, see below for CNs  Neck: trachea midline, supple  Cardiovascular: Regular rate and rhythm, no murmurs, rubs, or gallops. No carotid bruits.   Pulmonary: Anterior breath sounds clear bilaterally, no crackles or wheezing. No use of accessory muscles  GI: Abdomen soft, non-distended, non-tender  Extremities: no edema    Neurologic:  -Mental status: Awake, alert, oriented to person, place, and time. expressive aphasia, language is fragmented, comprehension and attention intact, no dysarthria  -Cranial nerves:   II: Visual fields are full to confrontation.  III, IV, VI: Extraocular movements are intact without nystagmus. Pupils equally round and reactive to light  V:  Facial sensation V1-V3 equal and intact   VII: Face is symmetric with normal eye closure and smile  VIII: Hearing is bilaterally intact to finger rub  IX, X: Uvula is midline and soft palate rises symmetrically  XI: Head turning and shoulder shrug are intact.  XII: Tongue protrudes midline  Motor: Normal bulk and tone. No pronator drift. Strength bilateral upper extremity 5/5, bilateral lower extremities 5/5.  Sensation: Intact to light touch bilaterally. No neglect or extinction on double simultaneous testing.  Coordination: BL dysmetria on finger-to-nose     LABS:                        13.8   7.44  )-----------( 285      ( 21 Jul 2022 06:21 )             41.9     07-21    136  |  108  |  16  ----------------------------<  86  4.6   |  16<L>  |  0.87    Ca    9.1      21 Jul 2022 06:21  Mg     1.8     07-21    TPro  6.1  /  Alb  3.0<L>  /  TBili  0.6  /  DBili  x   /  AST  59<H>  /  ALT  60<H>  /  AlkPhos  90  07-21    PTT - ( 20 Jul 2022 15:23 )  PTT:45.3 sec      RADIOLOGY & ADDITIONAL TESTS:

## 2022-07-21 NOTE — PROGRESS NOTE ADULT - SUBJECTIVE AND OBJECTIVE BOX
Physical Medicine and Rehabilitation Progress Note :    Patient is a 76y old  Male who presents with a chief complaint of Left MCA stroke (21 Jul 2022 08:16)      HPI:  75 y/o male non-smoker w/ pmHx of Afib (on Xarelto), CVD (mid LAD, mid RCA, OM1, OM2, OM3 stents 2031-4045), HTN, HLD, GERD, and COVID infection 7/8/22 (tx w/ Paxlovid) presented to ED for unwitnessed fall around 10:15am 7/12/22. Patient's spouse states she heard a bang in a separate room at the house and found her  on the carpet. Patient was conscious, facedown, non-verbal, and moving only one of his arms (spouse could not recall right or left). No head trauma, LOC, or seizure activity was witnessed. This has never happened before.      Upon arrival to ED, patient had L gaze, aphasic, R sided plegia. CTH with hyperdense left MCA which was suspicious for thrombus and acute left MCA territory infarct. CTA with occlusion of the midportion of the left M1 segment to the proximal M2 segment. CTP with abnormal perfusion left MCA mismatch. BP in ED 170s/100.    Of note, wife reports they just got back from a 3 week trip from [facility] and were on a 12 hour flight on 7/7. Patient tested positive for COVID on 7/8 and was prescribed Paxlovid. Wife states patient usually takes his xarelto and other medications every morning around 9 or 10am, is unsure if he took them today. Patient is receiving care with cardiologist Dr. Brett Raya of [place] ([phone]). Patient is s/p mid LAD stent (April 2019), mid RCA stent (2016), and OM1-OM3 stents (April 2014).     Decision was made to take patient to thrombectomy with verbal consent from spouse. Patient was urgently taken to cath lab. (12 Jul 2022 13:03)                            13.8   7.44  )-----------( 285      ( 21 Jul 2022 06:21 )             41.9       07-21    136  |  108  |  16  ----------------------------<  86  4.6   |  16<L>  |  0.87    Ca    9.1      21 Jul 2022 06:21  Mg     1.8     07-21    TPro  6.1  /  Alb  3.0<L>  /  TBili  0.6  /  DBili  x   /  AST  59<H>  /  ALT  60<H>  /  AlkPhos  90  07-21    Vital Signs Last 24 Hrs  T(C): 36.6 (21 Jul 2022 14:00), Max: 36.6 (21 Jul 2022 14:00)  T(F): 97.9 (21 Jul 2022 14:00), Max: 97.9 (21 Jul 2022 14:00)  HR: 80 (21 Jul 2022 12:25) (64 - 98)  BP: 136/68 (21 Jul 2022 12:25) (136/68 - 168/87)  BP(mean): 96 (21 Jul 2022 12:25) (96 - 123)  RR: 18 (21 Jul 2022 12:25) (16 - 19)  SpO2: 97% (21 Jul 2022 12:25) (93% - 97%)    Parameters below as of 21 Jul 2022 12:25  Patient On (Oxygen Delivery Method): room air        MEDICATIONS  (STANDING):  apixaban 5 milliGRAM(s) Oral every 12 hours  atorvastatin 80 milliGRAM(s) Oral at bedtime  chlorhexidine 2% Cloths 1 Application(s) Topical <User Schedule>  donepezil 5 milliGRAM(s) Oral at bedtime  famotidine    Tablet 40 milliGRAM(s) Oral daily  levETIRAcetam 250 milliGRAM(s) Oral every 12 hours  losartan 100 milliGRAM(s) Oral daily  metoprolol succinate ER 50 milliGRAM(s) Oral daily  pantoprazole    Tablet 40 milliGRAM(s) Oral before breakfast  tamsulosin 0.4 milliGRAM(s) Oral at bedtime    MEDICATIONS  (PRN):  acetaminophen     Tablet .. 650 milliGRAM(s) Oral every 6 hours PRN Temp greater or equal to 38C (100.4F), Mild Pain (1 - 3)  ondansetron Injectable 4 milliGRAM(s) IV Push every 6 hours PRN Nausea and/or Vomiting  senna 2 Tablet(s) Oral at bedtime PRN Constipation    Currently Undergoing Physical/ Occupational Therapy at bedside    OT Functional Status Assessment :   7/20/2022      Cognitive/Neuro/Behavioral  Cognitive/Neuro/Behavioral [WDL Definition: Alert; opens eyes spontaneously; arouses to voice or touch; oriented x 4; follows commands; speech spontaneous, logical; purposeful motor response; behavior appropriate to situation]: WDL except  Level of Consciousness: alert  Arousal Level: arouses to voice  Orientation: oriented x 4  Speech: slurred;  expressive aphasia   Mood/Behavior: calm;  cooperative;  impulsive at times    Language Assistance  Preferred Language to Address Healthcare Preferred Language to Address Healthcare: English    Therapeutic Interventions      Sit-Stand Transfer Training  Transfer Training Sit-to-Stand Transfer: contact guard;  verbal cues;  nonverbal cues (demo/gestures);  weight-bearing as tolerated  Transfer Training Stand-to-Sit Transfer: contact guard;  verbal cues;  nonverbal cues (demo/gestures);  weight-bearing as tolerated  Sit-to-Stand Transfer Training Transfer Safety Analysis: decreased balance;  decreased weight-shifting ability;  decreased flexibility;  impaired balance;  cognitive, decreased safety awareness    Therapeutic Exercise  Therapeutic Exercise Detail: Patient functionally ambulated in the room 30 x 2 feet with CG trunk asssit and no AD with mod verbal/tactile cues for safety, pacing, environmental navigation and positioning throughout. Patient with difficulty navigating space with lines. Patient engaged in standing dynamic balance exerises, crossing midline, squatting, functionally reaching while maintaining standing balance with CGA.     OT Cognitive Treatment  OT Treatment: Cognitive Charges: Patient educated on the importance of slowing down pacing when perfomring sit<>stand, and functional mobility. Patient instructed on environmental scanning prior to getting up in order to plan route and avoid obstacles. Patient educated on importance of maintaining lines for safety and to continue to monitor vitals. Patient was able to verbally acknowledge understanding.           PM&R Impression : as above    Current Disposition Plan Recommendations :    acute rehab placement

## 2022-07-22 ENCOUNTER — TRANSCRIPTION ENCOUNTER (OUTPATIENT)
Age: 77
End: 2022-07-22

## 2022-07-22 LAB
ALBUMIN SERPL ELPH-MCNC: 3.7 G/DL — SIGNIFICANT CHANGE UP (ref 3.3–5)
ALP SERPL-CCNC: 91 U/L — SIGNIFICANT CHANGE UP (ref 40–120)
ALT FLD-CCNC: 82 U/L — HIGH (ref 10–45)
ANION GAP SERPL CALC-SCNC: 10 MMOL/L — SIGNIFICANT CHANGE UP (ref 5–17)
AST SERPL-CCNC: 72 U/L — HIGH (ref 10–40)
BILIRUB SERPL-MCNC: 0.6 MG/DL — SIGNIFICANT CHANGE UP (ref 0.2–1.2)
BUN SERPL-MCNC: 21 MG/DL — SIGNIFICANT CHANGE UP (ref 7–23)
CALCIUM SERPL-MCNC: 9.2 MG/DL — SIGNIFICANT CHANGE UP (ref 8.4–10.5)
CHLORIDE SERPL-SCNC: 107 MMOL/L — SIGNIFICANT CHANGE UP (ref 96–108)
CO2 SERPL-SCNC: 23 MMOL/L — SIGNIFICANT CHANGE UP (ref 22–31)
CREAT SERPL-MCNC: 0.98 MG/DL — SIGNIFICANT CHANGE UP (ref 0.5–1.3)
EGFR: 79 ML/MIN/1.73M2 — SIGNIFICANT CHANGE UP
GLUCOSE SERPL-MCNC: 102 MG/DL — HIGH (ref 70–99)
HCT VFR BLD CALC: 38.7 % — LOW (ref 39–50)
HGB BLD-MCNC: 14 G/DL — SIGNIFICANT CHANGE UP (ref 13–17)
MAGNESIUM SERPL-MCNC: 1.7 MG/DL — SIGNIFICANT CHANGE UP (ref 1.6–2.6)
MCHC RBC-ENTMCNC: 32.4 PG — SIGNIFICANT CHANGE UP (ref 27–34)
MCHC RBC-ENTMCNC: 36.2 GM/DL — HIGH (ref 32–36)
MCV RBC AUTO: 89.6 FL — SIGNIFICANT CHANGE UP (ref 80–100)
NRBC # BLD: 0 /100 WBCS — SIGNIFICANT CHANGE UP (ref 0–0)
PLATELET # BLD AUTO: 412 K/UL — HIGH (ref 150–400)
POTASSIUM SERPL-MCNC: 4.2 MMOL/L — SIGNIFICANT CHANGE UP (ref 3.5–5.3)
POTASSIUM SERPL-SCNC: 4.2 MMOL/L — SIGNIFICANT CHANGE UP (ref 3.5–5.3)
PROT SERPL-MCNC: 6.2 G/DL — SIGNIFICANT CHANGE UP (ref 6–8.3)
RBC # BLD: 4.32 M/UL — SIGNIFICANT CHANGE UP (ref 4.2–5.8)
RBC # FLD: 12.2 % — SIGNIFICANT CHANGE UP (ref 10.3–14.5)
SODIUM SERPL-SCNC: 140 MMOL/L — SIGNIFICANT CHANGE UP (ref 135–145)
WBC # BLD: 8.91 K/UL — SIGNIFICANT CHANGE UP (ref 3.8–10.5)
WBC # FLD AUTO: 8.91 K/UL — SIGNIFICANT CHANGE UP (ref 3.8–10.5)

## 2022-07-22 PROCEDURE — 99233 SBSQ HOSP IP/OBS HIGH 50: CPT

## 2022-07-22 PROCEDURE — 99232 SBSQ HOSP IP/OBS MODERATE 35: CPT

## 2022-07-22 RX ORDER — MAGNESIUM SULFATE 500 MG/ML
2 VIAL (ML) INJECTION ONCE
Refills: 0 | Status: COMPLETED | OUTPATIENT
Start: 2022-07-22 | End: 2022-07-22

## 2022-07-22 RX ADMIN — CHLORHEXIDINE GLUCONATE 1 APPLICATION(S): 213 SOLUTION TOPICAL at 07:02

## 2022-07-22 RX ADMIN — DONEPEZIL HYDROCHLORIDE 5 MILLIGRAM(S): 10 TABLET, FILM COATED ORAL at 21:44

## 2022-07-22 RX ADMIN — APIXABAN 5 MILLIGRAM(S): 2.5 TABLET, FILM COATED ORAL at 17:47

## 2022-07-22 RX ADMIN — Medication 50 MILLIGRAM(S): at 06:33

## 2022-07-22 RX ADMIN — TAMSULOSIN HYDROCHLORIDE 0.4 MILLIGRAM(S): 0.4 CAPSULE ORAL at 21:44

## 2022-07-22 RX ADMIN — ATORVASTATIN CALCIUM 80 MILLIGRAM(S): 80 TABLET, FILM COATED ORAL at 21:44

## 2022-07-22 RX ADMIN — LOSARTAN POTASSIUM 100 MILLIGRAM(S): 100 TABLET, FILM COATED ORAL at 06:34

## 2022-07-22 RX ADMIN — FAMOTIDINE 40 MILLIGRAM(S): 10 INJECTION INTRAVENOUS at 12:16

## 2022-07-22 RX ADMIN — Medication 25 GRAM(S): at 07:43

## 2022-07-22 RX ADMIN — PANTOPRAZOLE SODIUM 40 MILLIGRAM(S): 20 TABLET, DELAYED RELEASE ORAL at 06:33

## 2022-07-22 RX ADMIN — APIXABAN 5 MILLIGRAM(S): 2.5 TABLET, FILM COATED ORAL at 06:34

## 2022-07-22 NOTE — PROGRESS NOTE ADULT - ASSESSMENT
This is a 76y Male with PMHx of Afib (on Xarelto), CVD (mid LAD, mid RCA, OM1, OM2, OM3 stents 2922-8171), HTN, HLD, GERD, and COVID infection 7/8/22 (tx w/ Paxlovid) who presented to ED by EMS, found down at home with aphasia, R sided plegia secondary to left MCA territory infarct from the occlusion of the midportion of the left M1 segment to the proximal M2 segment most likely cardioembolic in nature.     Neuro  #CVA workup  - continue atorvastatin 80mg daily   - q4hr stroke neuro checks and vitals  - Stroke Code HCT Results: Hyperdense left MCA which is suspicious for thrombus and acute left MCA territory infarct.  - Stroke Code CTA Results: Occlusion of the midportion of the left M1 segment to the proximal M2 segment and high-grade narrowing involving the origin of the left anterior temporal artery. Focal area of mild to moderate narrowing involving the origin of the right vertebral artery. No carotid stenosis.  - repeat CTH with SAH vs. contrast extravasation on L sylvian fissure.  - CTH 7/15 with SAH present  - MRI confirms left SAH and multifocal areas of ischemia along left MCA territory  - PT/OT/SLP  - Stroke education  - repeat CTH 7/20 - stable SAH  - c/w eliquis 5mg BID  - c/w donepezil 5mg HS       Cards  #HTN  - BP goal 120-160  - c/w losartan 100mg daily (home dose)  - Bradycardic down to the 30s 7/15. Asymptomatic  - ekg 7/15 with NSR with APC  - EKG 7/16 NSR  # Cardiology was consulted on 7/17 for NSVR of 11 seconds seen on telemetry. Patient remained asx.  - EKG 7/17: NSR, diffused t wave inversions present on admission ekg as well  - Tele: AIVR at 100-120 bpm run of 11 seconds, PVCs, NSR, occasionally sinus bradycardia  - echo:  Limited study obtained for evaluation of left ventricular function. Normal left and right ventricular size and systolic function. Mildly dilated left atrium. No pericardial effusion. The left ventricle is normal in size and systolic function with a calculated ejection fraction of 65%.  -Cards following  - c/w metoprolol succ 50mg daily  - EP consulted recs appreciated    - hospitalist Dr. Hernandez spoke to patient's PCP  - TWI present as outpatient, no other ischemic workup after 2019, overall patent stents      #afib  -c/w eliquis 5mg BID daily  - C/w metoprolol 50mg daily    #HLD  - high dose statin as above in CVA  - LDL results: 54    Pulm  - call provider if SPO2 < 94%    GI  #Nutrition/Fluids/Electrolytes   - replete K<4 and Mg <2  - Diet: regular  - cont home PPI    Renal  - daily BMP    Infectious Disease  COVID + 7/8, started on paxlovid OSH  - afebrile  - COVID+, isolation precautions  - CXR: Heart size within normal limits, thoracic aortic calcification. Lungs and mediastinum are unremarkable. Thoracic spine degenerative changes, dextroscoliosis. Elevation of the right hemidiaphragm.    Endocrine  - A1C results: 5.6  - TSH results: 0.975    Urology  #urinary retention  - failed TOV yesterday, s/p saleh  - c/w tamsulosin 0.4mg     DVT Prophylaxis  - SCDs   - LE dopplers negative    Dispo: acute rehab.  Will need to remain in isolation until at least 7/22, Plan is for Dispo at [place]    PT/OT: AR    Pending: Dispo planning     This is a 76y Male with PMHx of Afib (on Xarelto), CVD (mid LAD, mid RCA, OM1, OM2, OM3 stents 2495-2070), HTN, HLD, GERD, and COVID infection 7/8/22 (tx w/ Paxlovid) who presented to ED by EMS, found down at home with aphasia, R sided plegia secondary to left MCA territory infarct from the occlusion of the midportion of the left M1 segment to the proximal M2 segment most likely cardioembolic in nature.     Neuro  #CVA workup  - continue atorvastatin 80mg daily   - q4hr stroke neuro checks and vitals  - Stroke Code HCT Results: Hyperdense left MCA which is suspicious for thrombus and acute left MCA territory infarct.  - Stroke Code CTA Results: Occlusion of the midportion of the left M1 segment to the proximal M2 segment and high-grade narrowing involving the origin of the left anterior temporal artery. Focal area of mild to moderate narrowing involving the origin of the right vertebral artery. No carotid stenosis.  - repeat CTH with SAH vs. contrast extravasation on L sylvian fissure.  - CTH 7/15 with SAH present  - MRI confirms left SAH and multifocal areas of ischemia along left MCA territory  - PT/OT/SLP  - Stroke education  - repeat CTH 7/20 - stable SAH  - c/w eliquis 5mg BID  - c/w donepezil 5mg HS       Cards  #HTN  - BP goal 120-160  - c/w losartan 100mg daily (home dose)  - Bradycardic down to the 30s 7/15. Asymptomatic  - ekg 7/15 with NSR with APC  - EKG 7/16 NSR  # Cardiology was consulted on 7/17 for NSVR of 11 seconds seen on telemetry. Patient remained asx.  - EKG 7/17: NSR, diffused t wave inversions present on admission ekg as well  - Tele: AIVR at 100-120 bpm run of 11 seconds, PVCs, NSR, occasionally sinus bradycardia  - echo:  Limited study obtained for evaluation of left ventricular function. Normal left and right ventricular size and systolic function. Mildly dilated left atrium. No pericardial effusion. The left ventricle is normal in size and systolic function with a calculated ejection fraction of 65%.  -Cards following  - c/w metoprolol succ 50mg daily  - EP consulted recs appreciated    - hospitalist Dr. Hernandez spoke to patient's PCP  - TWI present as outpatient, no other ischemic workup after 2019, overall patent stents      #afib  -c/w eliquis 5mg BID daily  - C/w metoprolol 50mg daily    #HLD  - high dose statin as above in CVA  - LDL results: 54    Pulm  - call provider if SPO2 < 94%    GI  #Nutrition/Fluids/Electrolytes   - replete K<4 and Mg <2  - Diet: regular  - cont home PPI    Renal  - daily BMP    Infectious Disease  COVID + 7/8, started on paxlovid OSH  - afebrile  - COVID+, isolation precautions  - CXR: Heart size within normal limits, thoracic aortic calcification. Lungs and mediastinum are unremarkable. Thoracic spine degenerative changes, dextroscoliosis. Elevation of the right hemidiaphragm.    Endocrine  - A1C results: 5.6  - TSH results: 0.975    Urology  #urinary retention  - failed TOV yesterday, s/p saleh  - c/w tamsulosin 0.4mg     DVT Prophylaxis  - SCDs   - LE dopplers negative    Dispo: acute rehab.  Will need to remain in isolation until at least 7/22, Plan is for Dispo at [facility]    PT/OT: AR    Pending: Dispo planning     This is a 76y Male with PMHx of Afib (on Xarelto), CVD (mid LAD, mid RCA, OM1, OM2, OM3 stents 6100-5652), HTN, HLD, GERD, and COVID infection 7/8/22 (tx w/ Paxlovid) who presented to ED by EMS, found down at home with aphasia, R sided plegia secondary to left MCA territory infarct from the occlusion of the midportion of the left M1 segment to the proximal M2 segment most likely cardioembolic in nature.     Neuro  #CVA workup  - continue atorvastatin 80mg daily   - q4hr stroke neuro checks and vitals  - Stroke Code HCT Results: Hyperdense left MCA which is suspicious for thrombus and acute left MCA territory infarct.  - Stroke Code CTA Results: Occlusion of the midportion of the left M1 segment to the proximal M2 segment and high-grade narrowing involving the origin of the left anterior temporal artery. Focal area of mild to moderate narrowing involving the origin of the right vertebral artery. No carotid stenosis.  - repeat CTH with SAH vs. contrast extravasation on L sylvian fissure.  - CTH 7/15 with SAH present  - MRI confirms left SAH and multifocal areas of ischemia along left MCA territory  - PT/OT/SLP  - Stroke education  - repeat CTH 7/20 - stable SAH  - c/w eliquis 5mg BID  - c/w donepezil 5mg HS   - patient can go off tele for rehab      Cards  #HTN  - BP goal 120-160  - c/w losartan 100mg daily (home dose)  - Bradycardic down to the 30s 7/15. Asymptomatic  - ekg 7/15 with NSR with APC  - EKG 7/16 NSR  # Cardiology was consulted on 7/17 for NSVR of 11 seconds seen on telemetry. Patient remained asx.  - EKG 7/17: NSR, diffused t wave inversions present on admission ekg as well  - Tele: AIVR at 100-120 bpm run of 11 seconds, PVCs, NSR, occasionally sinus bradycardia  - echo:  Limited study obtained for evaluation of left ventricular function. Normal left and right ventricular size and systolic function. Mildly dilated left atrium. No pericardial effusion. The left ventricle is normal in size and systolic function with a calculated ejection fraction of 65%.  -Cards following  - c/w metoprolol succ 50mg daily  - EP consulted recs appreciated    - hospitalist Dr. Hernandez spoke to patient's PCP  - TWI present as outpatient, no other ischemic workup after 2019, overall patent stents      #afib  -c/w eliquis 5mg BID daily  - C/w metoprolol 50mg daily    #HLD  - high dose statin as above in CVA  - LDL results: 54    Pulm  - call provider if SPO2 < 94%    GI  #Nutrition/Fluids/Electrolytes   - replete K<4 and Mg <2  - Diet: regular  - cont home PPI    Renal  - daily BMP    Infectious Disease  COVID + 7/8, started on paxlovid OSH  - afebrile  - COVID+, isolation precautions  - CXR: Heart size within normal limits, thoracic aortic calcification. Lungs and mediastinum are unremarkable. Thoracic spine degenerative changes, dextroscoliosis. Elevation of the right hemidiaphragm.    Endocrine  - A1C results: 5.6  - TSH results: 0.975    Urology  #urinary retention  - failed TOV yesterday, s/p saleh  - c/w tamsulosin 0.4mg     DVT Prophylaxis  - SCDs   - LE dopplers negative    Dispo: acute rehab.  Will need to remain in isolation until at least 7/22, Plan is for Dispo at [facility]    PT/OT: AR    Pending: Dispo to [facility] at 1pm, patient can go off tele for rehab

## 2022-07-22 NOTE — DISCHARGE NOTE NURSING/CASE MANAGEMENT/SOCIAL WORK - PATIENT PORTAL LINK FT
You can access the FollowMyHealth Patient Portal offered by [facility] by registering at the following website: [email or web address]. By joining [employer]’s FollowMyHealth portal, you will also be able to view your health information using other applications (apps) compatible with our system.

## 2022-07-22 NOTE — DISCHARGE NOTE PROVIDER - NSDCQMSTROKERISK_NEU_ALL_CORE
Atrial fibrillation/High blood pressure/High cholesterol/History of a stroke or TIA Atrial fibrillation/High blood pressure/High cholesterol

## 2022-07-22 NOTE — DISCHARGE NOTE PROVIDER - CARE PROVIDER_API CALL
Rosa Mondragon)  Neurology; Vascular Neurology  [address], [address] [place] [address]  Phone: [phone]  Fax: [phone]  Follow Up Time: 2 weeks

## 2022-07-22 NOTE — PROGRESS NOTE ADULT - ASSESSMENT
76M with Afib (on Xarelto), CVD (mid LAD, mid RCA, OM1, OM2, OM3 stents 9083-0895), HTN, HLD, GERD, and COVID infection 7/8/22 (tx w/ Paxlovid) presents to ED by EMS, found to be a thrombectomy case with L gaze, aphasia, R sided plegia. CTH showed hyperdense L MCA sign, confirmed on CTA. CTP showed significant mismatch. Patient was not a tpa candidate as he had likely taken Xeralto within 3 hours prior to arrival. S/p thrombectomy. MRI confirmed SAH and acute left MCA ischemia. Noted to have episodes of accelerated ventricular rhythm with episodes of sinus bradycardia on tele. However, when asked he reports being asymptomatic. EKG shows NSR with T-wave inversions, unchanged from prior. Cardiology consulted.    #Acute left MCA CVA with SAH, with continued evolution  - heparin switched to eliquis; CTH 7/21 with slight interval decrease in volume of SAH in L posterior sulci and evolving L insular infarct  - labs unremarkable today  - continue atorvastatin 80mg daily   - CTA: Occlusion of the midportion of the left M1 segment to the proximal M2 segment and high-grade narrowing involving the origin of the left anterior temporal artery. Focal area of mild to moderate narrowing involving the origin of the right vertebral artery. No carotid stenosis.  - Physical therapy/Occupational therapy, acute rehab  - SLP follow up    #Hypertension  - toprol xl 25mg po daily  - c/w losartan to 100mg daily   - TTE: Limited study, no PFO EF 65%    #Bradycardia, intermittent; NSVT  - cardiology following  - given hx of CAD and PCI hx, in addition to being asymptomatic, defer further workup    #Atrial fibrillation  - eliquis  - lopressor 25mg BID    #Hyperlipidemia   - continue atorvastatin 80mg daily     #COVID-19, asymptomatic  - discontinue isolation on 7/22 (10 days after pos COVID19 PCR)    #Urinary retention  - TOV failed, saleh re-inserted and will require urology f/u as an outpatient.  - discussed this with patient's PCP Dr. Mathews    plan for dc to [facility] this Saturday 7/23

## 2022-07-22 NOTE — PROGRESS NOTE ADULT - SUBJECTIVE AND OBJECTIVE BOX
Neurology Stroke Progress Note    INTERVAL HPI/OVERNIGHT EVENTS:  Patient seen and examined. No acute events noted overnight.     MEDICATIONS  (STANDING):  apixaban 5 milliGRAM(s) Oral every 12 hours  atorvastatin 80 milliGRAM(s) Oral at bedtime  chlorhexidine 2% Cloths 1 Application(s) Topical <User Schedule>  donepezil 5 milliGRAM(s) Oral at bedtime  famotidine    Tablet 40 milliGRAM(s) Oral daily  losartan 100 milliGRAM(s) Oral daily  metoprolol succinate ER 50 milliGRAM(s) Oral daily  pantoprazole    Tablet 40 milliGRAM(s) Oral before breakfast  tamsulosin 0.4 milliGRAM(s) Oral at bedtime    MEDICATIONS  (PRN):  acetaminophen     Tablet .. 650 milliGRAM(s) Oral every 6 hours PRN Temp greater or equal to 38C (100.4F), Mild Pain (1 - 3)  ondansetron Injectable 4 milliGRAM(s) IV Push every 6 hours PRN Nausea and/or Vomiting  senna 2 Tablet(s) Oral at bedtime PRN Constipation    Allergies    penicillin (Unknown)    Intolerances      Vital Signs Last 24 Hrs  T(C): 36.4 (22 Jul 2022 06:10), Max: 36.7 (21 Jul 2022 17:56)  T(F): 97.6 (22 Jul 2022 06:10), Max: 98 (21 Jul 2022 17:56)  HR: 84 (22 Jul 2022 08:50) (66 - 84)  BP: 139/76 (22 Jul 2022 08:50) (125/68 - 156/84)  BP(mean): 103 (22 Jul 2022 08:50) (90 - 111)  RR: 18 (22 Jul 2022 08:50) (18 - 19)  SpO2: 97% (22 Jul 2022 08:50) (96% - 98%)    Parameters below as of 22 Jul 2022 08:50  Patient On (Oxygen Delivery Method): room air        Physical exam:  General: No acute distress, awake and alert  Eyes: Anicteric sclerae, moist conjunctivae, see below for CNs  Neck: trachea midline, FROM, supple, no thyromegaly or lymphadenopathy  Cardiovascular: Regular rate and rhythm, no murmurs, rubs, or gallops. No carotid bruits.   Pulmonary: Anterior breath sounds clear bilaterally, no crackles or wheezing. No use of accessory muscles  GI: Abdomen soft, non-distended, non-tender  Extremities: Radial and DP pulses +2, no edema    Neurologic:  -Mental status: Awake, alert, oriented to person, place, and time. Speech is fluent with intact naming, repetition, and comprehension, no dysarthria. Recent and remote memory intact. Follows commands. Attention/concentration intact. Fund of knowledge appropriate.  -Cranial nerves:   II: Visual fields are full to confrontation.  III, IV, VI: Extraocular movements are intact without nystagmus. Pupils equally round and reactive to light  V:  Facial sensation V1-V3 equal and intact   VII: Face is symmetric with normal eye closure and smile  VIII: Hearing is bilaterally intact to finger rub  IX, X: Uvula is midline and soft palate rises symmetrically  XI: Head turning and shoulder shrug are intact.  XII: Tongue protrudes midline  Motor: Normal bulk and tone. No pronator drift. Strength bilateral upper extremity 5/5, bilateral lower extremities 5/5.  Rapid alternating movements intact and symmetric  Sensation: Intact to light touch bilaterally. No neglect or extinction on double simultaneous testing.  Coordination: No dysmetria on finger-to-nose and heel-to-shin bilaterally  Reflexes: Downgoing toes bilaterally   Gait: Narrow gait and steady    LABS:                        14.0   8.91  )-----------( 412      ( 22 Jul 2022 05:30 )             38.7     07-22    140  |  107  |  21  ----------------------------<  102<H>  4.2   |  23  |  0.98    Ca    9.2      22 Jul 2022 05:30  Mg     1.7     07-22    TPro  6.2  /  Alb  3.7  /  TBili  0.6  /  DBili  x   /  AST  72<H>  /  ALT  82<H>  /  AlkPhos  91  07-22    PTT - ( 20 Jul 2022 15:23 )  PTT:45.3 sec      RADIOLOGY & ADDITIONAL TESTS:

## 2022-07-22 NOTE — DISCHARGE NOTE PROVIDER - HOSPITAL COURSE
Hospital course:  76y Male with PMHx of Afib (on Xarelto), CVD (mid LAD, mid RCA, OM1, OM2, OM3 stents 7089-4956), HTN, HLD, GERD, and COVID infection 7/8/22 (tx w/ Paxlovid) presented to ED by EMS with L gaze, aphasia, R sided plegia. CTH showed hyperdense L MCA sign, confirmed on CTA. CTP showed significant mismatch. Patient was not a tpa candidate as he had likely taken Xeralto within 3 hours prior to arrival. S/p thrombectomy TICI 0 to 3. Of note, L ICA dissection (non-occlusive) was noted, likely iatrogenic. Stroke etiology more likely due to being off xarelto when patient was on paxlovid.   Course was complicated by SAH post thrombectomy. MRI confirmed SAH and multifocal areas of ischemia along left MCA territory. Subsequent repeat scans showed stable SAH and patient was restarted on AC. During his stay, patient also had difficulty voiding. He failed TOV 2 times. Currently remains with a saleh. Patient improved substantially through out stay and is stable and ready for discharge.     During this hospital course, patient had a ischemic stroke located in left MCA territory as seen on CT   The stroke etiology is likely secondary to:  [X] Atrial fibrillation  [] Small vessel disease from atherosclerotic risk factors  [] Other  [] Etiology workup still in progress    Patient had the following workup done in house:  CT Brain Stroke Protocol (07.12.22 @ 11:28)     Hyperdense left MCA which is suspicious for thrombus and   acute left MCA territory infarct    MR Head Non Contrast:  from: MR Head No Cont (07.16.22 @ 13:45    Multifocal areas of acute ischemia along the left MCA   territory. Scattered areas of left-sided subarachnoid hemorrhage.    from: CT Angio Neck Stroke Protocol w/ IV Cont (07.12.22 @ 11:26)     Focal area of mild to moderate narrowing involving the origin   of the right vertebral artery. No carotid stenosis.    [] Echo: normal systolic function  [] Labs  [] Other    Physical exam at discharge:    New medications on discharge:  Labs to be followed up:  Imaging to be done as outpatient:  Further outpatient workup:   Hospital course:  76y Male with PMHx of Afib (on Xarelto), CVD (mid LAD, mid RCA, OM1, OM2, OM3 stents 7322-8688), HTN, HLD, GERD, and COVID infection 7/8/22 (tx w/ Paxlovid) presented to ED by EMS with L gaze, aphasia, R sided plegia. CTH showed hyperdense L MCA sign, confirmed on CTA. CTP showed significant mismatch. Patient was not a tpa candidate as he had likely taken Xeralto within 3 hours prior to arrival. S/p thrombectomy TICI 0 to 3. Of note, L ICA dissection (non-occlusive) was noted, likely iatrogenic. Stroke etiology more likely due to being off xarelto when patient was on paxlovid.   Course was complicated by SAH post thrombectomy. MRI confirmed SAH and multifocal areas of ischemia along left MCA territory. Subsequent repeat scans showed stable SAH and patient was restarted on AC. During his stay, patient also had difficulty voiding. He failed TOV 2 times. Currently remains with a saleh. Patient improved substantially through out stay and is stable and ready for discharge.     During this hospital course, patient had a ischemic stroke located in left MCA territory as seen on CT   The stroke etiology is likely secondary to:  [X] Atrial fibrillation  [] Small vessel disease from atherosclerotic risk factors  [] Other  [] Etiology workup still in progress    Patient had the following workup done in house:  CT Brain Stroke Protocol (07.12.22 @ 11:28)     Hyperdense left MCA which is suspicious for thrombus and   acute left MCA territory infarct    MR Head Non Contrast:  from: MR Head No Cont (07.16.22 @ 13:45    Multifocal areas of acute ischemia along the left MCA   territory. Scattered areas of left-sided subarachnoid hemorrhage.    from: CT Angio Neck Stroke Protocol w/ IV Cont (07.12.22 @ 11:26)     Focal area of mild to moderate narrowing involving the origin   of the right vertebral artery. No carotid stenosis.    [] Echo: normal systolic function,   [] Labs: A1c 5.6, LDL 54    Physical exam at discharge:  -Mental status: Awake, alert, oriented to person, place, and time. Speech is fluent with intact naming, repetition, and comprehension, no dysarthria. Recent and remote memory intact. Follows commands. Attention/concentration intact. Fund of knowledge appropriate.  -Cranial nerves:   II: Visual fields are full to confrontation.  III, IV, VI: Extraocular movements are intact without nystagmus. Pupils equally round and reactive to light  V:  Facial sensation V1-V3 equal and intact   VII: Face is symmetric with normal eye closure and smile  VIII: Hearing is bilaterally intact to finger rub  IX, X: Uvula is midline and soft palate rises symmetrically  XI: Head turning and shoulder shrug are intact.  XII: Tongue protrudes midline  Motor: Normal bulk and tone. No pronator drift. Strength bilateral upper extremity 5/5, bilateral lower extremities 5/5.  Rapid alternating movements intact and symmetric  Sensation: Intact to light touch bilaterally. No neglect or extinction on double simultaneous testing.  Coordination: No dysmetria on finger-to-nose and heel-to-shin bilaterally  Reflexes: Downgoing toes bilaterally   Gait: Narrow gait and steady    Discharge NIHSS: 0    New medications on discharge: eliquis, atorvastatin, tamsulosin  Labs to be followed up: none  Imaging to be done as outpatient: none  Further outpatient workup: none

## 2022-07-22 NOTE — DISCHARGE NOTE NURSING/CASE MANAGEMENT/SOCIAL WORK - NSDCCRNAME_GEN_ALL_CORE_FT
[facility] acute rehab at [facility]  [address] [place] [phone]

## 2022-07-22 NOTE — DISCHARGE NOTE NURSING/CASE MANAGEMENT/SOCIAL WORK - NSDCPEFALRISK_GEN_ALL_CORE
For information on Fall & Injury Prevention, visit: [email or web address] OR  [email or web address] OR  https://www.cdc.gov/steadi/patient.html

## 2022-07-22 NOTE — DISCHARGE NOTE PROVIDER - NSDCMRMEDTOKEN_GEN_ALL_CORE_FT
atorvastatin 40 mg oral tablet: 1 tab(s) orally once a day  famotidine 40 mg oral tablet: 1 tab(s) orally once a day (at bedtime)  losartan 100 mg oral tablet: 1 tab(s) orally once a day  metoprolol succinate 25 mg oral tablet, extended release: 0.5 tab(s) orally 2 times a day  nirmatrelvir-ritonavir 150 mg-100 mg (300 mg-100 mg Dose) oral tablet: 3 tab(s) orally 2 times a day  pantoprazole 40 mg oral delayed release tablet: 1 tab(s) orally once a day  rivaroxaban 20 mg oral tablet: 1 tab(s) orally once a day (in the evening)   apixaban 5 mg oral tablet: 1 tab(s) orally every 12 hours  atorvastatin 80 mg oral tablet: 1 tab(s) orally once a day (at bedtime)  donepezil 5 mg oral tablet: 1 tab(s) orally once a day (at bedtime)  famotidine 40 mg oral tablet: 1 tab(s) orally once a day (at bedtime)  losartan 100 mg oral tablet: 1 tab(s) orally once a day  metoprolol succinate 50 mg oral tablet, extended release: 1 tab(s) orally once a day  pantoprazole 40 mg oral delayed release tablet: 1 tab(s) orally once a day  tamsulosin 0.4 mg oral capsule: 1 cap(s) orally once a day (at bedtime)

## 2022-07-22 NOTE — DISCHARGE NOTE PROVIDER - NSDCCPCAREPLAN_GEN_ALL_CORE_FT
PRINCIPAL DISCHARGE DIAGNOSIS  Diagnosis: Acute cerebrovascular accident (CVA)  Assessment and Plan of Treatment: During this hospital admission, you had an ischemic stroke. During an ischemic stroke, blood stops flowing to part of your brain because of a blockage in the blood vessel. This can damage areas in the brain that control other parts of the body.  Please take your eliquis for blood thinning and Atorvastatin for cholesterol medication/blood vessel protection as prescribed to prevent further strokes. Do not skip doses and do not run low on your medication. If you run low on your medication, please contact your doctor.  You will follow up outpatient with the stroke Nurse Practitioner as scheduled below.  Doing your regular tasks may be difficult after you've had a stroke, but you can learn new ways to manage your daily activities. In fact, doing daily activities may help you to regain muscle strength. Be patient, give yourself time to adjust, and appreciate the progress you make. For example, when showering or bathing, test the water temperature with a hand or foot that was not affected by the stroke, use grab bars, a shower seat, a hand-held showerhead, etc. It is normal to feel fatigue after a stroke, while some days may be worse than others, you will continue to improve.  Call 911 right away if you have any of the following symptoms of another stroke:  B: Balance: Sudden: Dizziness, loss of balance, or a sense of falling, difficulty with coordinating movement  E: Eyes: Sudden double vision or trouble seeing in one or both eyes  F: Face: Sudden uneven face  A: Arms (Legs): Sudden weakness, tingling, or loss of feeling on one side of your face or body  S: Speech: Sudden trouble talking or slurred speech, sudden difficulty understanding others  T: Time: Please call 911 right away and go to the emergency room  •Sudden, severe headache  •Blackouts or seizures

## 2022-07-23 VITALS
SYSTOLIC BLOOD PRESSURE: 148 MMHG | DIASTOLIC BLOOD PRESSURE: 85 MMHG | HEART RATE: 76 BPM | RESPIRATION RATE: 18 BRPM | OXYGEN SATURATION: 95 %

## 2022-07-23 LAB
ALBUMIN SERPL ELPH-MCNC: 3.8 G/DL — SIGNIFICANT CHANGE UP (ref 3.3–5)
ALP SERPL-CCNC: 88 U/L — SIGNIFICANT CHANGE UP (ref 40–120)
ALT FLD-CCNC: 90 U/L — HIGH (ref 10–45)
ANION GAP SERPL CALC-SCNC: 9 MMOL/L — SIGNIFICANT CHANGE UP (ref 5–17)
AST SERPL-CCNC: 71 U/L — HIGH (ref 10–40)
BILIRUB SERPL-MCNC: 0.5 MG/DL — SIGNIFICANT CHANGE UP (ref 0.2–1.2)
BUN SERPL-MCNC: 20 MG/DL — SIGNIFICANT CHANGE UP (ref 7–23)
CALCIUM SERPL-MCNC: 9 MG/DL — SIGNIFICANT CHANGE UP (ref 8.4–10.5)
CHLORIDE SERPL-SCNC: 105 MMOL/L — SIGNIFICANT CHANGE UP (ref 96–108)
CO2 SERPL-SCNC: 23 MMOL/L — SIGNIFICANT CHANGE UP (ref 22–31)
CREAT SERPL-MCNC: 1 MG/DL — SIGNIFICANT CHANGE UP (ref 0.5–1.3)
EGFR: 78 ML/MIN/1.73M2 — SIGNIFICANT CHANGE UP
GLUCOSE SERPL-MCNC: 94 MG/DL — SIGNIFICANT CHANGE UP (ref 70–99)
HCT VFR BLD CALC: 39.1 % — SIGNIFICANT CHANGE UP (ref 39–50)
HGB BLD-MCNC: 13.4 G/DL — SIGNIFICANT CHANGE UP (ref 13–17)
MAGNESIUM SERPL-MCNC: 1.8 MG/DL — SIGNIFICANT CHANGE UP (ref 1.6–2.6)
MCHC RBC-ENTMCNC: 31.2 PG — SIGNIFICANT CHANGE UP (ref 27–34)
MCHC RBC-ENTMCNC: 34.3 GM/DL — SIGNIFICANT CHANGE UP (ref 32–36)
MCV RBC AUTO: 90.9 FL — SIGNIFICANT CHANGE UP (ref 80–100)
NRBC # BLD: 0 /100 WBCS — SIGNIFICANT CHANGE UP (ref 0–0)
PLATELET # BLD AUTO: 356 K/UL — SIGNIFICANT CHANGE UP (ref 150–400)
POTASSIUM SERPL-MCNC: 4.1 MMOL/L — SIGNIFICANT CHANGE UP (ref 3.5–5.3)
POTASSIUM SERPL-SCNC: 4.1 MMOL/L — SIGNIFICANT CHANGE UP (ref 3.5–5.3)
PROT SERPL-MCNC: 6.2 G/DL — SIGNIFICANT CHANGE UP (ref 6–8.3)
RBC # BLD: 4.3 M/UL — SIGNIFICANT CHANGE UP (ref 4.2–5.8)
RBC # FLD: 12.5 % — SIGNIFICANT CHANGE UP (ref 10.3–14.5)
SODIUM SERPL-SCNC: 137 MMOL/L — SIGNIFICANT CHANGE UP (ref 135–145)
WBC # BLD: 8.33 K/UL — SIGNIFICANT CHANGE UP (ref 3.8–10.5)
WBC # FLD AUTO: 8.33 K/UL — SIGNIFICANT CHANGE UP (ref 3.8–10.5)

## 2022-07-23 PROCEDURE — 82248 BILIRUBIN DIRECT: CPT

## 2022-07-23 PROCEDURE — 92610 EVALUATE SWALLOWING FUNCTION: CPT

## 2022-07-23 PROCEDURE — 85027 COMPLETE CBC AUTOMATED: CPT

## 2022-07-23 PROCEDURE — 70496 CT ANGIOGRAPHY HEAD: CPT | Mod: MA

## 2022-07-23 PROCEDURE — 82962 GLUCOSE BLOOD TEST: CPT

## 2022-07-23 PROCEDURE — 81001 URINALYSIS AUTO W/SCOPE: CPT

## 2022-07-23 PROCEDURE — 0042T: CPT | Mod: MA

## 2022-07-23 PROCEDURE — P9045: CPT

## 2022-07-23 PROCEDURE — 82803 BLOOD GASES ANY COMBINATION: CPT

## 2022-07-23 PROCEDURE — 80048 BASIC METABOLIC PNL TOTAL CA: CPT

## 2022-07-23 PROCEDURE — 80061 LIPID PANEL: CPT

## 2022-07-23 PROCEDURE — 85610 PROTHROMBIN TIME: CPT

## 2022-07-23 PROCEDURE — 97116 GAIT TRAINING THERAPY: CPT

## 2022-07-23 PROCEDURE — 83605 ASSAY OF LACTIC ACID: CPT

## 2022-07-23 PROCEDURE — 97530 THERAPEUTIC ACTIVITIES: CPT

## 2022-07-23 PROCEDURE — C1769: CPT

## 2022-07-23 PROCEDURE — U0005: CPT

## 2022-07-23 PROCEDURE — 97535 SELF CARE MNGMENT TRAINING: CPT

## 2022-07-23 PROCEDURE — 86850 RBC ANTIBODY SCREEN: CPT

## 2022-07-23 PROCEDURE — 86901 BLOOD TYPING SEROLOGIC RH(D): CPT

## 2022-07-23 PROCEDURE — 84100 ASSAY OF PHOSPHORUS: CPT

## 2022-07-23 PROCEDURE — C1887: CPT

## 2022-07-23 PROCEDURE — 71045 X-RAY EXAM CHEST 1 VIEW: CPT

## 2022-07-23 PROCEDURE — U0003: CPT

## 2022-07-23 PROCEDURE — 97161 PT EVAL LOW COMPLEX 20 MIN: CPT

## 2022-07-23 PROCEDURE — 70498 CT ANGIOGRAPHY NECK: CPT | Mod: MA

## 2022-07-23 PROCEDURE — 85025 COMPLETE CBC W/AUTO DIFF WBC: CPT

## 2022-07-23 PROCEDURE — 93005 ELECTROCARDIOGRAM TRACING: CPT

## 2022-07-23 PROCEDURE — 70551 MRI BRAIN STEM W/O DYE: CPT

## 2022-07-23 PROCEDURE — 86900 BLOOD TYPING SEROLOGIC ABO: CPT

## 2022-07-23 PROCEDURE — 99233 SBSQ HOSP IP/OBS HIGH 50: CPT

## 2022-07-23 PROCEDURE — 97162 PT EVAL MOD COMPLEX 30 MIN: CPT

## 2022-07-23 PROCEDURE — 92523 SPEECH SOUND LANG COMPREHEN: CPT

## 2022-07-23 PROCEDURE — 83036 HEMOGLOBIN GLYCOSYLATED A1C: CPT

## 2022-07-23 PROCEDURE — 93306 TTE W/DOPPLER COMPLETE: CPT

## 2022-07-23 PROCEDURE — 84443 ASSAY THYROID STIM HORMONE: CPT

## 2022-07-23 PROCEDURE — 92507 TX SP LANG VOICE COMM INDIV: CPT

## 2022-07-23 PROCEDURE — C1894: CPT

## 2022-07-23 PROCEDURE — 87635 SARS-COV-2 COVID-19 AMP PRB: CPT

## 2022-07-23 PROCEDURE — 83735 ASSAY OF MAGNESIUM: CPT

## 2022-07-23 PROCEDURE — 85730 THROMBOPLASTIN TIME PARTIAL: CPT

## 2022-07-23 PROCEDURE — 93970 EXTREMITY STUDY: CPT

## 2022-07-23 PROCEDURE — C1760: CPT

## 2022-07-23 PROCEDURE — 70450 CT HEAD/BRAIN W/O DYE: CPT

## 2022-07-23 PROCEDURE — 80053 COMPREHEN METABOLIC PANEL: CPT

## 2022-07-23 PROCEDURE — 99238 HOSP IP/OBS DSCHRG MGMT 30/<: CPT

## 2022-07-23 PROCEDURE — 36415 COLL VENOUS BLD VENIPUNCTURE: CPT

## 2022-07-23 PROCEDURE — 97110 THERAPEUTIC EXERCISES: CPT

## 2022-07-23 PROCEDURE — 84484 ASSAY OF TROPONIN QUANT: CPT

## 2022-07-23 PROCEDURE — 99291 CRITICAL CARE FIRST HOUR: CPT | Mod: 25

## 2022-07-23 RX ORDER — METOPROLOL TARTRATE 50 MG
1 TABLET ORAL
Qty: 0 | Refills: 0 | DISCHARGE
Start: 2022-07-23

## 2022-07-23 RX ORDER — ATORVASTATIN CALCIUM 80 MG/1
1 TABLET, FILM COATED ORAL
Qty: 0 | Refills: 0 | DISCHARGE
Start: 2022-07-23

## 2022-07-23 RX ORDER — METOPROLOL TARTRATE 50 MG
0.5 TABLET ORAL
Qty: 0 | Refills: 0 | DISCHARGE

## 2022-07-23 RX ORDER — DONEPEZIL HYDROCHLORIDE 10 MG/1
1 TABLET, FILM COATED ORAL
Qty: 0 | Refills: 0 | DISCHARGE
Start: 2022-07-23

## 2022-07-23 RX ORDER — TAMSULOSIN HYDROCHLORIDE 0.4 MG/1
1 CAPSULE ORAL
Qty: 0 | Refills: 0 | DISCHARGE
Start: 2022-07-23

## 2022-07-23 RX ORDER — RIVAROXABAN 15 MG-20MG
1 KIT ORAL
Qty: 0 | Refills: 0 | DISCHARGE

## 2022-07-23 RX ORDER — APIXABAN 2.5 MG/1
1 TABLET, FILM COATED ORAL
Qty: 0 | Refills: 0 | DISCHARGE
Start: 2022-07-23

## 2022-07-23 RX ORDER — ATORVASTATIN CALCIUM 80 MG/1
1 TABLET, FILM COATED ORAL
Qty: 0 | Refills: 0 | DISCHARGE

## 2022-07-23 RX ADMIN — Medication 50 MILLIGRAM(S): at 05:58

## 2022-07-23 RX ADMIN — PANTOPRAZOLE SODIUM 40 MILLIGRAM(S): 20 TABLET, DELAYED RELEASE ORAL at 07:34

## 2022-07-23 RX ADMIN — APIXABAN 5 MILLIGRAM(S): 2.5 TABLET, FILM COATED ORAL at 05:58

## 2022-07-23 RX ADMIN — LOSARTAN POTASSIUM 100 MILLIGRAM(S): 100 TABLET, FILM COATED ORAL at 05:59

## 2022-07-23 RX ADMIN — FAMOTIDINE 40 MILLIGRAM(S): 10 INJECTION INTRAVENOUS at 11:45

## 2022-07-23 NOTE — PROGRESS NOTE ADULT - REASON FOR ADMISSION
Left MCA stroke

## 2022-07-23 NOTE — PROGRESS NOTE ADULT - PROVIDER SPECIALTY LIST ADULT
Internal Medicine
Neurology
Neurosurgery
Cardiology
Internal Medicine
Neurology
Neurosurgery
Rehab Medicine
Cardiology
Internal Medicine
NSICU
Neurology
Rehab Medicine
Internal Medicine
Neurology
Neurosurgery
Rehab Medicine
NSICU

## 2022-07-28 DIAGNOSIS — I47.2 VENTRICULAR TACHYCARDIA: ICD-10-CM

## 2022-07-28 DIAGNOSIS — I63.9 CEREBRAL INFARCTION, UNSPECIFIED: ICD-10-CM

## 2022-07-28 DIAGNOSIS — K21.9 GASTRO-ESOPHAGEAL REFLUX DISEASE WITHOUT ESOPHAGITIS: ICD-10-CM

## 2022-07-28 DIAGNOSIS — R47.01 APHASIA: ICD-10-CM

## 2022-07-28 DIAGNOSIS — I48.91 UNSPECIFIED ATRIAL FIBRILLATION: ICD-10-CM

## 2022-07-28 DIAGNOSIS — I63.312 CEREBRAL INFARCTION DUE TO THROMBOSIS OF LEFT MIDDLE CEREBRAL ARTERY: ICD-10-CM

## 2022-07-28 DIAGNOSIS — I67.0 DISSECTION OF CEREBRAL ARTERIES, NONRUPTURED: ICD-10-CM

## 2022-07-28 DIAGNOSIS — R33.9 RETENTION OF URINE, UNSPECIFIED: ICD-10-CM

## 2022-07-28 DIAGNOSIS — R29.810 FACIAL WEAKNESS: ICD-10-CM

## 2022-07-28 DIAGNOSIS — R00.1 BRADYCARDIA, UNSPECIFIED: ICD-10-CM

## 2022-07-28 DIAGNOSIS — Z88.0 ALLERGY STATUS TO PENICILLIN: ICD-10-CM

## 2022-07-28 DIAGNOSIS — I60.12 NONTRAUMATIC SUBARACHNOID HEMORRHAGE FROM LEFT MIDDLE CEREBRAL ARTERY: ICD-10-CM

## 2022-07-28 DIAGNOSIS — Z95.5 PRESENCE OF CORONARY ANGIOPLASTY IMPLANT AND GRAFT: ICD-10-CM

## 2022-07-28 DIAGNOSIS — E78.5 HYPERLIPIDEMIA, UNSPECIFIED: ICD-10-CM

## 2022-07-28 DIAGNOSIS — I25.10 ATHEROSCLEROTIC HEART DISEASE OF NATIVE CORONARY ARTERY WITHOUT ANGINA PECTORIS: ICD-10-CM

## 2022-07-28 DIAGNOSIS — R29.728 NIHSS SCORE 28: ICD-10-CM

## 2022-07-28 DIAGNOSIS — I95.9 HYPOTENSION, UNSPECIFIED: ICD-10-CM

## 2022-07-28 DIAGNOSIS — U07.1 COVID-19: ICD-10-CM

## 2022-07-28 DIAGNOSIS — Z79.01 LONG TERM (CURRENT) USE OF ANTICOAGULANTS: ICD-10-CM

## 2022-07-28 DIAGNOSIS — I10 ESSENTIAL (PRIMARY) HYPERTENSION: ICD-10-CM

## 2022-07-28 DIAGNOSIS — G81.91 HEMIPLEGIA, UNSPECIFIED AFFECTING RIGHT DOMINANT SIDE: ICD-10-CM

## 2022-08-01 PROBLEM — E78.5 HYPERLIPIDEMIA, UNSPECIFIED: Chronic | Status: ACTIVE | Noted: 2022-07-12

## 2022-08-01 PROBLEM — I10 ESSENTIAL (PRIMARY) HYPERTENSION: Chronic | Status: ACTIVE | Noted: 2022-07-12

## 2022-08-01 PROBLEM — I25.10 ATHEROSCLEROTIC HEART DISEASE OF NATIVE CORONARY ARTERY WITHOUT ANGINA PECTORIS: Chronic | Status: ACTIVE | Noted: 2022-07-12

## 2022-08-01 PROBLEM — I48.91 UNSPECIFIED ATRIAL FIBRILLATION: Chronic | Status: ACTIVE | Noted: 2022-07-12

## 2022-08-01 PROBLEM — K21.9 GASTRO-ESOPHAGEAL REFLUX DISEASE WITHOUT ESOPHAGITIS: Chronic | Status: ACTIVE | Noted: 2022-07-12

## 2022-08-11 ENCOUNTER — APPOINTMENT (OUTPATIENT)
Dept: NEUROLOGY | Facility: CLINIC | Age: 77
End: 2022-08-11

## 2022-08-11 VITALS
TEMPERATURE: 97.7 F | DIASTOLIC BLOOD PRESSURE: 85 MMHG | WEIGHT: 170 LBS | SYSTOLIC BLOOD PRESSURE: 148 MMHG | HEART RATE: 67 BPM | OXYGEN SATURATION: 97 % | HEIGHT: 69 IN | BODY MASS INDEX: 25.18 KG/M2

## 2022-08-11 PROCEDURE — 99215 OFFICE O/P EST HI 40 MIN: CPT

## 2022-08-11 RX ORDER — LOSARTAN POTASSIUM 100 MG/1
100 TABLET, FILM COATED ORAL
Qty: 30 | Refills: 0 | Status: ACTIVE | COMMUNITY
Start: 2022-07-27

## 2022-08-11 RX ORDER — NIRMATRELVIR AND RITONAVIR 300-100 MG
20 X 150 MG & KIT ORAL
Qty: 30 | Refills: 0 | Status: DISCONTINUED | COMMUNITY
Start: 2022-07-08 | End: 2022-08-11

## 2022-08-11 RX ORDER — TAMSULOSIN HYDROCHLORIDE 0.4 MG/1
0.4 CAPSULE ORAL
Qty: 30 | Refills: 0 | Status: ACTIVE | COMMUNITY
Start: 2022-07-27

## 2022-08-11 RX ORDER — RIVAROXABAN 2.5 MG/1
TABLET, FILM COATED ORAL
Refills: 0 | Status: DISCONTINUED | COMMUNITY
End: 2022-08-11

## 2022-08-11 RX ORDER — METOPROLOL SUCCINATE 50 MG/1
50 TABLET, EXTENDED RELEASE ORAL
Qty: 30 | Refills: 0 | Status: ACTIVE | COMMUNITY
Start: 2022-07-27

## 2022-08-11 NOTE — ASSESSMENT
[FreeTextEntry1] : Pt is a 78yo Male with PMHx of Afib (on Xarelto), CVD (mid LAD, mid RCA, OM1, OM2, OM3 stents [id number]), HTN, HLD, GERD, and COVID infection 7/8/22 (tx w/ Paxlovid), POOANM (untreated) who presents today for hospital f/u visit s/p acute Left MCA stroke s/p thrombectomy i/s/o afib off xarelto x2 weeks while being treated with paxlovid for active covid. Stroke etiology more likely due to being off xarelto when patient was on paxlovid, vs hypercoagulable state from Covid infection. 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 \par \par Plan: \par C/w Eliquis 5mg BID with no missed any doses. Continue to monitor for sx of bleeding, especially since he is also on ASA per his cards recs. \par C/w high dose statin. Monitor for any myalgias. Goal LDL <70. \par C/w close cards followup for management of CAD, afib\par Refer to sleep medicine for management of POONAM. Discussed role of untreated POONAM in stroke and cardiac disease \par COunselled on BEFAST/ signs of stroke, and to go to nearest ED immediately if any. \par RTO 1 month\par \par

## 2022-08-11 NOTE — PHYSICAL EXAM
[FreeTextEntry1] : Mental status: Awake, alert, oriented to person, place, and time. Speech is fluent with intact naming, repetition, and comprehension, no dysarthria. Recent and remote memory intact. Follows commands. Attention/concentration intact. Fund of knowledge appropriate. \par -Cranial nerves: \par II: Visual fields are full to confrontation. \par III, IV, VI: Extraocular movements are intact without nystagmus. Pupils equally round and reactive to light \par V:  Facial sensation V1-V3 equal and intact \par VII: Face is symmetric with normal eye closure and smile \par VIII: Hearing is bilaterally intact to finger rub \par IX, X: Uvula is midline and soft palate rises symmetrically \par XI: Head turning and shoulder shrug are intact. \par XII: Tongue protrudes midline \par Motor: Normal bulk and tone. No pronator drift. Strength bilateral upper extremity 5/5, bilateral lower extremities 5/5. \par Rapid alternating movements intact and symmetric \par Sensation: Intact to light touch bilaterally. No neglect or extinction on double simultaneous testing. \par Coordination: No dysmetria on finger-to-nose and heel-to-shin bilaterally \par Reflexes: Downgoing toes bilaterally \par Gait: Narrow gait and steady

## 2022-08-11 NOTE — HISTORY OF PRESENT ILLNESS
[FreeTextEntry1] : Pt is a 76yo Male with PMHx of Afib (on Xarelto), CVD (mid LAD, mid RCA, OM1, OM2, OM3 stents [id number]), HTN, HLD, GERD, and COVID infection 7/8/22 (tx w/ Paxlovid), POONAM (untreated) who presents today for hospital f/u visit. He is accompanied by his wife who is helping to provide history. He initially presented to ED on 7/12/22 by EMS with L gaze, aphasia, R sided plegia. CTH showed hyperdense L MCA sign, confirmed on CTA. CTP showed significant mismatch. Patient was not a tpa candidate as he had likely taken Xeralto within 3 hours prior to arrival. S/p thrombectomy TICI 0 to 3. Of note, L ICA dissection (non-occlusive) was noted, (likely iatrogenic). Course was complicated by SAH post thrombectomy. MRI confirmed SAH and multifocal areas of ischemia along left MCA territory.  Repeat scans showed stable/ improvement of bleed and normal evolution of ischemia, and his AC was restarted-  he was switched from xarelto to eliquis. Echo showed normal systolic function, mildly dilated L atrium.  Stroke labs included A1c 5.6, LDL 54. During his stay, patient also had difficulty voiding. He failed TOV 2 times. D/c home with a saleh. Patient improved substantially through out stay. \par \par SInce d/c patient has been doing well. He denies any residual deficit from stroke and denies any new or recurrent neurological symptoms. He is taking eliquis twice daily as prescribed with no missed doses. He has been following up with his cardiologist and EP for Afib/ CAD. He was restarted on ASA per cards for his hx of multiple stents. He denies any bleeding concerns on eliquis/ ASA combo. He is taking atova 80 with no myalgias. His BP remains well-controlled. \par \par His wife requests referral for sleep specialist for mod POONAM that is not current being treated.

## 2022-08-25 ENCOUNTER — NON-APPOINTMENT (OUTPATIENT)
Age: 77
End: 2022-08-25

## 2022-08-31 ENCOUNTER — APPOINTMENT (OUTPATIENT)
Dept: PULMONOLOGY | Facility: CLINIC | Age: 77
End: 2022-08-31

## 2022-08-31 VITALS
SYSTOLIC BLOOD PRESSURE: 138 MMHG | OXYGEN SATURATION: 95 % | WEIGHT: 175 LBS | HEIGHT: 69 IN | TEMPERATURE: 97.2 F | BODY MASS INDEX: 25.92 KG/M2 | HEART RATE: 68 BPM | DIASTOLIC BLOOD PRESSURE: 76 MMHG

## 2022-08-31 DIAGNOSIS — G47.33 OBSTRUCTIVE SLEEP APNEA (ADULT) (PEDIATRIC): ICD-10-CM

## 2022-08-31 PROCEDURE — 99204 OFFICE O/P NEW MOD 45 MIN: CPT

## 2022-08-31 NOTE — ASSESSMENT
[FreeTextEntry1] : moderate obstructive sleep apnea by hx, although only mild snoring and  daytime somnolence.  The patient is advised that in addition to worsening sleep leading to daytime sleepiness, sleep apnea, at least when severe, may be associated with worsening hypertension and diabetes, and may be a risk factor for cardiovascular disease and stroke. \par Based on history and physical exam, sleep disordered breathing is at least moderately likely.  The patient was advised to have overnight polysomnography, and will be seen in follow up after testing.

## 2022-08-31 NOTE — HISTORY OF PRESENT ILLNESS
[FreeTextEntry1] : Initial visit for this 77-year-old gentleman for treatment of sleep disordered breathing. In early July he had a stroke in the middle cerebral artery distribution which he has mostly recovered from. He has had a sleep study in the past and told of moderate sleep-disordered breathing a few years ago. He has a CPAP she and but was never comfortable using it. With a recent diagnosis of stroke is more concerned about treating this possible disease. He generally goes to bed 11 PM, falsely quickly, gets out of bed at 5 AM after one or 2 awakenings. His wife tells me that his snoring is mild or moderate. She has not observed apnea. He does not awaken with a headache. On Saturdays he may nap for about 2 hours otherwise not. She does describe at least mild daytime sleepiness, particularly in the evenings watching television. [employer] Sleepiness Scale is 6.

## 2022-08-31 NOTE — REVIEW OF SYSTEMS
[Recent Wt Loss (___ Lbs)] : recent [unfilled] ~Ulb weight loss [Nasal Congestion] : no nasal congestion [Snoring] : snoring [Postnasal Drip] : no postnasal drip [Witnessed Apneas] : no witnessed apnea [Shortness Of Breath] : no shortness of breath [A.M. Dry Mouth] : no a.m. dry mouth [Negative] : Cardiovascular

## 2022-08-31 NOTE — PHYSICAL EXAM
[General Appearance - Well Developed] : well developed [Normal Appearance] : normal appearance [Well Groomed] : well groomed [General Appearance - Well Nourished] : well nourished [No Deformities] : no deformities [General Appearance - In No Acute Distress] : no acute distress [Normal Conjunctiva] : the conjunctiva exhibited no abnormalities [Eyelids - No Xanthelasma] : the eyelids demonstrated no xanthelasmas [Normal Oropharynx] : normal oropharynx [II] : II [Heart Rate And Rhythm] : heart rate was normal and rhythm regular [Heart Sounds] : normal S1 and S2 [Heart Sounds Gallop] : no gallops [Murmurs] : no murmurs [Heart Sounds Pericardial Friction Rub] : no pericardial rub [] : no respiratory distress [Auscultation Breath Sounds / Voice Sounds] : lungs were clear to auscultation bilaterally [Nail Clubbing] : no clubbing  or cyanosis of the fingernails [Musculoskeletal - Swelling] : no joint swelling seen [Motor Tone] : muscle strength and tone were normal [Sensation] : the sensory exam was normal to light touch and pinprick [Motor Exam] : the motor exam was normal [FreeTextEntry1] : mild expressive aphasia (word finding) [Affect] : the affect was normal [Mood] : the mood was normal

## 2022-09-06 ENCOUNTER — OUTPATIENT (OUTPATIENT)
Dept: OUTPATIENT SERVICES | Facility: HOSPITAL | Age: 77
LOS: 1 days | End: 2022-09-06
Payer: MEDICARE

## 2022-09-06 ENCOUNTER — APPOINTMENT (OUTPATIENT)
Dept: SLEEP CENTER | Facility: HOSPITAL | Age: 77
End: 2022-09-06

## 2022-09-06 DIAGNOSIS — Z95.5 PRESENCE OF CORONARY ANGIOPLASTY IMPLANT AND GRAFT: Chronic | ICD-10-CM

## 2022-09-06 DIAGNOSIS — Z98.890 OTHER SPECIFIED POSTPROCEDURAL STATES: Chronic | ICD-10-CM

## 2022-09-06 DIAGNOSIS — Z95.9 PRESENCE OF CARDIAC AND VASCULAR IMPLANT AND GRAFT, UNSPECIFIED: Chronic | ICD-10-CM

## 2022-09-06 DIAGNOSIS — G47.33 OBSTRUCTIVE SLEEP APNEA (ADULT) (PEDIATRIC): ICD-10-CM

## 2022-09-06 PROCEDURE — 95810 POLYSOM 6/> YRS 4/> PARAM: CPT | Mod: 26

## 2022-09-06 PROCEDURE — 95810 POLYSOM 6/> YRS 4/> PARAM: CPT

## 2022-09-30 ENCOUNTER — APPOINTMENT (OUTPATIENT)
Dept: PULMONOLOGY | Facility: CLINIC | Age: 77
End: 2022-09-30

## 2022-09-30 DIAGNOSIS — G47.31 PRIMARY CENTRAL SLEEP APNEA: ICD-10-CM

## 2022-09-30 PROCEDURE — 99214 OFFICE O/P EST MOD 30 MIN: CPT | Mod: 95

## 2022-09-30 NOTE — ASSESSMENT
[FreeTextEntry1] : Central sleep apnea\par This may be related to his previous history of stroke.  Treatment for central sleep apnea remains controversial.  In this patient without significant oxygen desaturation and relatively mild disease I do not think treatment is required.  For now I would like to follow him conservatively and I have asked him to make an appointment to see me again in a few months.

## 2022-09-30 NOTE — HISTORY OF PRESENT ILLNESS
[Home] : at home, [unfilled] , at the time of the visit. [Medical Office: ([facility])___] : at the medical office located in  [FreeTextEntry1] : 8/31/22:  Initial visit for this 77-year-old gentleman for treatment of sleep disordered breathing. In early July he had a stroke in the middle cerebral artery distribution which he has mostly recovered from. He has had a sleep study in the past and told of moderate sleep-disordered breathing a few years ago. He has a CPAP she and but was never comfortable using it. With a recent diagnosis of stroke is more concerned about treating this possible disease. He generally goes to bed 11 PM, falsely quickly, gets out of bed at 5 AM after one or 2 awakenings. His wife tells me that his snoring is mild or moderate. She has not observed apnea. He does not awaken with a headache. On Saturdays he may nap for about 2 hours otherwise not. She does describe at least mild daytime sleepiness, particularly in the evenings watching television. [place] Sleepiness Scale is 6.\par \par \par 9/30/22: Since last seen had polysomnogram on September 6, 2022.  His sleep was fragmented but adequate for evaluation.  He had mild to moderate sleep disordered breathing, with an apnea-hypopnea index of 14.6 (4% criteria).  Most events were central apneas, and he had minimal oxygen desaturation.\par

## 2022-09-30 NOTE — PHYSICAL EXAM
[General Appearance - Well Developed] : well developed [Normal Appearance] : normal appearance [Well Groomed] : well groomed [General Appearance - Well Nourished] : well nourished [No Deformities] : no deformities [General Appearance - In No Acute Distress] : no acute distress [] : no respiratory distress [Oriented To Time, Place, And Person] : oriented to person, place, and time [Impaired Insight] : insight and judgment were intact [Affect] : the affect was normal

## 2022-11-02 NOTE — PHYSICAL THERAPY INITIAL EVALUATION ADULT - REHAB POTENTIAL, PT EVAL
The patient is a 39y Female complaining of shoulder pain/injury. good, to achieve stated therapy goals

## 2022-11-07 DIAGNOSIS — Z01.812 ENCOUNTER FOR PREPROCEDURAL LABORATORY EXAMINATION: ICD-10-CM

## 2022-11-08 ENCOUNTER — NON-APPOINTMENT (OUTPATIENT)
Age: 77
End: 2022-11-08

## 2022-11-08 ENCOUNTER — APPOINTMENT (OUTPATIENT)
Dept: NEUROSURGERY | Facility: CLINIC | Age: 77
End: 2022-11-08

## 2022-11-08 VITALS
TEMPERATURE: 96.2 F | BODY MASS INDEX: 25.18 KG/M2 | OXYGEN SATURATION: 98 % | WEIGHT: 170 LBS | DIASTOLIC BLOOD PRESSURE: 89 MMHG | SYSTOLIC BLOOD PRESSURE: 153 MMHG | HEIGHT: 69 IN | RESPIRATION RATE: 17 BRPM | HEART RATE: 81 BPM

## 2022-11-08 PROCEDURE — 99215 OFFICE O/P EST HI 40 MIN: CPT

## 2022-11-09 ENCOUNTER — OUTPATIENT (OUTPATIENT)
Dept: OUTPATIENT SERVICES | Facility: HOSPITAL | Age: 77
LOS: 1 days | End: 2022-11-09
Payer: MEDICARE

## 2022-11-09 ENCOUNTER — APPOINTMENT (OUTPATIENT)
Dept: CT IMAGING | Facility: HOSPITAL | Age: 77
End: 2022-11-09

## 2022-11-09 DIAGNOSIS — Z98.890 OTHER SPECIFIED POSTPROCEDURAL STATES: Chronic | ICD-10-CM

## 2022-11-09 DIAGNOSIS — Z95.9 PRESENCE OF CARDIAC AND VASCULAR IMPLANT AND GRAFT, UNSPECIFIED: Chronic | ICD-10-CM

## 2022-11-09 DIAGNOSIS — Z95.5 PRESENCE OF CORONARY ANGIOPLASTY IMPLANT AND GRAFT: Chronic | ICD-10-CM

## 2022-11-09 PROCEDURE — 70498 CT ANGIOGRAPHY NECK: CPT

## 2022-11-09 PROCEDURE — 82565 ASSAY OF CREATININE: CPT

## 2022-11-09 PROCEDURE — 70496 CT ANGIOGRAPHY HEAD: CPT

## 2022-11-09 PROCEDURE — 70496 CT ANGIOGRAPHY HEAD: CPT | Mod: 26,MH

## 2022-11-09 PROCEDURE — 70498 CT ANGIOGRAPHY NECK: CPT | Mod: 26,MH

## 2022-11-09 NOTE — ASSESSMENT
[FreeTextEntry1] : Mr. Monge is doing well s/p mechanical thrombectomy for L MCA occlusion on 7/12/22. He should continue eliquis and should continue daily aspirin and statin therapy with aim for goal LDL <70 and HbA1c <6.0 for stroke prevention. The patient should follow up with Stroke Neurology as scheduled. We will obtain a CTA head and neck to follow up his left ICA dissection and will call him with the results and further plan.  We discussed the potential need for hematology consultation for formal hypercoagulability profile given elevated Lipoprotein on recent labwork by his cardiologist.  The patient understands the plan of care and is in agreement.  All questions answered to patient satisfaction.\par

## 2022-11-09 NOTE — REASON FOR VISIT
[FreeTextEntry1] : GHADA LOWE is a 77 year male with a PMH of Afib on Xarelto (off for 2 weeks prior to his stroke), CAD s/p stents, HTN, HLD, GERD, and Covid infection 7/8/22 treated with Paxlovid who presents to the office today for neuroendovascular followup of recent hospitalization at [facility] from 7/12/22-7/23/22  due to left MCA CVA .  He is s/p left mechanical thrombectomy s/p TICI 3 reperfusion.  He was not a TPA candidate due to Eliquis use.  He was discovered to have a left ICA dissection but etiology of stroke was felt to be cardioembolic from his Afib.  He developed a small hemorrhagic transformation of MCA infarcts, and was ultimately restarted on his xarelto.  He saw Dr. Lewis in 8/2022 and was started on ASA 81mg for his cardiac stent and changed to Eliquis 5mg bid for Afib. He remains on high dose statin.  NIHSS on discharge was 0. He has residual mild right UE weakness/clumsiness and very rare word substitutions.  \par The patient denies headaches, visual change, numbness, weakness of extremities, speech disturbance, dizziness, neck pain, vertigo.\par Surg Hx: none\par Meds: eliquis, Atorvastatin 80mg, Donepezil, famotidine, losartan, pantoprazole, tamsulosin\par Allergies: NKDA\par Soc Hx: non smoker, occasional EtOH, lives with wife\par

## 2022-11-11 LAB
ANION GAP SERPL CALC-SCNC: 13 MMOL/L
BUN SERPL-MCNC: 20 MG/DL
CALCIUM SERPL-MCNC: 10.2 MG/DL
CHLORIDE SERPL-SCNC: 105 MMOL/L
CO2 SERPL-SCNC: 26 MMOL/L
CREAT SERPL-MCNC: 1.18 MG/DL
EGFR: 63 ML/MIN/1.73M2
GLUCOSE SERPL-MCNC: 99 MG/DL
POTASSIUM SERPL-SCNC: 4.3 MMOL/L
SODIUM SERPL-SCNC: 144 MMOL/L

## 2022-11-15 ENCOUNTER — APPOINTMENT (OUTPATIENT)
Dept: HEMATOLOGY ONCOLOGY | Facility: CLINIC | Age: 77
End: 2022-11-15

## 2022-11-15 ENCOUNTER — TRANSCRIPTION ENCOUNTER (OUTPATIENT)
Age: 77
End: 2022-11-15

## 2022-11-15 ENCOUNTER — LABORATORY RESULT (OUTPATIENT)
Age: 77
End: 2022-11-15

## 2022-11-15 VITALS
HEART RATE: 66 BPM | HEIGHT: 69 IN | OXYGEN SATURATION: 98 % | TEMPERATURE: 96.8 F | BODY MASS INDEX: 25.77 KG/M2 | WEIGHT: 174 LBS | SYSTOLIC BLOOD PRESSURE: 143 MMHG | DIASTOLIC BLOOD PRESSURE: 77 MMHG

## 2022-11-15 DIAGNOSIS — U07.1 COVID-19: ICD-10-CM

## 2022-11-15 DIAGNOSIS — Z80.9 FAMILY HISTORY OF MALIGNANT NEOPLASM, UNSPECIFIED: ICD-10-CM

## 2022-11-15 DIAGNOSIS — Z82.49 FAMILY HISTORY OF ISCHEMIC HEART DISEASE AND OTHER DISEASES OF THE CIRCULATORY SYSTEM: ICD-10-CM

## 2022-11-15 DIAGNOSIS — I77.71 DISSECTION OF CAROTID ARTERY: ICD-10-CM

## 2022-11-15 PROCEDURE — 99204 OFFICE O/P NEW MOD 45 MIN: CPT | Mod: CS,25

## 2022-11-15 PROCEDURE — 36415 COLL VENOUS BLD VENIPUNCTURE: CPT

## 2022-11-15 RX ORDER — DONEPEZIL HYDROCHLORIDE 5 MG/1
5 TABLET ORAL
Qty: 30 | Refills: 0 | Status: COMPLETED | COMMUNITY
Start: 2022-07-27 | End: 2022-11-15

## 2022-11-15 RX ORDER — TADALAFIL 20 MG/1
20 TABLET ORAL
Qty: 10 | Refills: 0 | Status: COMPLETED | COMMUNITY
Start: 2022-08-02 | End: 2022-11-15

## 2022-11-15 RX ORDER — METOPROLOL SUCCINATE 25 MG/1
25 TABLET, EXTENDED RELEASE ORAL
Qty: 90 | Refills: 0 | Status: COMPLETED | COMMUNITY
Start: 2022-02-11 | End: 2022-11-15

## 2022-11-15 NOTE — HISTORY OF PRESENT ILLNESS
[de-identified] : 77 years old white male, had a recent stroke in proximity to a COVID infection... He was placed on Paxil bid and the Xarelto was stopped... Patient has history of atrial fibrillation s/p ablation 1 year ago, however he was continued on Xarelto in spite of the ablation... He had a successful thrombectomy by Dr. Lunsford...\par \par Dr. Mathews  his cardiologist found him to have elevated lipoprotein A blood work done September 6... Rest of the blood work including CBC CMP PSA vitamin D and vitamin B12 were all within normal limits... Patient will try to email us the results.\par \par Patient denies previous history of thrombosis... No family history of thrombosis..

## 2022-11-15 NOTE — ASSESSMENT
[FreeTextEntry1] : Discussed with patient his stroke...\par \par Which could have been related to no Xarelto, or to the  COVID infection...\par \par I will do a mini hypercoagulable work-up, and make my recommendation for anticoagulation afterwards...

## 2022-11-23 ENCOUNTER — APPOINTMENT (OUTPATIENT)
Dept: HEMATOLOGY ONCOLOGY | Facility: CLINIC | Age: 77
End: 2022-11-23

## 2022-11-23 VITALS
TEMPERATURE: 97.4 F | BODY MASS INDEX: 25.92 KG/M2 | DIASTOLIC BLOOD PRESSURE: 92 MMHG | HEART RATE: 69 BPM | HEIGHT: 69 IN | SYSTOLIC BLOOD PRESSURE: 144 MMHG | OXYGEN SATURATION: 98 % | WEIGHT: 175 LBS

## 2022-11-23 LAB
APTT 2H P 1:4 NP PPP: NORMAL
APTT 2H P INC PPP: NORMAL
APTT IMM NP/PRE NP PPP: NORMAL
APTT INV RATIO PPP: 33 SEC
B2 GLYCOPROT1 IGG SER-ACNC: <5 SGU
B2 GLYCOPROT1 IGM SER-ACNC: <5 SMU
CARDIOLIPIN AB SER IA-ACNC: NEGATIVE
CONFIRM: 68.1 SEC
DEPRECATED D DIMER PPP IA-ACNC: 629 NG/ML DDU
DRVVT 1H NP PPP: 48.6 SEC
DRVVT IMM 1:2 NP PPP: NORMAL
DRVVT SCREEN TO CONFIRM RATIO: 0.72 RATIO
NPP NORMAL POOLED PLASMA: NORMAL SECS
SCREEN DRVVT: 58.5 SEC
SILICA CLOTTING TIME INTERPRETATION: NORMAL
SILICA CLOTTING TIME S/C: 0.73 RATIO

## 2022-11-23 PROCEDURE — 99213 OFFICE O/P EST LOW 20 MIN: CPT

## 2022-11-23 RX ORDER — APIXABAN 5 MG/1
5 TABLET, FILM COATED ORAL
Qty: 60 | Refills: 5 | Status: ACTIVE | COMMUNITY
Start: 2022-07-27 | End: 1900-01-01

## 2022-11-23 NOTE — HISTORY OF PRESENT ILLNESS
[de-identified] : 77 years old white male, had a recent stroke in proximity to a COVID infection... He was placed on Paxil bid and the Xarelto was stopped... Patient has history of atrial fibrillation s/p ablation 1 year ago, however he was continued on Xarelto in spite of the ablation... He had a successful thrombectomy by Dr. Lunsford...\par \par Dr. Mathews  his cardiologist found him to have elevated lipoprotein A blood work done September 6... Rest of the blood work including CBC CMP PSA vitamin D and vitamin B12 were all within normal limits... Patient will try to email us the results.\par \par Patient denies previous history of thrombosis... No family history of thrombosis.. [de-identified] : November 23, 2022 returns for follow-up... So his results, tolerating the statin elevated D-dimers... Patient is reluctant to get off anticoagulation...

## 2022-11-23 NOTE — ASSESSMENT
[FreeTextEntry1] : Discussed with patient his stroke... Since his D-dimers remain elevated patient will continue with anticoagulation for now... Prescription refilled...\par \par I left a long voice message for Dr. Unger patient's cardiologist...\par \par I also reached out to Dr. Lunsford's assistant...\par \par Patient to see us in follow-up end of January.\par \par

## 2022-12-27 ENCOUNTER — APPOINTMENT (OUTPATIENT)
Dept: PHARMACY | Facility: CLINIC | Age: 77
End: 2022-12-27

## 2022-12-27 ENCOUNTER — APPOINTMENT (OUTPATIENT)
Dept: OTOLARYNGOLOGY | Facility: CLINIC | Age: 77
End: 2022-12-27

## 2022-12-27 PROCEDURE — 92557 COMPREHENSIVE HEARING TEST: CPT

## 2022-12-27 PROCEDURE — V5299A: CUSTOM | Mod: NC

## 2022-12-27 PROCEDURE — 92550 TYMPANOMETRY & REFLEX THRESH: CPT

## 2023-01-04 NOTE — ED ADULT NURSE NOTE - BEFAST ARM SIDE DRIFT
Yes Mirvaso Counseling: Mirvaso is a topical medication which can decrease superficial blood flow where applied. Side effects are uncommon and include stinging, redness and allergic reactions.

## 2023-01-23 ENCOUNTER — APPOINTMENT (OUTPATIENT)
Dept: NEUROLOGY | Facility: CLINIC | Age: 78
End: 2023-01-23

## 2023-01-26 NOTE — ED PROVIDER NOTE - PATIENT PORTAL LINK FT
I put in a referral for it they did not provide a CPT but she had an infusion done before. It may have been a little different    You can access the FollowMyHealth Patient Portal offered by [facility] by registering at the following website: [email or web address]. By joining [facility]’s FollowMyHealth portal, you will also be able to view your health information using other applications (apps) compatible with our system.

## 2023-01-30 ENCOUNTER — APPOINTMENT (OUTPATIENT)
Dept: NEUROLOGY | Facility: CLINIC | Age: 78
End: 2023-01-30
Payer: MEDICARE

## 2023-01-30 VITALS
OXYGEN SATURATION: 98 % | BODY MASS INDEX: 25.77 KG/M2 | HEIGHT: 69 IN | TEMPERATURE: 97.3 F | SYSTOLIC BLOOD PRESSURE: 149 MMHG | HEART RATE: 65 BPM | WEIGHT: 174 LBS | DIASTOLIC BLOOD PRESSURE: 88 MMHG

## 2023-01-30 DIAGNOSIS — I63.512 CEREBRAL INFARCTION DUE TO UNSPECIFIED OCCLUSION OR STENOSIS OF LEFT MIDDLE CEREBRAL ARTERY: ICD-10-CM

## 2023-01-30 PROCEDURE — 99213 OFFICE O/P EST LOW 20 MIN: CPT

## 2023-03-30 NOTE — STROKE CODE NOTE - IV ALTEPLASE ADMIN OUTSIDE HIDDEN
Patient calling for prior authorization on:    Medication: Linzess  Dosage: 72 mcg Cap  Quantity requested:  60 (to be taken BID  Pharmacy for prescription has been selected.    Initiation of prior authorization needed.   show

## 2023-04-12 RX ORDER — FAMOTIDINE 40 MG/1
40 TABLET, FILM COATED ORAL
Qty: 30 | Refills: 2 | Status: ACTIVE | COMMUNITY
Start: 2022-01-21 | End: 1900-01-01

## 2023-05-10 ENCOUNTER — APPOINTMENT (OUTPATIENT)
Dept: CT IMAGING | Facility: CLINIC | Age: 78
End: 2023-05-10
Payer: MEDICARE

## 2023-05-10 PROCEDURE — 70496 CT ANGIOGRAPHY HEAD: CPT

## 2023-05-10 PROCEDURE — 70498 CT ANGIOGRAPHY NECK: CPT

## 2023-05-11 ENCOUNTER — APPOINTMENT (OUTPATIENT)
Dept: NEUROSURGERY | Facility: CLINIC | Age: 78
End: 2023-05-11
Payer: MEDICARE

## 2023-05-11 PROCEDURE — 99203 OFFICE O/P NEW LOW 30 MIN: CPT | Mod: 95

## 2023-05-11 NOTE — ASSESSMENT
[FreeTextEntry1] : Mr. Monge is doing well s/p mechanical thrombectomy for L MCA occlusion on 7/12/22. He should continue eliquis for his afib and should continue daily asa for his dissection/pseudoaneurysm of the L ICA and statin therapy with aim for goal LDL <70 and HbA1c <6.0 for stroke prevention. The patient should follow up with Stroke Neurology as scheduled. His pseudoaneurysm and dissection does not appear to be significantly changed to my eye.  We will obtain a CTA head and neck to follow up his left ICA dissection/pseudoaneurysm in 1 year. He should continue his eliquis/asa/praluent as prescribed.  The patient understands the plan of care and is in agreement. All questions answered to patient satisfaction.\par

## 2023-05-11 NOTE — REASON FOR VISIT
[FreeTextEntry1] : The patient has given verbal consent for this telehealth visit using two-way audio and video technology.  The patient is currently located at home [address]\[place] [address], and I am located at my office at [facility].\par \par Mr. Lowe returns today for follow up of his left ICA dissection and left MCA CVA s/p thrombectomy, etiology of stroke felt to be due to afib, switched from xarelto to eliquis.  He has been followed by Dr. Tilley for elevated Ddimers in the setting of Covid and lipoprotein A.  He has seen Dr. Mondragon/Stroke Neurology in January and planned to continue Eliquis for Afib. He has made continued progress with both his fluency of speech and motor weakness/clumsiness.  He has had no new symptoms since his hospitalization. 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  \par \par 5/11/23: GHADA LOWE is a 77 year male with a PMH of Afib on Xarelto (off for 2 weeks prior to his stroke), CAD s/p stents, HTN, HLD, GERD, and Covid infection 7/8/22 treated with Paxlovid who presents to the office today for neuroendovascular followup of recent hospitalization at [facility] from 7/12/22-7/23/22 due to left MCA CVA . He is s/p left mechanical thrombectomy s/p TICI 3 reperfusion. He was not a TPA candidate due to Eliquis use. He was discovered to have a left ICA dissection but etiology of stroke was felt to be cardioembolic from his Afib. He developed a small hemorrhagic transformation of MCA infarcts, and was ultimately restarted on his xarelto. He saw Dr. Lewis in 8/2022 and was started on ASA 81mg for his cardiac stent and changed to Eliquis 5mg bid for Afib. He remains on high dose statin. NIHSS on discharge was 0. He has residual mild right UE weakness/clumsiness and very rare word substitutions. \par The patient denies headaches, visual change, numbness, weakness of extremities, speech disturbance, dizziness, neck pain, vertigo.\par Surg Hx: none\par Meds: eliquis, Atorvastatin 80mg, Donepezil, famotidine, losartan, pantoprazole, tamsulosin\par Allergies: NKDA\par Soc Hx: non smoker, occasional EtOH, lives with wife

## 2023-05-11 NOTE — PHYSICAL EXAM
[General Appearance - Alert] : alert [General Appearance - In No Acute Distress] : in no acute distress [Fluency] : fluency intact [Comprehension] : comprehension intact [FreeTextEntry1] : s [FreeTextEntry5] : face grossly symmetrical [FreeTextEntry6] : antigravity x 4 extremities

## 2023-05-11 NOTE — DATA REVIEWED
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 20 cc were discarded.\par \par Comparison is made with the prior CTA of 11/9/2022.\par \par There is calcification along the undersurface of the aortic arch. The origins of the carotid and vertebral arteries are normal. The left vertebral artery is dominant. The carotid bifurcations are normal bilaterally. There is irregularity of the distal cervical internal carotid artery 2.5 cm from the skull base with there is mild dilatation of the internal carotid artery consistent with a pseudoaneurysm. Although the pseudoaneurysm appearance itself looks similar, 1.3 cm proximal to the pseudoaneurysm the carotid appears to be more irregular with a focal area of narrowing followed by vessel dilatation consistent with a caliber change since the prior examination.\par \par The distal vertebral arteries well identified as are the posterior-inferior cerebellar arteries bilaterally. The region of the vertebral basilar junction is normal. The basilar artery is normal. The posterior cerebral and superior cerebellar arteries are normal.\par \par Evaluation of the carotid arteries demonstrate normal appearance to the the cavernous, petrous and supraclinoid internal carotid arteries are normal. The anterior cerebral arteries anterior communicating artery and middle cerebral arteries are normal.\par \par Bilateral posterior communicating arteries supply the posterior cerebral arteries as there are hypoplastic P1 segments.\par \par The normal intracranial venous circulation is identified. The left transverse sinus is dominant. The superior sagittal sinus, internal cerebral veins, vein of Kevin, straight sinus, transverse sinuses, sigmoid sinuses and internal jugular veins are normal. Cortical veins are normal.\par \par IMPRESSION: There  has been a caliber change of the distal left cervical internal carotid artery with increasing narrowing and dilatation 1.3 cm proximal to the patient's known pseudoaneurysm proximal to the skull base compared with 11/9/2022.\par \par Dr. Muniz discussed these findings with Dr. Lunsford on 5/10/2023 3:30 PM with read back.\par \par --- End of Report ---\par \par \par \par \par \par \par THIEN MUNIZ MD; Attending Radiologist\par This document has been electronically signed. May 10 2023  3:31PM\par \par  \par \par  Ordered by: YANNICK JOLLY       Collected/Examined: 10May2023 11:37AM       \par Verified by: YANNICK JOLLY 11May2023 07:35AM       \par  Result Communication: No patient communication needed at this time;\par Stage: Final       \par  Performed at: [facility]       Resulted: 10May2023 03:12PM       Last Updated: 11May2023 07:35AM       Accession: [id number]

## 2023-06-26 ENCOUNTER — NON-APPOINTMENT (OUTPATIENT)
Age: 78
End: 2023-06-26

## 2023-07-28 NOTE — HISTORY OF PRESENT ILLNESS
[FreeTextEntry1] : 77 year old man w/ PMH of Afib s/p ablation (previously on Xarelto but now on Eliquis), recent ischemic stroke (L. M1 occlusion s/p mechanical thrombectomy; mechanism Afib switched from Xarelto to Eliquis), HTN, HLD, presents for follow up. \par \par Patient denies any acute complaints. States his language has improved nearly back to normal and the strength on the right side is normal. Patient happy with his recovery. States he is on Eliquis and has not missed any doses. No GIB or systemic hemorrhage. \par \par Patient was admitted in July 2022 with L. M1 occlusion and underwent mechanical thrombectomy. 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 MRI brain post-op demonstrated subtle diffusion restriction in the left hemisphere and subtle SAH. His anticoagulation was changed from Xarelto to Eliquis upon discharge. TTE demonstrated a dilated LA. Of note, patient had cardiac ablation one year prior to stroke but remained on Xarelto and had an ischemic event despite being on Xarelto. 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 He is following with Dr. Tilley for this. He was recently diagnosed with COVID and his d-dimers were 600 range. He had a repeat CTA h/n to assess the dissection which was stable, demonstrating a small distal cerivcal ICA dissection on the left.

## 2023-07-28 NOTE — ASSESSMENT
[FreeTextEntry1] : 77 year old man w/ PMH of Afib s/p ablation (previously on Xarelto but now on Eliquis), recent ischemic stroke (L. M1 occlusion s/p mechanical thrombectomy; mechanism Afib switched from Xarelto to Eliquis), HTN, HLD, presents for follow up. Patient doing well without any acute complaints. \par \par Impression: L. MCA stroke secondary to Afib, now on Eliquis. Plan to continue Eliquis. We had discussion with patient regarding possibility of placing a cardiac monitor for several months while he remains on Eliquis. If cardiac monitoring does not demonstrate Afib, can potentially discuss taking patient off. Patient needs time to think about this. \par \par Plan: \par Continue Eliquis \par \par \par

## 2023-08-03 ENCOUNTER — APPOINTMENT (OUTPATIENT)
Dept: PHARMACY | Facility: CLINIC | Age: 78
End: 2023-08-03
Payer: SELF-PAY

## 2023-08-03 PROCEDURE — V5258H: CUSTOM

## 2023-08-22 ENCOUNTER — APPOINTMENT (OUTPATIENT)
Dept: PHARMACY | Facility: CLINIC | Age: 78
End: 2023-08-22
Payer: SELF-PAY

## 2023-08-22 PROCEDURE — V5299A: CUSTOM

## 2023-09-11 ENCOUNTER — APPOINTMENT (OUTPATIENT)
Dept: OTOLARYNGOLOGY | Facility: CLINIC | Age: 78
End: 2023-09-11
Payer: SELF-PAY

## 2023-09-11 PROCEDURE — V5299A: CUSTOM

## 2024-06-20 ENCOUNTER — NON-APPOINTMENT (OUTPATIENT)
Age: 79
End: 2024-06-20

## 2024-06-27 ENCOUNTER — APPOINTMENT (OUTPATIENT)
Dept: OTOLARYNGOLOGY | Facility: CLINIC | Age: 79
End: 2024-06-27
Payer: SELF-PAY

## 2024-06-27 PROCEDURE — V5010 ASSESSMENT FOR HEARING AID: CPT | Mod: NC

## 2024-07-10 ENCOUNTER — APPOINTMENT (OUTPATIENT)
Dept: OTOLARYNGOLOGY | Facility: CLINIC | Age: 79
End: 2024-07-10
Payer: SELF-PAY

## 2024-07-10 PROCEDURE — V5261F: CUSTOM

## 2024-07-24 ENCOUNTER — APPOINTMENT (OUTPATIENT)
Dept: PHARMACY | Facility: CLINIC | Age: 79
End: 2024-07-24

## 2024-07-30 ENCOUNTER — APPOINTMENT (OUTPATIENT)
Dept: PHARMACY | Facility: CLINIC | Age: 79
End: 2024-07-30
Payer: SELF-PAY

## 2024-07-30 PROCEDURE — V5299A: CUSTOM

## 2024-10-16 ENCOUNTER — APPOINTMENT (OUTPATIENT)
Dept: PHARMACY | Facility: CLINIC | Age: 79
End: 2024-10-16
Payer: SELF-PAY

## 2024-10-16 PROCEDURE — V5261F: CUSTOM

## 2024-10-30 ENCOUNTER — APPOINTMENT (OUTPATIENT)
Dept: PHARMACY | Facility: CLINIC | Age: 79
End: 2024-10-30

## 2024-12-12 NOTE — DIETITIAN INITIAL EVALUATION ADULT - NSICDXPASTMEDICALHX_GEN_ALL_CORE_FT
PAST MEDICAL HISTORY:  Afib     CVD (cardiovascular disease)     GERD (gastroesophageal reflux disease)     HLD (hyperlipidemia)     HTN (hypertension)      75

## 2025-01-13 ENCOUNTER — APPOINTMENT (OUTPATIENT)
Dept: OTOLARYNGOLOGY | Facility: CLINIC | Age: 80
End: 2025-01-13
Payer: MEDICARE

## 2025-01-13 ENCOUNTER — APPOINTMENT (OUTPATIENT)
Dept: PHARMACY | Facility: CLINIC | Age: 80
End: 2025-01-13
Payer: SELF-PAY

## 2025-01-13 PROCEDURE — V5299A: CUSTOM

## 2025-01-13 PROCEDURE — 92557 COMPREHENSIVE HEARING TEST: CPT

## 2025-01-13 PROCEDURE — 92550 TYMPANOMETRY & REFLEX THRESH: CPT | Mod: 52

## 2025-03-14 ENCOUNTER — APPOINTMENT (OUTPATIENT)
Dept: OTOLARYNGOLOGY | Facility: CLINIC | Age: 80
End: 2025-03-14
Payer: MEDICARE

## 2025-03-14 ENCOUNTER — NON-APPOINTMENT (OUTPATIENT)
Age: 80
End: 2025-03-14

## 2025-03-14 DIAGNOSIS — K21.9 GASTRO-ESOPHAGEAL REFLUX DISEASE W/OUT ESOPHAGITIS: ICD-10-CM

## 2025-03-14 DIAGNOSIS — J38.3 OTHER DISEASES OF VOCAL CORDS: ICD-10-CM

## 2025-03-14 PROCEDURE — 31575 DIAGNOSTIC LARYNGOSCOPY: CPT

## 2025-03-14 PROCEDURE — 99213 OFFICE O/P EST LOW 20 MIN: CPT | Mod: 25

## 2025-04-25 ENCOUNTER — APPOINTMENT (OUTPATIENT)
Dept: PHARMACY | Facility: CLINIC | Age: 80
End: 2025-04-25
Payer: SELF-PAY

## 2025-04-25 PROCEDURE — V5299A: CUSTOM

## 2025-05-23 ENCOUNTER — APPOINTMENT (OUTPATIENT)
Dept: OTOLARYNGOLOGY | Facility: CLINIC | Age: 80
End: 2025-05-23
Payer: MEDICARE

## 2025-05-23 PROCEDURE — 92520 LARYNGEAL FUNCTION STUDIES: CPT | Mod: 52,59,GN

## 2025-05-23 PROCEDURE — 92524 BEHAVRAL QUALIT ANALYS VOICE: CPT | Mod: GN

## 2025-05-23 PROCEDURE — 31579 LARYNGOSCOPY TELESCOPIC: CPT

## 2025-06-26 ENCOUNTER — APPOINTMENT (OUTPATIENT)
Dept: OTOLARYNGOLOGY | Facility: CLINIC | Age: 80
End: 2025-06-26
Payer: MEDICARE

## 2025-06-26 PROCEDURE — 92507 TX SP LANG VOICE COMM INDIV: CPT | Mod: GN

## 2025-07-08 ENCOUNTER — APPOINTMENT (OUTPATIENT)
Dept: PHARMACY | Facility: CLINIC | Age: 80
End: 2025-07-08
Payer: SELF-PAY

## 2025-07-08 PROCEDURE — 92592: CPT | Mod: NC

## 2025-07-11 ENCOUNTER — APPOINTMENT (OUTPATIENT)
Dept: OTOLARYNGOLOGY | Facility: CLINIC | Age: 80
End: 2025-07-11

## 2025-07-15 ENCOUNTER — APPOINTMENT (OUTPATIENT)
Dept: PHARMACY | Facility: CLINIC | Age: 80
End: 2025-07-15
Payer: SELF-PAY

## 2025-07-15 PROCEDURE — V5299K: CUSTOM

## 2025-07-18 ENCOUNTER — APPOINTMENT (OUTPATIENT)
Dept: OTOLARYNGOLOGY | Facility: CLINIC | Age: 80
End: 2025-07-18
Payer: MEDICARE

## 2025-07-18 PROCEDURE — 92507 TX SP LANG VOICE COMM INDIV: CPT | Mod: GN

## 2025-07-25 ENCOUNTER — APPOINTMENT (OUTPATIENT)
Dept: OTOLARYNGOLOGY | Facility: CLINIC | Age: 80
End: 2025-07-25

## 2025-08-01 ENCOUNTER — APPOINTMENT (OUTPATIENT)
Dept: OTOLARYNGOLOGY | Facility: CLINIC | Age: 80
End: 2025-08-01
Payer: MEDICARE

## 2025-08-01 PROCEDURE — 92507 TX SP LANG VOICE COMM INDIV: CPT | Mod: GN

## 2025-08-08 ENCOUNTER — APPOINTMENT (OUTPATIENT)
Dept: OTOLARYNGOLOGY | Facility: CLINIC | Age: 80
End: 2025-08-08

## 2025-08-20 ENCOUNTER — APPOINTMENT (OUTPATIENT)
Dept: PHARMACY | Facility: CLINIC | Age: 80
End: 2025-08-20
Payer: SELF-PAY

## 2025-08-20 PROCEDURE — V5299A: CUSTOM | Mod: NC
